# Patient Record
Sex: FEMALE | Race: WHITE | NOT HISPANIC OR LATINO | Employment: OTHER | URBAN - METROPOLITAN AREA
[De-identification: names, ages, dates, MRNs, and addresses within clinical notes are randomized per-mention and may not be internally consistent; named-entity substitution may affect disease eponyms.]

---

## 2018-05-07 ENCOUNTER — CONVERSION ENCOUNTER (OUTPATIENT)
Dept: FAMILY MEDICINE CLINIC | Facility: CLINIC | Age: 83
End: 2018-05-07

## 2018-05-07 ENCOUNTER — OFFICE VISIT CONVERTED (OUTPATIENT)
Dept: FAMILY MEDICINE CLINIC | Facility: CLINIC | Age: 83
End: 2018-05-07
Attending: NURSE PRACTITIONER

## 2018-07-30 ENCOUNTER — OFFICE VISIT CONVERTED (OUTPATIENT)
Dept: FAMILY MEDICINE CLINIC | Facility: CLINIC | Age: 83
End: 2018-07-30
Attending: NURSE PRACTITIONER

## 2018-11-05 ENCOUNTER — OFFICE VISIT CONVERTED (OUTPATIENT)
Dept: FAMILY MEDICINE CLINIC | Facility: CLINIC | Age: 83
End: 2018-11-05
Attending: NURSE PRACTITIONER

## 2019-01-29 ENCOUNTER — HOSPITAL ENCOUNTER (OUTPATIENT)
Dept: FAMILY MEDICINE CLINIC | Facility: CLINIC | Age: 84
Discharge: HOME | End: 2019-01-29
Attending: NURSE PRACTITIONER

## 2019-01-29 LAB
ALBUMIN SERPL-MCNC: 4.2 G/DL (ref 3.5–5)
ALBUMIN/GLOB SERPL: 1.4 {RATIO} (ref 1.4–2.6)
ALP SERPL-CCNC: 64 U/L (ref 43–160)
ALT SERPL-CCNC: 12 U/L (ref 10–40)
ANION GAP SERPL CALC-SCNC: 16 MMOL/L (ref 8–19)
AST SERPL-CCNC: 17 U/L (ref 15–50)
BASOPHILS # BLD AUTO: 0.07 10*3/UL (ref 0–0.2)
BASOPHILS NFR BLD AUTO: 1.21 % (ref 0–3)
BILIRUB SERPL-MCNC: 0.59 MG/DL (ref 0.2–1.3)
BUN SERPL-MCNC: 20 MG/DL (ref 5–25)
BUN/CREAT SERPL: 29 {RATIO} (ref 6–20)
CALCIUM SERPL-MCNC: 9.3 MG/DL (ref 8.7–10.4)
CHLORIDE SERPL-SCNC: 102 MMOL/L (ref 99–111)
CHOLEST SERPL-MCNC: 202 MG/DL (ref 107–200)
CHOLEST/HDLC SERPL: 4 {RATIO} (ref 3–6)
CONV CO2: 29 MMOL/L (ref 22–32)
CONV TOTAL PROTEIN: 7.3 G/DL (ref 6.3–8.2)
CREAT UR-MCNC: 0.69 MG/DL (ref 0.5–0.9)
EOSINOPHIL # BLD AUTO: 0.22 10*3/UL (ref 0–0.7)
EOSINOPHIL # BLD AUTO: 3.73 % (ref 0–7)
ERYTHROCYTE [DISTWIDTH] IN BLOOD BY AUTOMATED COUNT: 12.1 % (ref 11.5–14.5)
EST. AVERAGE GLUCOSE BLD GHB EST-MCNC: 120 MG/DL
GFR SERPLBLD BASED ON 1.73 SQ M-ARVRAT: >60 ML/MIN/{1.73_M2}
GLOBULIN UR ELPH-MCNC: 3.1 G/DL (ref 2–3.5)
GLUCOSE SERPL-MCNC: 100 MG/DL (ref 65–99)
HBA1C MFR BLD: 13.9 G/DL (ref 12–16)
HBA1C MFR BLD: 5.8 % (ref 3.5–5.7)
HCT VFR BLD AUTO: 42.6 % (ref 37–47)
HDLC SERPL-MCNC: 50 MG/DL (ref 40–60)
LDLC SERPL CALC-MCNC: 129 MG/DL (ref 70–100)
LYMPHOCYTES # BLD AUTO: 1.64 10*3/UL (ref 1–5)
MCH RBC QN AUTO: 31.9 PG (ref 27–31)
MCHC RBC AUTO-ENTMCNC: 32.6 G/DL (ref 33–37)
MCV RBC AUTO: 98 FL (ref 81–99)
MONOCYTES # BLD AUTO: 0.48 10*3/UL (ref 0.2–1.2)
MONOCYTES NFR BLD AUTO: 8.23 % (ref 3–10)
NEUTROPHILS # BLD AUTO: 3.37 10*3/UL (ref 2–8)
NEUTROPHILS NFR BLD AUTO: 58.4 % (ref 30–85)
NRBC BLD AUTO-RTO: 0 % (ref 0–0.01)
OSMOLALITY SERPL CALC.SUM OF ELEC: 297 MOSM/KG (ref 273–304)
PLATELET # BLD AUTO: 179 10*3/UL (ref 130–400)
PMV BLD AUTO: 9.1 FL (ref 7.4–10.4)
POTASSIUM SERPL-SCNC: 5.1 MMOL/L (ref 3.5–5.3)
RBC # BLD AUTO: 4.35 10*6/UL (ref 4.2–5.4)
SODIUM SERPL-SCNC: 142 MMOL/L (ref 135–147)
T4 FREE SERPL-MCNC: 1.3 NG/DL (ref 0.9–1.8)
TRIGL SERPL-MCNC: 114 MG/DL (ref 40–150)
TSH SERPL-ACNC: 2.86 M[IU]/L (ref 0.27–4.2)
VARIANT LYMPHS NFR BLD MANUAL: 28.4 % (ref 20–45)
VLDLC SERPL-MCNC: 23 MG/DL (ref 5–37)
WBC # BLD AUTO: 5.77 10*3/UL (ref 4.8–10.8)

## 2019-02-04 ENCOUNTER — OFFICE VISIT CONVERTED (OUTPATIENT)
Dept: FAMILY MEDICINE CLINIC | Facility: CLINIC | Age: 84
End: 2019-02-04
Attending: NURSE PRACTITIONER

## 2019-02-04 ENCOUNTER — CONVERSION ENCOUNTER (OUTPATIENT)
Dept: FAMILY MEDICINE CLINIC | Facility: CLINIC | Age: 84
End: 2019-02-04

## 2019-04-24 ENCOUNTER — HOSPITAL ENCOUNTER (OUTPATIENT)
Dept: FAMILY MEDICINE CLINIC | Facility: CLINIC | Age: 84
Discharge: HOME | End: 2019-04-24
Attending: NURSE PRACTITIONER

## 2019-04-24 LAB
ALBUMIN SERPL-MCNC: 4.3 G/DL (ref 3.5–5)
ALBUMIN/GLOB SERPL: 1.7 {RATIO} (ref 1.4–2.6)
ALP SERPL-CCNC: 66 U/L (ref 43–160)
ALT SERPL-CCNC: 13 U/L (ref 10–40)
ANION GAP SERPL CALC-SCNC: 17 MMOL/L (ref 8–19)
AST SERPL-CCNC: 18 U/L (ref 15–50)
BASOPHILS # BLD AUTO: 0.03 10*3/UL (ref 0–0.2)
BASOPHILS NFR BLD AUTO: 0.5 % (ref 0–3)
BILIRUB SERPL-MCNC: 0.29 MG/DL (ref 0.2–1.3)
BUN SERPL-MCNC: 21 MG/DL (ref 5–25)
BUN/CREAT SERPL: 32 {RATIO} (ref 6–20)
CALCIUM SERPL-MCNC: 9.9 MG/DL (ref 8.7–10.4)
CHLORIDE SERPL-SCNC: 103 MMOL/L (ref 99–111)
CHOLEST SERPL-MCNC: 165 MG/DL (ref 107–200)
CHOLEST/HDLC SERPL: 3.5 {RATIO} (ref 3–6)
CONV ABS IMM GRAN: 0.02 10*3/UL (ref 0–0.2)
CONV CO2: 30 MMOL/L (ref 22–32)
CONV IMMATURE GRAN: 0.3 % (ref 0–1.8)
CONV TOTAL PROTEIN: 6.9 G/DL (ref 6.3–8.2)
CREAT UR-MCNC: 0.65 MG/DL (ref 0.5–0.9)
DEPRECATED RDW RBC AUTO: 51.5 FL (ref 36.4–46.3)
EOSINOPHIL # BLD AUTO: 0.15 10*3/UL (ref 0–0.7)
EOSINOPHIL # BLD AUTO: 2.6 % (ref 0–7)
ERYTHROCYTE [DISTWIDTH] IN BLOOD BY AUTOMATED COUNT: 14.3 % (ref 11.7–14.4)
EST. AVERAGE GLUCOSE BLD GHB EST-MCNC: 103 MG/DL
GFR SERPLBLD BASED ON 1.73 SQ M-ARVRAT: >60 ML/MIN/{1.73_M2}
GLOBULIN UR ELPH-MCNC: 2.6 G/DL (ref 2–3.5)
GLUCOSE SERPL-MCNC: 87 MG/DL (ref 65–99)
HBA1C MFR BLD: 10.9 G/DL (ref 12–16)
HBA1C MFR BLD: 5.2 % (ref 3.5–5.7)
HCT VFR BLD AUTO: 37.8 % (ref 37–47)
HDLC SERPL-MCNC: 47 MG/DL (ref 40–60)
LDLC SERPL CALC-MCNC: 100 MG/DL (ref 70–100)
LYMPHOCYTES # BLD AUTO: 1.57 10*3/UL (ref 1–5)
MCH RBC QN AUTO: 28.1 PG (ref 27–31)
MCHC RBC AUTO-ENTMCNC: 28.8 G/DL (ref 33–37)
MCV RBC AUTO: 97.4 FL (ref 81–99)
MONOCYTES # BLD AUTO: 0.49 10*3/UL (ref 0.2–1.2)
MONOCYTES NFR BLD AUTO: 8.5 % (ref 3–10)
NEUTROPHILS # BLD AUTO: 3.52 10*3/UL (ref 2–8)
NEUTROPHILS NFR BLD AUTO: 60.9 % (ref 30–85)
NRBC CBCN: 0 % (ref 0–0.7)
OSMOLALITY SERPL CALC.SUM OF ELEC: 304 MOSM/KG (ref 273–304)
PLATELET # BLD AUTO: 229 10*3/UL (ref 130–400)
PMV BLD AUTO: 11.4 FL (ref 9.4–12.3)
POTASSIUM SERPL-SCNC: 3.7 MMOL/L (ref 3.5–5.3)
RBC # BLD AUTO: 3.88 10*6/UL (ref 4.2–5.4)
SODIUM SERPL-SCNC: 146 MMOL/L (ref 135–147)
T4 FREE SERPL-MCNC: 1.3 NG/DL (ref 0.9–1.8)
TRIGL SERPL-MCNC: 92 MG/DL (ref 40–150)
TSH SERPL-ACNC: 2.82 M[IU]/L (ref 0.27–4.2)
VARIANT LYMPHS NFR BLD MANUAL: 27.2 % (ref 20–45)
VLDLC SERPL-MCNC: 18 MG/DL (ref 5–37)
WBC # BLD AUTO: 5.78 10*3/UL (ref 4.8–10.8)

## 2019-05-02 ENCOUNTER — OFFICE VISIT CONVERTED (OUTPATIENT)
Dept: FAMILY MEDICINE CLINIC | Facility: CLINIC | Age: 84
End: 2019-05-02
Attending: NURSE PRACTITIONER

## 2019-05-02 ENCOUNTER — HOSPITAL ENCOUNTER (OUTPATIENT)
Dept: FAMILY MEDICINE CLINIC | Facility: CLINIC | Age: 84
Discharge: HOME | End: 2019-05-02
Attending: NURSE PRACTITIONER

## 2019-05-02 LAB
BASOPHILS # BLD AUTO: 0.03 10*3/UL (ref 0–0.2)
BASOPHILS NFR BLD AUTO: 0.5 % (ref 0–3)
BNP SERPL-MCNC: 584 PG/ML (ref 0–1800)
CONV ABS IMM GRAN: 0.01 10*3/UL (ref 0–0.2)
CONV IMMATURE GRAN: 0.2 % (ref 0–1.8)
DEPRECATED RDW RBC AUTO: 50.9 FL (ref 36.4–46.3)
EOSINOPHIL # BLD AUTO: 0.19 10*3/UL (ref 0–0.7)
EOSINOPHIL # BLD AUTO: 3.1 % (ref 0–7)
ERYTHROCYTE [DISTWIDTH] IN BLOOD BY AUTOMATED COUNT: 14.5 % (ref 11.7–14.4)
HBA1C MFR BLD: 10.9 G/DL (ref 12–16)
HCT VFR BLD AUTO: 37.5 % (ref 37–47)
IRON SATN MFR SERPL: 8 % (ref 20–55)
IRON SERPL-MCNC: 39 UG/DL (ref 60–170)
LYMPHOCYTES # BLD AUTO: 1.63 10*3/UL (ref 1–5)
MCH RBC QN AUTO: 27.8 PG (ref 27–31)
MCHC RBC AUTO-ENTMCNC: 29.1 G/DL (ref 33–37)
MCV RBC AUTO: 95.7 FL (ref 81–99)
MONOCYTES # BLD AUTO: 0.51 10*3/UL (ref 0.2–1.2)
MONOCYTES NFR BLD AUTO: 8.4 % (ref 3–10)
NEUTROPHILS # BLD AUTO: 3.73 10*3/UL (ref 2–8)
NEUTROPHILS NFR BLD AUTO: 61.1 % (ref 30–85)
NRBC CBCN: 0 % (ref 0–0.7)
PLATELET # BLD AUTO: 248 10*3/UL (ref 130–400)
PMV BLD AUTO: 11.7 FL (ref 9.4–12.3)
RBC # BLD AUTO: 3.92 10*6/UL (ref 4.2–5.4)
TIBC SERPL-MCNC: 465 UG/DL (ref 245–450)
TRANSFERRIN SERPL-MCNC: 325 MG/DL (ref 250–380)
VARIANT LYMPHS NFR BLD MANUAL: 26.7 % (ref 20–45)
WBC # BLD AUTO: 6.1 10*3/UL (ref 4.8–10.8)

## 2019-08-19 ENCOUNTER — HOSPITAL ENCOUNTER (OUTPATIENT)
Dept: FAMILY MEDICINE CLINIC | Facility: CLINIC | Age: 84
Discharge: HOME | End: 2019-08-19
Attending: NURSE PRACTITIONER

## 2019-08-19 LAB
ALBUMIN SERPL-MCNC: 4.5 G/DL (ref 3.5–5)
ALBUMIN/GLOB SERPL: 1.7 {RATIO} (ref 1.4–2.6)
ALP SERPL-CCNC: 59 U/L (ref 43–160)
ALT SERPL-CCNC: 11 U/L (ref 10–40)
ANION GAP SERPL CALC-SCNC: 20 MMOL/L (ref 8–19)
AST SERPL-CCNC: 20 U/L (ref 15–50)
BASOPHILS # BLD AUTO: 0.03 10*3/UL (ref 0–0.2)
BASOPHILS NFR BLD AUTO: 0.7 % (ref 0–3)
BILIRUB SERPL-MCNC: 0.39 MG/DL (ref 0.2–1.3)
BUN SERPL-MCNC: 14 MG/DL (ref 5–25)
BUN/CREAT SERPL: 22 {RATIO} (ref 6–20)
CALCIUM SERPL-MCNC: 9.1 MG/DL (ref 8.7–10.4)
CHLORIDE SERPL-SCNC: 104 MMOL/L (ref 99–111)
CHOLEST SERPL-MCNC: 168 MG/DL (ref 107–200)
CHOLEST/HDLC SERPL: 3.6 {RATIO} (ref 3–6)
CONV ABS IMM GRAN: 0.01 10*3/UL (ref 0–0.2)
CONV CO2: 24 MMOL/L (ref 22–32)
CONV IMMATURE GRAN: 0.2 % (ref 0–1.8)
CONV TOTAL PROTEIN: 7.1 G/DL (ref 6.3–8.2)
CREAT UR-MCNC: 0.64 MG/DL (ref 0.5–0.9)
DEPRECATED RDW RBC AUTO: 64.4 FL (ref 36.4–46.3)
EOSINOPHIL # BLD AUTO: 0.15 10*3/UL (ref 0–0.7)
EOSINOPHIL # BLD AUTO: 3.4 % (ref 0–7)
ERYTHROCYTE [DISTWIDTH] IN BLOOD BY AUTOMATED COUNT: 19.2 % (ref 11.7–14.4)
GFR SERPLBLD BASED ON 1.73 SQ M-ARVRAT: >60 ML/MIN/{1.73_M2}
GLOBULIN UR ELPH-MCNC: 2.6 G/DL (ref 2–3.5)
GLUCOSE SERPL-MCNC: 107 MG/DL (ref 65–99)
HCT VFR BLD AUTO: 38.5 % (ref 37–47)
HDLC SERPL-MCNC: 47 MG/DL (ref 40–60)
HGB BLD-MCNC: 11.3 G/DL (ref 12–16)
LDLC SERPL CALC-MCNC: 99 MG/DL (ref 70–100)
LYMPHOCYTES # BLD AUTO: 1.27 10*3/UL (ref 1–5)
LYMPHOCYTES NFR BLD AUTO: 28.5 % (ref 20–45)
MCH RBC QN AUTO: 26.7 PG (ref 27–31)
MCHC RBC AUTO-ENTMCNC: 29.4 G/DL (ref 33–37)
MCV RBC AUTO: 90.8 FL (ref 81–99)
MONOCYTES # BLD AUTO: 0.41 10*3/UL (ref 0.2–1.2)
MONOCYTES NFR BLD AUTO: 9.2 % (ref 3–10)
NEUTROPHILS # BLD AUTO: 2.58 10*3/UL (ref 2–8)
NEUTROPHILS NFR BLD AUTO: 58 % (ref 30–85)
NRBC CBCN: 0 % (ref 0–0.7)
OSMOLALITY SERPL CALC.SUM OF ELEC: 299 MOSM/KG (ref 273–304)
PLATELET # BLD AUTO: 197 10*3/UL (ref 130–400)
PMV BLD AUTO: 11.5 FL (ref 9.4–12.3)
POTASSIUM SERPL-SCNC: 4.1 MMOL/L (ref 3.5–5.3)
RBC # BLD AUTO: 4.24 10*6/UL (ref 4.2–5.4)
SODIUM SERPL-SCNC: 144 MMOL/L (ref 135–147)
T4 FREE SERPL-MCNC: 1.3 NG/DL (ref 0.9–1.8)
TRIGL SERPL-MCNC: 111 MG/DL (ref 40–150)
TSH SERPL-ACNC: 2.19 M[IU]/L (ref 0.27–4.2)
VLDLC SERPL-MCNC: 22 MG/DL (ref 5–37)
WBC # BLD AUTO: 4.45 10*3/UL (ref 4.8–10.8)

## 2019-08-21 ENCOUNTER — OFFICE VISIT CONVERTED (OUTPATIENT)
Dept: FAMILY MEDICINE CLINIC | Facility: CLINIC | Age: 84
End: 2019-08-21
Attending: NURSE PRACTITIONER

## 2019-08-21 ENCOUNTER — CONVERSION ENCOUNTER (OUTPATIENT)
Dept: FAMILY MEDICINE CLINIC | Facility: CLINIC | Age: 84
End: 2019-08-21

## 2019-11-12 ENCOUNTER — HOSPITAL ENCOUNTER (OUTPATIENT)
Dept: FAMILY MEDICINE CLINIC | Facility: CLINIC | Age: 84
Discharge: HOME | End: 2019-11-12
Attending: NURSE PRACTITIONER

## 2019-11-12 LAB
ALBUMIN SERPL-MCNC: 4.2 G/DL (ref 3.5–5)
ALBUMIN/GLOB SERPL: 1.4 {RATIO} (ref 1.4–2.6)
ALP SERPL-CCNC: 74 U/L (ref 43–160)
ALT SERPL-CCNC: 16 U/L (ref 10–40)
ANION GAP SERPL CALC-SCNC: 15 MMOL/L (ref 8–19)
AST SERPL-CCNC: 21 U/L (ref 15–50)
BASOPHILS # BLD AUTO: 0.03 10*3/UL (ref 0–0.2)
BASOPHILS NFR BLD AUTO: 0.6 % (ref 0–3)
BILIRUB SERPL-MCNC: 0.43 MG/DL (ref 0.2–1.3)
BUN SERPL-MCNC: 12 MG/DL (ref 5–25)
BUN/CREAT SERPL: 18 {RATIO} (ref 6–20)
CALCIUM SERPL-MCNC: 9.4 MG/DL (ref 8.7–10.4)
CHLORIDE SERPL-SCNC: 102 MMOL/L (ref 99–111)
CHOLEST SERPL-MCNC: 160 MG/DL (ref 107–200)
CHOLEST/HDLC SERPL: 3 {RATIO} (ref 3–6)
CONV ABS IMM GRAN: 0.02 10*3/UL (ref 0–0.2)
CONV CO2: 29 MMOL/L (ref 22–32)
CONV IMMATURE GRAN: 0.4 % (ref 0–1.8)
CONV TOTAL PROTEIN: 7.1 G/DL (ref 6.3–8.2)
CREAT UR-MCNC: 0.65 MG/DL (ref 0.5–0.9)
DEPRECATED RDW RBC AUTO: 53.4 FL (ref 36.4–46.3)
EOSINOPHIL # BLD AUTO: 0.15 10*3/UL (ref 0–0.7)
EOSINOPHIL # BLD AUTO: 2.9 % (ref 0–7)
ERYTHROCYTE [DISTWIDTH] IN BLOOD BY AUTOMATED COUNT: 14.9 % (ref 11.7–14.4)
GFR SERPLBLD BASED ON 1.73 SQ M-ARVRAT: >60 ML/MIN/{1.73_M2}
GLOBULIN UR ELPH-MCNC: 2.9 G/DL (ref 2–3.5)
GLUCOSE SERPL-MCNC: 139 MG/DL (ref 65–99)
HCT VFR BLD AUTO: 39.1 % (ref 37–47)
HDLC SERPL-MCNC: 53 MG/DL (ref 40–60)
HGB BLD-MCNC: 11.6 G/DL (ref 12–16)
LDLC SERPL CALC-MCNC: 93 MG/DL (ref 70–100)
LYMPHOCYTES # BLD AUTO: 1.39 10*3/UL (ref 1–5)
LYMPHOCYTES NFR BLD AUTO: 26.6 % (ref 20–45)
MCH RBC QN AUTO: 28.8 PG (ref 27–31)
MCHC RBC AUTO-ENTMCNC: 29.7 G/DL (ref 33–37)
MCV RBC AUTO: 97 FL (ref 81–99)
MONOCYTES # BLD AUTO: 0.38 10*3/UL (ref 0.2–1.2)
MONOCYTES NFR BLD AUTO: 7.3 % (ref 3–10)
NEUTROPHILS # BLD AUTO: 3.25 10*3/UL (ref 2–8)
NEUTROPHILS NFR BLD AUTO: 62.2 % (ref 30–85)
NRBC CBCN: 0 % (ref 0–0.7)
OSMOLALITY SERPL CALC.SUM OF ELEC: 296 MOSM/KG (ref 273–304)
PLATELET # BLD AUTO: 193 10*3/UL (ref 130–400)
PMV BLD AUTO: 11.4 FL (ref 9.4–12.3)
POTASSIUM SERPL-SCNC: 4.3 MMOL/L (ref 3.5–5.3)
RBC # BLD AUTO: 4.03 10*6/UL (ref 4.2–5.4)
SODIUM SERPL-SCNC: 142 MMOL/L (ref 135–147)
T4 FREE SERPL-MCNC: 1.2 NG/DL (ref 0.9–1.8)
TRIGL SERPL-MCNC: 72 MG/DL (ref 40–150)
TSH SERPL-ACNC: 3.02 M[IU]/L (ref 0.27–4.2)
VLDLC SERPL-MCNC: 14 MG/DL (ref 5–37)
WBC # BLD AUTO: 5.22 10*3/UL (ref 4.8–10.8)

## 2019-11-14 ENCOUNTER — OFFICE VISIT CONVERTED (OUTPATIENT)
Dept: FAMILY MEDICINE CLINIC | Facility: CLINIC | Age: 84
End: 2019-11-14
Attending: NURSE PRACTITIONER

## 2019-11-14 ENCOUNTER — CONVERSION ENCOUNTER (OUTPATIENT)
Dept: FAMILY MEDICINE CLINIC | Facility: CLINIC | Age: 84
End: 2019-11-14

## 2019-11-21 ENCOUNTER — OFFICE VISIT CONVERTED (OUTPATIENT)
Dept: FAMILY MEDICINE CLINIC | Facility: CLINIC | Age: 84
End: 2019-11-21
Attending: NURSE PRACTITIONER

## 2019-11-21 ENCOUNTER — CONVERSION ENCOUNTER (OUTPATIENT)
Dept: FAMILY MEDICINE CLINIC | Facility: CLINIC | Age: 84
End: 2019-11-21

## 2020-02-04 ENCOUNTER — HOSPITAL ENCOUNTER (OUTPATIENT)
Dept: FAMILY MEDICINE CLINIC | Facility: CLINIC | Age: 85
Discharge: HOME | End: 2020-02-04
Attending: NURSE PRACTITIONER

## 2020-02-04 LAB
ALBUMIN SERPL-MCNC: 4.3 G/DL (ref 3.5–5)
ALBUMIN/GLOB SERPL: 1.5 {RATIO} (ref 1.4–2.6)
ALP SERPL-CCNC: 69 U/L (ref 43–160)
ALT SERPL-CCNC: 14 U/L (ref 10–40)
ANION GAP SERPL CALC-SCNC: 20 MMOL/L (ref 8–19)
AST SERPL-CCNC: 20 U/L (ref 15–50)
BASOPHILS # BLD AUTO: 0.03 10*3/UL (ref 0–0.2)
BASOPHILS NFR BLD AUTO: 0.5 % (ref 0–3)
BILIRUB SERPL-MCNC: 0.4 MG/DL (ref 0.2–1.3)
BUN SERPL-MCNC: 14 MG/DL (ref 5–25)
BUN/CREAT SERPL: 19 {RATIO} (ref 6–20)
CALCIUM SERPL-MCNC: 9.9 MG/DL (ref 8.7–10.4)
CHLORIDE SERPL-SCNC: 103 MMOL/L (ref 99–111)
CHOLEST SERPL-MCNC: 174 MG/DL (ref 107–200)
CHOLEST/HDLC SERPL: 3.4 {RATIO} (ref 3–6)
CONV ABS IMM GRAN: 0.01 10*3/UL (ref 0–0.2)
CONV CO2: 28 MMOL/L (ref 22–32)
CONV IMMATURE GRAN: 0.2 % (ref 0–1.8)
CONV TOTAL PROTEIN: 7.2 G/DL (ref 6.3–8.2)
CREAT UR-MCNC: 0.72 MG/DL (ref 0.5–0.9)
DEPRECATED RDW RBC AUTO: 58.9 FL (ref 36.4–46.3)
EOSINOPHIL # BLD AUTO: 0.19 10*3/UL (ref 0–0.7)
EOSINOPHIL # BLD AUTO: 3.3 % (ref 0–7)
ERYTHROCYTE [DISTWIDTH] IN BLOOD BY AUTOMATED COUNT: 16.1 % (ref 11.7–14.4)
EST. AVERAGE GLUCOSE BLD GHB EST-MCNC: 111 MG/DL
GFR SERPLBLD BASED ON 1.73 SQ M-ARVRAT: >60 ML/MIN/{1.73_M2}
GLOBULIN UR ELPH-MCNC: 2.9 G/DL (ref 2–3.5)
GLUCOSE SERPL-MCNC: 96 MG/DL (ref 65–99)
HBA1C MFR BLD: 5.5 % (ref 3.5–5.7)
HCT VFR BLD AUTO: 42.3 % (ref 37–47)
HDLC SERPL-MCNC: 51 MG/DL (ref 40–60)
HGB BLD-MCNC: 12.7 G/DL (ref 12–16)
IRON SATN MFR SERPL: 19 % (ref 20–55)
IRON SERPL-MCNC: 76 UG/DL (ref 60–170)
LDLC SERPL CALC-MCNC: 111 MG/DL (ref 70–100)
LYMPHOCYTES # BLD AUTO: 1.62 10*3/UL (ref 1–5)
LYMPHOCYTES NFR BLD AUTO: 28.4 % (ref 20–45)
MCH RBC QN AUTO: 29.8 PG (ref 27–31)
MCHC RBC AUTO-ENTMCNC: 30 G/DL (ref 33–37)
MCV RBC AUTO: 99.3 FL (ref 81–99)
MONOCYTES # BLD AUTO: 0.52 10*3/UL (ref 0.2–1.2)
MONOCYTES NFR BLD AUTO: 9.1 % (ref 3–10)
NEUTROPHILS # BLD AUTO: 3.33 10*3/UL (ref 2–8)
NEUTROPHILS NFR BLD AUTO: 58.5 % (ref 30–85)
NRBC CBCN: 0 % (ref 0–0.7)
OSMOLALITY SERPL CALC.SUM OF ELEC: 304 MOSM/KG (ref 273–304)
PLATELET # BLD AUTO: 185 10*3/UL (ref 130–400)
PMV BLD AUTO: 11.8 FL (ref 9.4–12.3)
POTASSIUM SERPL-SCNC: 4 MMOL/L (ref 3.5–5.3)
RBC # BLD AUTO: 4.26 10*6/UL (ref 4.2–5.4)
SODIUM SERPL-SCNC: 147 MMOL/L (ref 135–147)
T4 FREE SERPL-MCNC: 1.1 NG/DL (ref 0.9–1.8)
TIBC SERPL-MCNC: 410 UG/DL (ref 245–450)
TRANSFERRIN SERPL-MCNC: 287 MG/DL (ref 250–380)
TRIGL SERPL-MCNC: 61 MG/DL (ref 40–150)
TSH SERPL-ACNC: 4.17 M[IU]/L (ref 0.27–4.2)
VLDLC SERPL-MCNC: 12 MG/DL (ref 5–37)
WBC # BLD AUTO: 5.7 10*3/UL (ref 4.8–10.8)

## 2020-02-05 ENCOUNTER — OFFICE VISIT CONVERTED (OUTPATIENT)
Dept: FAMILY MEDICINE CLINIC | Facility: CLINIC | Age: 85
End: 2020-02-05
Attending: NURSE PRACTITIONER

## 2020-04-30 ENCOUNTER — TELEPHONE CONVERTED (OUTPATIENT)
Dept: FAMILY MEDICINE CLINIC | Facility: CLINIC | Age: 85
End: 2020-04-30
Attending: NURSE PRACTITIONER

## 2020-07-27 ENCOUNTER — HOSPITAL ENCOUNTER (OUTPATIENT)
Dept: FAMILY MEDICINE CLINIC | Facility: CLINIC | Age: 85
Discharge: HOME | End: 2020-07-27
Attending: NURSE PRACTITIONER

## 2020-07-27 LAB
ALBUMIN SERPL-MCNC: 4 G/DL (ref 3.5–5)
ALBUMIN/GLOB SERPL: 1.3 {RATIO} (ref 1.4–2.6)
ALP SERPL-CCNC: 70 U/L (ref 38–185)
ALT SERPL-CCNC: 19 U/L (ref 10–40)
ANION GAP SERPL CALC-SCNC: 21 MMOL/L (ref 8–19)
AST SERPL-CCNC: 24 U/L (ref 15–50)
BASOPHILS # BLD AUTO: 0.02 10*3/UL (ref 0–0.2)
BASOPHILS NFR BLD AUTO: 0.4 % (ref 0–3)
BILIRUB SERPL-MCNC: 0.25 MG/DL (ref 0.2–1.3)
BUN SERPL-MCNC: 16 MG/DL (ref 5–25)
BUN/CREAT SERPL: 21 {RATIO} (ref 6–20)
CALCIUM SERPL-MCNC: 10 MG/DL (ref 8.7–10.4)
CHLORIDE SERPL-SCNC: 108 MMOL/L (ref 99–111)
CHOLEST SERPL-MCNC: 155 MG/DL (ref 107–200)
CHOLEST/HDLC SERPL: 4 {RATIO} (ref 3–6)
CONV ABS IMM GRAN: 0.01 10*3/UL (ref 0–0.2)
CONV CO2: 23 MMOL/L (ref 22–32)
CONV IMMATURE GRAN: 0.2 % (ref 0–1.8)
CONV TOTAL PROTEIN: 7.1 G/DL (ref 6.3–8.2)
CREAT UR-MCNC: 0.76 MG/DL (ref 0.5–0.9)
DEPRECATED RDW RBC AUTO: 50.4 FL (ref 36.4–46.3)
EOSINOPHIL # BLD AUTO: 0.21 10*3/UL (ref 0–0.7)
EOSINOPHIL # BLD AUTO: 4.6 % (ref 0–7)
ERYTHROCYTE [DISTWIDTH] IN BLOOD BY AUTOMATED COUNT: 13.2 % (ref 11.7–14.4)
GFR SERPLBLD BASED ON 1.73 SQ M-ARVRAT: >60 ML/MIN/{1.73_M2}
GLOBULIN UR ELPH-MCNC: 3.1 G/DL (ref 2–3.5)
GLUCOSE SERPL-MCNC: 104 MG/DL (ref 65–99)
HCT VFR BLD AUTO: 40.1 % (ref 37–47)
HDLC SERPL-MCNC: 39 MG/DL (ref 40–60)
HGB BLD-MCNC: 12.2 G/DL (ref 12–16)
IRON SATN MFR SERPL: 19 % (ref 20–55)
IRON SERPL-MCNC: 63 UG/DL (ref 60–170)
LDLC SERPL CALC-MCNC: 95 MG/DL (ref 70–100)
LYMPHOCYTES # BLD AUTO: 1.67 10*3/UL (ref 1–5)
LYMPHOCYTES NFR BLD AUTO: 36.5 % (ref 20–45)
MCH RBC QN AUTO: 31 PG (ref 27–31)
MCHC RBC AUTO-ENTMCNC: 30.4 G/DL (ref 33–37)
MCV RBC AUTO: 102 FL (ref 81–99)
MONOCYTES # BLD AUTO: 0.51 10*3/UL (ref 0.2–1.2)
MONOCYTES NFR BLD AUTO: 11.2 % (ref 3–10)
NEUTROPHILS # BLD AUTO: 2.15 10*3/UL (ref 2–8)
NEUTROPHILS NFR BLD AUTO: 47.1 % (ref 30–85)
NRBC CBCN: 0 % (ref 0–0.7)
OSMOLALITY SERPL CALC.SUM OF ELEC: 305 MOSM/KG (ref 273–304)
PLATELET # BLD AUTO: 180 10*3/UL (ref 130–400)
PMV BLD AUTO: 11.4 FL (ref 9.4–12.3)
POTASSIUM SERPL-SCNC: 4.8 MMOL/L (ref 3.5–5.3)
RBC # BLD AUTO: 3.93 10*6/UL (ref 4.2–5.4)
SODIUM SERPL-SCNC: 147 MMOL/L (ref 135–147)
TIBC SERPL-MCNC: 327 UG/DL (ref 245–450)
TRANSFERRIN SERPL-MCNC: 229 MG/DL (ref 250–380)
TRIGL SERPL-MCNC: 105 MG/DL (ref 40–150)
TSH SERPL-ACNC: 0.02 M[IU]/L (ref 0.27–4.2)
VLDLC SERPL-MCNC: 21 MG/DL (ref 5–37)
WBC # BLD AUTO: 4.57 10*3/UL (ref 4.8–10.8)

## 2020-07-30 ENCOUNTER — OFFICE VISIT CONVERTED (OUTPATIENT)
Dept: FAMILY MEDICINE CLINIC | Facility: CLINIC | Age: 85
End: 2020-07-30
Attending: NURSE PRACTITIONER

## 2020-10-14 ENCOUNTER — HOSPITAL ENCOUNTER (OUTPATIENT)
Dept: LAB | Facility: HOSPITAL | Age: 85
Discharge: HOME | End: 2020-10-14
Attending: NURSE PRACTITIONER

## 2020-10-14 LAB
ALBUMIN SERPL-MCNC: 4.4 G/DL (ref 3.5–5)
ALBUMIN/GLOB SERPL: 1.4 {RATIO} (ref 1.4–2.6)
ALP SERPL-CCNC: 75 U/L (ref 38–185)
ALT SERPL-CCNC: 11 U/L (ref 10–40)
ANION GAP SERPL CALC-SCNC: 17 MMOL/L (ref 8–19)
AST SERPL-CCNC: 19 U/L (ref 15–50)
BASOPHILS # BLD AUTO: 0.02 10*3/UL (ref 0–0.2)
BASOPHILS NFR BLD AUTO: 0.3 % (ref 0–3)
BILIRUB SERPL-MCNC: 0.48 MG/DL (ref 0.2–1.3)
BUN SERPL-MCNC: 24 MG/DL (ref 5–25)
BUN/CREAT SERPL: 22 {RATIO} (ref 6–20)
CALCIUM SERPL-MCNC: 10.3 MG/DL (ref 8.7–10.4)
CHLORIDE SERPL-SCNC: 101 MMOL/L (ref 99–111)
CHOLEST SERPL-MCNC: 182 MG/DL (ref 107–200)
CHOLEST/HDLC SERPL: 4.3 {RATIO} (ref 3–6)
CONV ABS IMM GRAN: 0.02 10*3/UL (ref 0–0.2)
CONV CO2: 27 MMOL/L (ref 22–32)
CONV IMMATURE GRAN: 0.3 % (ref 0–1.8)
CONV TOTAL PROTEIN: 7.6 G/DL (ref 6.3–8.2)
CREAT UR-MCNC: 1.07 MG/DL (ref 0.5–0.9)
DEPRECATED RDW RBC AUTO: 49.5 FL (ref 36.4–46.3)
EOSINOPHIL # BLD AUTO: 0.16 10*3/UL (ref 0–0.7)
EOSINOPHIL # BLD AUTO: 2.6 % (ref 0–7)
ERYTHROCYTE [DISTWIDTH] IN BLOOD BY AUTOMATED COUNT: 13.7 % (ref 11.7–14.4)
FERRITIN SERPL-MCNC: 45 NG/ML (ref 10–200)
FOLATE SERPL-MCNC: 17.8 NG/ML (ref 4.8–20)
GFR SERPLBLD BASED ON 1.73 SQ M-ARVRAT: 45 ML/MIN/{1.73_M2}
GLOBULIN UR ELPH-MCNC: 3.2 G/DL (ref 2–3.5)
GLUCOSE SERPL-MCNC: 95 MG/DL (ref 65–99)
HCT VFR BLD AUTO: 40 % (ref 37–47)
HDLC SERPL-MCNC: 42 MG/DL (ref 40–60)
HGB BLD-MCNC: 12.5 G/DL (ref 12–16)
IRON SATN MFR SERPL: 24 % (ref 20–55)
IRON SERPL-MCNC: 94 UG/DL (ref 60–170)
LDLC SERPL CALC-MCNC: 113 MG/DL (ref 70–100)
LYMPHOCYTES # BLD AUTO: 2.05 10*3/UL (ref 1–5)
LYMPHOCYTES NFR BLD AUTO: 33 % (ref 20–45)
MCH RBC QN AUTO: 30.6 PG (ref 27–31)
MCHC RBC AUTO-ENTMCNC: 31.3 G/DL (ref 33–37)
MCV RBC AUTO: 97.8 FL (ref 81–99)
MONOCYTES # BLD AUTO: 0.5 10*3/UL (ref 0.2–1.2)
MONOCYTES NFR BLD AUTO: 8 % (ref 3–10)
NEUTROPHILS # BLD AUTO: 3.47 10*3/UL (ref 2–8)
NEUTROPHILS NFR BLD AUTO: 55.8 % (ref 30–85)
NRBC CBCN: 0 % (ref 0–0.7)
OSMOLALITY SERPL CALC.SUM OF ELEC: 296 MOSM/KG (ref 273–304)
PLATELET # BLD AUTO: 195 10*3/UL (ref 130–400)
PMV BLD AUTO: 11.1 FL (ref 9.4–12.3)
POTASSIUM SERPL-SCNC: 3.7 MMOL/L (ref 3.5–5.3)
RBC # BLD AUTO: 4.09 10*6/UL (ref 4.2–5.4)
SODIUM SERPL-SCNC: 141 MMOL/L (ref 135–147)
TIBC SERPL-MCNC: 393 UG/DL (ref 245–450)
TRANSFERRIN SERPL-MCNC: 275 MG/DL (ref 250–380)
TRIGL SERPL-MCNC: 134 MG/DL (ref 40–150)
TSH SERPL-ACNC: 0.06 M[IU]/L (ref 0.27–4.2)
VIT B12 SERPL-MCNC: 265 PG/ML (ref 211–911)
VLDLC SERPL-MCNC: 27 MG/DL (ref 5–37)
WBC # BLD AUTO: 6.22 10*3/UL (ref 4.8–10.8)

## 2020-10-26 ENCOUNTER — OFFICE VISIT CONVERTED (OUTPATIENT)
Dept: FAMILY MEDICINE CLINIC | Facility: CLINIC | Age: 85
End: 2020-10-26
Attending: NURSE PRACTITIONER

## 2020-10-26 ENCOUNTER — CONVERSION ENCOUNTER (OUTPATIENT)
Dept: FAMILY MEDICINE CLINIC | Facility: CLINIC | Age: 85
End: 2020-10-26

## 2020-11-11 ENCOUNTER — OFFICE VISIT CONVERTED (OUTPATIENT)
Dept: FAMILY MEDICINE CLINIC | Facility: CLINIC | Age: 85
End: 2020-11-11
Attending: NURSE PRACTITIONER

## 2020-12-10 ENCOUNTER — HOSPITAL ENCOUNTER (OUTPATIENT)
Dept: LAB | Facility: HOSPITAL | Age: 85
Discharge: HOME | End: 2020-12-10
Attending: NURSE PRACTITIONER

## 2020-12-10 LAB
T4 FREE SERPL-MCNC: 1 NG/DL (ref 0.9–1.8)
TSH SERPL-ACNC: 2.59 M[IU]/L (ref 0.27–4.2)

## 2021-01-19 ENCOUNTER — HOSPITAL ENCOUNTER (OUTPATIENT)
Dept: FAMILY MEDICINE CLINIC | Facility: CLINIC | Age: 86
Discharge: HOME | End: 2021-01-19
Attending: NURSE PRACTITIONER

## 2021-01-19 ENCOUNTER — OFFICE VISIT CONVERTED (OUTPATIENT)
Dept: FAMILY MEDICINE CLINIC | Facility: CLINIC | Age: 86
End: 2021-01-19
Attending: NURSE PRACTITIONER

## 2021-01-19 LAB
BASOPHILS # BLD AUTO: 0.05 10*3/UL (ref 0–0.2)
BASOPHILS NFR BLD AUTO: 0.6 % (ref 0–3)
CONV ABS IMM GRAN: 0.01 10*3/UL (ref 0–0.2)
CONV IMMATURE GRAN: 0.1 % (ref 0–1.8)
DEPRECATED RDW RBC AUTO: 50.2 FL (ref 36.4–46.3)
EOSINOPHIL # BLD AUTO: 0.24 10*3/UL (ref 0–0.7)
EOSINOPHIL # BLD AUTO: 3 % (ref 0–7)
ERYTHROCYTE [DISTWIDTH] IN BLOOD BY AUTOMATED COUNT: 13.7 % (ref 11.7–14.4)
HCT VFR BLD AUTO: 42.4 % (ref 37–47)
HGB BLD-MCNC: 13.3 G/DL (ref 12–16)
LYMPHOCYTES # BLD AUTO: 2.8 10*3/UL (ref 1–5)
LYMPHOCYTES NFR BLD AUTO: 35.6 % (ref 20–45)
MCH RBC QN AUTO: 31.5 PG (ref 27–31)
MCHC RBC AUTO-ENTMCNC: 31.4 G/DL (ref 33–37)
MCV RBC AUTO: 100.5 FL (ref 81–99)
MONOCYTES # BLD AUTO: 0.61 10*3/UL (ref 0.2–1.2)
MONOCYTES NFR BLD AUTO: 7.8 % (ref 3–10)
NEUTROPHILS # BLD AUTO: 4.16 10*3/UL (ref 2–8)
NEUTROPHILS NFR BLD AUTO: 52.9 % (ref 30–85)
NRBC CBCN: 0 % (ref 0–0.7)
PLATELET # BLD AUTO: 223 10*3/UL (ref 130–400)
PMV BLD AUTO: 11.3 FL (ref 9.4–12.3)
RBC # BLD AUTO: 4.22 10*6/UL (ref 4.2–5.4)
WBC # BLD AUTO: 7.87 10*3/UL (ref 4.8–10.8)

## 2021-01-20 LAB
ALBUMIN SERPL-MCNC: 4.4 G/DL (ref 3.5–5)
ALBUMIN/GLOB SERPL: 1.4 {RATIO} (ref 1.4–2.6)
ALP SERPL-CCNC: 70 U/L (ref 38–185)
ALT SERPL-CCNC: 20 U/L (ref 10–40)
ANION GAP SERPL CALC-SCNC: 17 MMOL/L (ref 8–19)
AST SERPL-CCNC: 25 U/L (ref 15–50)
BILIRUB SERPL-MCNC: 0.36 MG/DL (ref 0.2–1.3)
BUN SERPL-MCNC: 20 MG/DL (ref 5–25)
BUN/CREAT SERPL: 20 {RATIO} (ref 6–20)
CALCIUM SERPL-MCNC: 10.2 MG/DL (ref 8.7–10.4)
CHLORIDE SERPL-SCNC: 100 MMOL/L (ref 99–111)
CONV CO2: 29 MMOL/L (ref 22–32)
CONV TOTAL PROTEIN: 7.6 G/DL (ref 6.3–8.2)
CREAT UR-MCNC: 1.01 MG/DL (ref 0.5–0.9)
GFR SERPLBLD BASED ON 1.73 SQ M-ARVRAT: 48 ML/MIN/{1.73_M2}
GLOBULIN UR ELPH-MCNC: 3.2 G/DL (ref 2–3.5)
GLUCOSE SERPL-MCNC: 93 MG/DL (ref 65–99)
OSMOLALITY SERPL CALC.SUM OF ELEC: 294 MOSM/KG (ref 273–304)
POTASSIUM SERPL-SCNC: 4.6 MMOL/L (ref 3.5–5.3)
SODIUM SERPL-SCNC: 141 MMOL/L (ref 135–147)
T4 FREE SERPL-MCNC: 1 NG/DL (ref 0.9–1.8)
TSH SERPL-ACNC: 4 M[IU]/L (ref 0.27–4.2)

## 2021-04-13 ENCOUNTER — OFFICE VISIT CONVERTED (OUTPATIENT)
Dept: FAMILY MEDICINE CLINIC | Facility: CLINIC | Age: 86
End: 2021-04-13
Attending: NURSE PRACTITIONER

## 2021-04-13 ENCOUNTER — HOSPITAL ENCOUNTER (OUTPATIENT)
Dept: FAMILY MEDICINE CLINIC | Facility: CLINIC | Age: 86
Discharge: HOME | End: 2021-04-13
Attending: NURSE PRACTITIONER

## 2021-05-12 NOTE — PROGRESS NOTES
"   Quick Note      Patient Name: Loly Terrazas   Patient ID: [id number]   Sex: Female   Birthdate: Luciana 10, 1929    Primary Care Provider: Keisha OG    Visit Date: April 30, 2020    Provider: EARLE Lo   Location: [facility]   Location Address: [address] [place] [address] [place] Bynum  [id number]   Location Phone: [phone]          History Of Present Illness  TELEHEALTH TELEPHONE VISIT  Chief Complaint: 3 month follow up   Loly Terrazas is a 89 year old /White female who is presenting for evaluation via telehealth telephone visit. Verbal consent obtained before beginning visit. Daughter Linnea   Provider spent 9:30-9:47 minutes with the patient during telehealth visit.   The following staff were present during this visit: Ling,BROOKS, pt, KCW(self), daughter in the background   Past Medical History/Overview of Patient Symptoms  Loly Terrazas is a 89 year old /White female who presents for evaluation and treatment of:      Allergies:  She is having runny nose but overall doing ok.  She feels that she has a sinus infection.  She has cats and is allergic to cats and \"it has been over a week\"  No fever but blowing clear drainage.  \"I take my allergy med but it really doesn't help...if I go outside I am better\"  HTN:  She is not checking but \"we have everything like that\".    THYROID:  She is doing good overall.  She is losing her hair.  INSOMNIA:  She is sleeping good per pt.  \"Like a log\"  Memory Changes:  She told me that \"I already had my birthday on [birth year]\".  Gait disturbance...\"I have to hold onto things really good so I don't fall\"  She has not had much dizziness and she takes the med and it helps her \"sideways feeling\"    I reviewed labs from Feb.           Assessment  · Allergic rhinitis due to allergen     477.9/J30.9  · COPD (chronic obstructive pulmonary disease)     496/J44.9  · Essential " hypertension     401.9/I10  · Hypothyroidism     244.9/E03.9  · Insomnia, unspecified     780.52/G47.00  · Memory change     780.93/R41.3  · Gait disturbance     781.2/R26.9  · Dizziness     780.4/R42    Problems Reconciled  Plan  · Orders  o Physician Telephone Evaluation, 11-20 minutes (25998) - - 2020  o ACO-39: Current medications updated and reviewed () - - 2020  o ACO-15: Pneumococcal Vaccine Administered or Previously Received (4040F) - - 2020  o ACO-14: Influenza immunization administered or previously received () - - 2020  · Medications  o prednisone 20 mg oral tablet   SI tab po TID for 3 days1 tab po BID for 3 days1 tab po Qday for 3 days   DISP: (18) tablets with 0 refills  Prescribed on 2020     o atenolol 50 mg oral tablet   SIG: take 1 tablet (50 mg) by oral route once daily for 90 days   DISP: (90) tablet with 1 refills  Refilled on 2020     o cetirizine 10 mg oral tablet   SIG: take 1 tablet (10 mg) by oral route once daily for 90 days   DISP: (90) tablets with 1 refills  Refilled on 2020     o levothyroxine 50 mcg oral tablet   SIG: take 1 tablet (50 mcg) by oral route once daily for 90 days   DISP: (90) tablet with 1 refills  Refilled on 2020     o meclizine 12.5 mg oral tablet   SIG: take 1 tablet by oral route 2 times a day as needed   DISP: (180) tablets with 1 refills  Refilled on 2020     o Medications have been Reconciled  o Transition of Care or Provider Policy  · Instructions  o Plan Of Care:   o Take all medications as prescribed/directed.  o Rest. Increase Fluids.  o Call the office with any concerns or questions.  o We will do the steroids for the allergies. Discussed she doesn't need abx right now but if not better after she does the steroids let me know and we will send abx in. F.U in3 months for labs and appt.   o Electronically Identified Patient Education Materials Provided Electronically  · Disposition  o Call or  Return if symptoms worsen or persist.  o Return Visit Request in/on 3 months +/- 2 days (26229).            Electronically Signed by: EARLE Lo -Author on April 30, 2020 09:48:36 AM

## 2021-05-13 NOTE — PROGRESS NOTES
"   Progress Note      Patient Name: Loly Terrazas   Patient ID: [id number]   Sex: Female   Birthdate: Luciana 10, 1929    Primary Care Provider: Keisha OG    Visit Date: July 30, 2020    Provider: EARLE Khan   Location: [facility]   Location Address: [address]  [place]  [id number]   Location Phone: [phone]          Chief Complaint     3 month follow up       History Of Present Illness  Loly Terrazas is a 89 year old /White female who presents for evaluation and treatment of:      She is here with her daughter who is in the room.  I reviewed her labs.      She says she has a sinus infection, her nose is dripping, she has a headache and pressure.     LIPID:  She does not take chol med and will not take med.      Thyroid:  thyroid is overtreated    HTN:  Uncontrolled and did take her meds.  \"I'm just sitting around waiting to die.\" SHe is agitated.  \"You and me we are not equals.\" \"We are just living in [employer]'s world.\" (which she says is [employer]'s world and no one understands anything.\" Her daughter says this is her baseline.  She denies anxiety/depression.    She has had 3 falls recently. She declines on a consult with [facility]. \"I'm tired of dealing with all of you.\"       Past Medical History  Disease Name Date Onset Notes   Allergic rhinitis due to allergen 11/05/2018 --    Anemia 11/14/2019 --    Arthritis --  --    COPD (chronic obstructive pulmonary disease) 11/05/2018 --    DDD (degenerative disc disease), lumbar 11/14/2019 --    Deafness --  --    Dyspnea 11/14/2019 --    Essential hypertension 11/05/2018 --    Fall 11/14/2019 --    Gait disturbance 11/05/2018 --    Herniated Disc L5-S1 --  --    History of stroke 11/05/2018 --    Hypothyroidism 11/05/2018 --    Insomnia, unspecified 05/02/2019 --    Low hemoglobin 11/14/2019 --    Lumbago 08/21/2019 --    Memory change 11/05/2018 --    Scoliosis --  --    Sinus node dysfunction --  --    Statin " intolerance 2019 --    Tremors --  --    Vein disorder 2018 --          Past Surgical History  Procedure Name Date Notes   Cholecystecomy --  --    Leg Surgery --  right leg fracture with pins   Thyroidectomy --  --          Medication List  Name Date Started Instructions   Adults Multivitamin 18 mg iron-400 mcg-25 mcg oral tablet  take 1 tablet by oral route daily   Aleve 220 mg oral capsule  take 1-2 capsules by oral route daily   atenolol 50 mg oral tablet 2020 take 1 tablet (50 mg) by oral route once daily for 90 days   cetirizine 10 mg oral tablet 2020 take 1 tablet (10 mg) by oral route once daily for 90 days   levothyroxine 50 mcg oral tablet 2020 take 1 tablet (50 mcg) by oral route once daily for 90 days   meclizine 12.5 mg oral tablet 2020 take 1 tablet by oral route 2 times a day as needed   Vitamin C 500 mg oral tablet  take 1 tablet by oral route daily   Vitamin D3 400 unit oral tablet  take 1 tablet by oral route daily         Allergy List  Allergen Name Date Reaction Notes   Pneumovax 2019 --  --        Allergies Reconciled  Reproductive History  Menstrual   Pregnancy Summary   Total Pregnancies: 5 Full Term: 4 Premature: 1   Ab Induced: 0 Ab Spontaneous: 0 Ectopics: 0   Multiples: 0 Livin         Social History  Finding Status Start/Stop Quantity Notes   Alcohol Current some day --/-- --  1-2 beers week   Tobacco Former --/-- --  SMOKED A FEW YEARS         Immunizations  NameDate Admin Mfg Trade Name Lot Number Route Inj VIS Given VIS Publication   Ejtvpikdu08/14/2019 SKB Fluarix, quadrivalent, preservative free [id number]  RD 2019    Comments:    Jttezlxsv01/04/2019 SKB Fluarix, quadrivalent, preservative free [id number]  LD 2019   Comments:    Zspywkqxt3237/14/2019 NE Not Entered [id number]  LD 2019    Comments:    Prevnar  UNK Unknown TradeName [id number]  RD 2019   Comments:          Review of  Systems  · Constitutional  o Admits  o : fatigue  o Denies  o : fever, weight loss  · HENT  o Admits  o : headaches, sinus pain, nasal congestion, nasal discharge, postnasal drip  · Cardiovascular  o Denies  o : chest pain, irregular heart beats  · Respiratory  o Admits  o : cough  o Denies  o : shortness of breath, wheezing  · Gastrointestinal  o Denies  o : nausea, vomiting, diarrhea, constipation  · Psychiatric  o Denies  o : anxiety, depression      Vitals  Date Time BP Position Site L\R Cuff Size HR RR TEMP (F) WT  HT  BMI kg/m2 BSA m2 O2 Sat HC       02/05/2020 10:34 /80 Sitting               07/30/2020 09:46 /63 Sitting    75 - R 16 98.4 150lbs 6oz 5'   29.37 1.7 96 %          Physical Examination  · Constitutional  o Appearance  o : well-nourished, well developed, alert, in no acute distress  · Neck  o Inspection/Palpation  o : normal appearance, no masses or tenderness, trachea midline  · Respiratory  o Respiratory Effort  o : breathing unlabored  o Auscultation of Lungs  o : clear bilaterally  · Cardiovascular  o Heart  o :   § Auscultation of Heart  § : regular rate and rhythm, no murmurs, gallops or rubs  § Palpation of Heart  § : normal apical impulse, no cardiac thrill present  o Peripheral Vascular System  o :   § Carotid Arteries  § : normal pulses bilaterally, no bruits present  § Pedal Pulses  § : pulses 2 bilaterally  § Extremities  § : trace mike lower edema; less than 2 second refill noted  · Neurologic  o Mental Status Examination  o :   § Orientation  § : she is unable to tell me date, time  o Cranial Nerves  o : cranial nerves intact bilaterally, no nystagmus present  · Psychiatric  o Mood and Affect  o : agitated              Assessment  · Allergic rhinitis due to allergen     477.9/J30.9  · Anemia     285.9/D64.9  · COPD (chronic obstructive pulmonary disease)     496/J44.9  · Essential hypertension     401.9/I10  · Hypothyroidism     244.9/E03.9  · Insomnia,  unspecified     780.52/G47.00  · Lumbago     724.2/M54.5  · Screening for depression     V79.0/Z13.89  · Statin intolerance     995.27/Z78.9  · History of stroke     V12.54/Z86.73  · Gait disturbance     781.2/R26.9  · Memory change     780.93/R41.3  · Fall     E888.9/W19.XXXA  · Dyspnea     786.09/R06.00  · DDD (degenerative disc disease), lumbar     722.52/M51.36  · URI (upper respiratory infection)     465.9/J06.9      Plan  · Orders  o Annual depression screening, 15 minutes (03120, ) - V79.0/Z13.89 - 07/30/2020  o ACO-18: Negative screen for clinical depression using a standardized tool () - V79.0/Z13.89 - 07/30/2020  o ACO-42: Not currently on a statin or hasn't received an order for a statin due to documented medical reason () - 995.27/Z78.9 - 07/30/2020  o ACO-39: Current medications updated and reviewed () - - 07/30/2020  o ACO-15: Pneumococcal Vaccine Administered or Previously Received (4040F) - - 07/30/2020  o ACO-14: Influenza immunization administered or previously received () - - 07/30/2020  o ACO-13: Fall Risk Screening with 2 or more falls in past year or any fall with injury in the past year (1100F) - - 07/30/2020   3 falls recently  · Medications  o Keflex 500 mg oral capsule   SIG: take 1 capsule by oral route 3 times a day for 10 days   DISP: (30) capsules with 0 refills  Prescribed on 07/30/2020     o levothyroxine 25 mcg oral tablet   SIG: take 1 tablet (25 mcg) by oral route once daily for 90 days   DISP: (90) tablets with 1 refills  Adjusted on 07/30/2020     · Instructions  o Patient advised to monitor blood pressure (B/P) at home and journal readings. Patient informed that a B/P reading at home of more than 135/85 is considered hypertension. For readings greater tmgx463/90 or higher patient is advised to follow up in the office with readings for management. Patient advised to limit sodium intake.  o Depression Screen completed and scanned into the EMR under the  designated folder within the patient's documents.  o PHQ-9 result is 0 indicating no current risk for Depression  o The provider screening met the required time of 15 minutes.  o Patient has reported an intolerance to HmgCoA Inhibitors (statins).  o Take all medications as prescribed/directed.  o Patient was educated/instructed on their diagnosis, treatment and medications prior to discharge from the clinic today.  o Patient instructed to seek medical attention urgently for new or worsening symptoms.  o Call the office with any concerns or questions.  o Risks, benefits, and alternatives were discussed with the patient. The patient is aware of risks associated with: elevated BP and stroke and high chol  o Call with any concerns or questions. We will f.u in 3 months. Discussed the importance of taking her meds   o I do believe she is depressed, and a falls risk, she refused to do any therapy or f/u.   · Disposition  o Call or Return if symptoms worsen or persist.  o F/u appt in 3 months            Electronically Signed by: EARLE Khan -Author on July 30, 2020 10:35:11 AM

## 2021-05-13 NOTE — PROGRESS NOTES
"   Progress Note      Patient Name: Loly Terrazas   Patient ID: [id number]   Sex: Female   Birthdate: Luciana 10, 1929    Primary Care Provider: Keisha OG    Visit Date: November 11, 2020    Provider: EARLE Lo   Location: [facility]   Location Address: [address]  [place]  [id number]   Location Phone: [phone]          Chief Complaint     confusion  Patient states she took meds for 3 days and about went crazy       History Of Present Illness  Loly Terrazas is a 89 year old /White female who presents for evaluation and treatment of:      She tried for 3 days and the ringing started.  She couldn't sleep.  She said \"I was a wreck\".  She stopped her vitamins too and \"I am only taking the Synthroid and atenolol\".  She is doing better.  She was some confused.  She doesn't want to try anything for the anxiety.  She is worried about the side effects.    She said that \"I had a ear infection and now my dr told me to I can't remember anything due to that\"  She was  about 11-12 yr old when that happen.  She can't remember her phone #.     \"I couldn't take the half thyroid...I felt better with 1 tablet\".       Past Medical History  Disease Name Date Onset Notes   Allergic rhinitis due to allergen 11/05/2018 --    Anemia 11/14/2019 --    Anxiety disorder 10/26/2020 --    Arthritis --  --    COPD (chronic obstructive pulmonary disease) 11/05/2018 --    DDD (degenerative disc disease), lumbar 11/14/2019 --    Deafness --  --    Dyspnea 11/14/2019 --    Essential hypertension 11/05/2018 --    Fall 11/14/2019 --    Gait disturbance 11/05/2018 --    Herniated Disc L5-S1 --  --    History of stroke 11/05/2018 --    Hypothyroidism 11/05/2018 --    Insomnia, unspecified 05/02/2019 --    Joint pain 10/26/2020 --    Low hemoglobin 11/14/2019 --    Lumbago 08/21/2019 --    Memory change 11/05/2018 --    Renal insufficiency 10/26/2020 --    Scoliosis --  --  "   Sinus node dysfunction --  --    Statin intolerance 2019 --    Tremors --  --    Vein disorder 2018 --          Past Surgical History  Procedure Name Date Notes   Cholecystecomy --  --    Leg Surgery --  right leg fracture with pins   Thyroidectomy --  --          Medication List  Name Date Started Instructions   Adults Multivitamin 18 mg iron-400 mcg-25 mcg oral tablet  take 1 tablet by oral route daily   Aleve 220 mg oral capsule  take 1-2 capsules by oral route daily   atenolol 50 mg oral tablet 10/26/2020 take 1 tablet (50 mg) by oral route once daily for 90 days   Celebrex 200 mg oral capsule 10/26/2020 take 1 capsule (200 mg) by oral route once eveing   cetirizine 10 mg oral tablet 2020 take 1 tablet (10 mg) by oral route once daily for 90 days   levothyroxine 25 mcg oral tablet 10/26/2020 take 0.5 tablet by oral route daily for 90 days   meclizine 12.5 mg oral tablet 2020 take 1 tablet by oral route 2 times a day as needed   Vitamin C 500 mg oral tablet  take 1 tablet by oral route daily   Vitamin D3 400 unit oral tablet  take 1 tablet by oral route daily         Allergy List  Allergen Name Date Reaction Notes   Pneumovax 2019 --  --          Reproductive History  Menstrual   Pregnancy Summary   Total Pregnancies: 5 Full Term: 4 Premature: 1   Ab Induced: 0 Ab Spontaneous: 0 Ectopics: 0   Multiples: 0 Livin         Social History  Finding Status Start/Stop Quantity Notes   Alcohol Current some day --/-- --  1-2 beers week   Tobacco Former --/-- --  SMOKED A FEW YEARS         Immunizations  NameDate Admin Mfg Trade Name Lot Number Route Inj VIS Given VIS Publication   Aezgxdrfq85/14/2019 SKB Fluarix, quadrivalent, preservative free [id number]  RD 2019    Comments:    [id number]/14/2019 NE Not Entered [id number]  LD 2019    Comments:    Prevnar  UNK Unknown TradeName [id number]  RD 2019   Comments:          Review of  Systems  · Constitutional  o Denies  o : fever, fatigue, weight loss, weight gain  · Cardiovascular  o Denies  o : lower extremity edema, claudication, chest pressure, palpitations  · Respiratory  o Denies  o : shortness of breath, wheezing, cough, hemoptysis, dyspnea on exertion  · Gastrointestinal  o Denies  o : nausea, vomiting, diarrhea, constipation, abdominal pain  · Psychiatric  o Admits  o : anxiety  o Denies  o : depression, suicidal ideation, homicidal ideation      Physical Examination  · Constitutional  o Appearance  o : well-nourished, well developed, alert, in no acute distress  · Ears, Nose, Mouth and Throat  o Ears  o :   § External Ears  § : appearance within normal limits, no lesions present  § Otoscopic Examination  § : tympanic membrane appearance within normal limits bilaterally without perforations, mobility normal  · Neurologic  o Mental Status Examination  o :   § Orientation  § : grossly oriented to person, place and time--slt confused on conversation  o Cranial Nerves  o : cranial nerves intact and symmetric throughout  · Psychiatric  o Mood and Affect  o : mood normal, affect appropriate, denies any SI/HI          Assessment  · Generalized anxiety disorder     300.02/F41.1  · Hypothyroidism     244.9/E03.9      Plan  · Orders  o ACO-39: Current medications updated and reviewed (1159F, ) - - 11/11/2020  o ACO-15: Pneumococcal Vaccine Administered or Previously Received [facility] (4040F) - - 11/11/2020  o ACO-14: Influenza immunization administered or previously received [facility] () - - 11/11/2020  · Medications  o levothyroxine 25 mcg oral tablet   SIG: take 1 tablet by oral route daily for 90 days   DISP: (90) Tablet with 1 refills  Adjusted on 11/11/2020     o Medications have been Reconciled  o Transition of Care or Provider Policy  · Instructions  o Patient was educated/instructed on their diagnosis, treatment and medications prior to discharge from the clinic today.  o Patient  instructed to seek medical attention urgently for new or worsening symptoms.  o Call the office with any concerns or questions.  o We will not start any meds for anxiety at this time. Call with any concerns or questions.            Electronically Signed by: EARLE Lo -Author on November 11, 2020 04:33:11 PM

## 2021-05-13 NOTE — PROGRESS NOTES
"   Progress Note      Patient Name: Loly Terrazas   Patient ID: [id number]   Sex: Female   Birthdate: Luciana 10, 1929    Primary Care Provider: Keisha OG    Visit Date: October 26, 2020    Provider: EARLE Lo   Location: [facility]   Location Address: [address]  [place], [place]  [id number]   Location Phone: [phone]          Chief Complaint     Follow up and med refills  Refuses flu shot today       History Of Present Illness  Loly Terrazas is a 89 year old /White female who presents for evaluation and treatment of:      She is here with her daughter who is in the room.  I reviewed her labs.    She is having some dizziness and she was having \"funny fast beating of the heart\".  Her pain is there and pain is walking.  She is trying to be independent and she is having pain in hte leg.  She is having legs pain at times.  She is also having some nerve issues.  She is having \"anxiety and the not knowing\".  She has tried patches for the legs.  She is using a walker and small wheelchair when needed.  She will take the ibu and Tylenol--it does help but just a little bit.    She said she would take something for her nerves.  She is having struggle and worried about the \"what ifs\"  LIPID:  She does not take chol med and will not take med.    Thyroid:  thyroid is overtreated and we will cut back.  HTN:  Uncontrolled and did take her meds.  \"I'm just sitting around waiting to die.\" She is agitated.         Past Medical History  Disease Name Date Onset Notes   Allergic rhinitis due to allergen 11/05/2018 --    Anemia 11/14/2019 --    Arthritis --  --    COPD (chronic obstructive pulmonary disease) 11/05/2018 --    DDD (degenerative disc disease), lumbar 11/14/2019 --    Deafness --  --    Dyspnea 11/14/2019 --    Essential hypertension 11/05/2018 --    Fall 11/14/2019 --    Gait disturbance 11/05/2018 --    Herniated Disc L5-S1 --  --    History of stroke " 2018 --    Hypothyroidism 2018 --    Insomnia, unspecified 2019 --    Low hemoglobin 2019 --    Lumbago 2019 --    Memory change 2018 --    Scoliosis --  --    Sinus node dysfunction --  --    Statin intolerance 2019 --    Tremors --  --    Vein disorder 2018 --          Past Surgical History  Procedure Name Date Notes   Cholecystecomy --  --    Leg Surgery --  right leg fracture with pins   Thyroidectomy --  --          Medication List  Name Date Started Instructions   Adults Multivitamin 18 mg iron-400 mcg-25 mcg oral tablet  take 1 tablet by oral route daily   Aleve 220 mg oral capsule  take 1-2 capsules by oral route daily   atenolol 50 mg oral tablet 10/26/2020 take 1 tablet (50 mg) by oral route once daily for 90 days   cetirizine 10 mg oral tablet 2020 take 1 tablet (10 mg) by oral route once daily for 90 days   levothyroxine 25 mcg oral tablet 2020 take 1 tablet (25 mcg) by oral route once daily for 90 days   meclizine 12.5 mg oral tablet 2020 take 1 tablet by oral route 2 times a day as needed   Vitamin C 500 mg oral tablet  take 1 tablet by oral route daily   Vitamin D3 400 unit oral tablet  take 1 tablet by oral route daily         Allergy List  Allergen Name Date Reaction Notes   Pneumovax 2019 --  --        Allergies Reconciled  Reproductive History  Menstrual   Pregnancy Summary   Total Pregnancies: 5 Full Term: 4 Premature: 1   Ab Induced: 0 Ab Spontaneous: 0 Ectopics: 0   Multiples: 0 Livin         Social History  Finding Status Start/Stop Quantity Notes   Alcohol Current some day --/-- --  1-2 beers week   Tobacco Former --/-- --  SMOKED A FEW YEARS         Immunizations  NameDate Admin Mfg Trade Name Lot Number Route Inj VIS Given VIS Publication   Uvokdqdfk04/14/2019 SKB Fluarix, quadrivalent, preservative free [id number] IM RD 2019    Comments:    Jviazcuwi4550/14/2019 NE Not Entered [id number] IM LD 2019     Comments:    Prevnar 1302/04/2019 UNK Unknown TradeName [id number] IM RD 02/04/2019 11/05/2015   Comments:          Review of Systems  · Constitutional  o Admits  o : fatigue  o Denies  o : fever, weight loss  · Cardiovascular  o Denies  o : chest pain, irregular heart beats  · Respiratory  o Admits  o : cough  o Denies  o : shortness of breath, wheezing  · Gastrointestinal  o Denies  o : nausea, vomiting, diarrhea, constipation  · Neurologic  o Admits  o : muscular weakness  o Denies  o : altered mental status  · Musculoskeletal  o Admits  o : joint pain, muscle pain  · Psychiatric  o Denies  o : anxiety, depression      Vitals  Date Time BP Position Site L\R Cuff Size HR RR TEMP (F) WT  HT  BMI kg/m2 BSA m2 O2 Sat FR L/min FiO2 HC       10/26/2020 11:29 /85 Sitting    83 - R 24 97.3 145lbs 8oz 5'   28.42 1.67 97 %      10/26/2020 11:32 /70 Sitting                       Physical Examination  · Constitutional  o Appearance  o : well-nourished, well developed, alert, in no acute distress  · Neck  o Inspection/Palpation  o : normal appearance, no masses or tenderness, trachea midline  · Respiratory  o Respiratory Effort  o : breathing unlabored  o Auscultation of Lungs  o : clear bilaterally  · Cardiovascular  o Heart  o :   § Auscultation of Heart  § : regular rate and rhythm, no murmurs, gallops or rubs  § Palpation of Heart  § : normal apical impulse, no cardiac thrill present  o Peripheral Vascular System  o :   § Carotid Arteries  § : normal pulses bilaterally, no bruits present  § Pedal Pulses  § : pulses 2 bilaterally  § Extremities  § : trace mike lower edema; less than 2 second refill noted  · Neurologic  o Mental Status Examination  o :   § Orientation  § : she is unable to tell me date, time  o Cranial Nerves  o : cranial nerves intact bilaterally, no nystagmus present  · Psychiatric  o Mood and Affect  o : agitated          Assessment  · Allergic rhinitis due to  allergen     477.9/J30.9  · Anemia     285.9/D64.9  · Anxiety disorder     300.00/F41.9  · COPD (chronic obstructive pulmonary disease)     496/J44.9  · Essential hypertension     401.9/I10  · Hypothyroidism     244.9/E03.9  · Insomnia, unspecified     780.52/G47.00  · Lumbago     724.2/M54.5  · Statin intolerance     995.27/Z78.9  · History of stroke     V12.54/Z86.73  · Gait disturbance     781.2/R26.9  · Memory change     780.93/R41.3  · Fall     E888.9/W19.XXXA  · Dyspnea     786.09/R06.00  · DDD (degenerative disc disease), lumbar     722.52/M51.36  · Renal insufficiency     593.9/N28.9  · Joint pain     719.40/M25.50      Plan  · Orders  o ACO-42: Not currently on a statin or hasn't received an order for a statin due to documented medical reason () - 995.27/Z78.9 - 10/26/2020  o Thyroid Profile (THYII) - - 12/07/2020  o ACO-39: Current medications updated and reviewed () - - 10/26/2020  o ACO-15: Pneumococcal Vaccine Administered or Previously Received (4040F) - - 10/26/2020  o ACO-14: Influenza immunization was not administered for reasons documented () - - 10/26/2020   refused  · Medications  o Celebrex 200 mg oral capsule   SIG: take 1 capsule (200 mg) by oral route once eveing   DISP: (30) Capsule with 2 refills  Prescribed on 10/26/2020     o Cymbalta 20 mg oral capsule,delayed release(/EC)   SIG: take 1 capsule by oral route daily for 30 days   DISP: (30) Capsule with 2 refills  Prescribed on 10/26/2020     o levothyroxine 25 mcg oral tablet   SIG: take 0.5 tablet by oral route daily for 90 days   DISP: (45) Tablet with 1 refills  Adjusted on 10/26/2020     o atenolol 50 mg oral tablet   SIG: take 1 tablet (50 mg) by oral route once daily for 90 days   DISP: (90) Tablet with 1 refills  Refilled on 10/26/2020     · Instructions  o Patient advised to monitor blood pressure (B/P) at home and journal readings. Patient informed that a B/P reading at home of more than 135/85 is considered  hypertension. For readings greater tkot566/90 or higher patient is advised to follow up in the office with readings for management. Patient advised to limit sodium intake.  o Patient has reported an intolerance to HmgCoA Inhibitors (statins).  o Take all medications as prescribed/directed.  o Patient was educated/instructed on their diagnosis, treatment and medications prior to discharge from the clinic today.  o Patient instructed to seek medical attention urgently for new or worsening symptoms.  o Call the office with any concerns or questions.  o Risks, benefits, and alternatives were discussed with the patient. The patient is aware of risks associated with: elevated BP and stroke and high chol  o Call with any concerns or questions. We will f.u in 3 months. Discussed the importance of taking her meds. We will trail the Cymbalta and the Celebrex for the pain/nerves. F.U in 3 months for labs and appt. We will do a decreased of the thyroid lab.  o Electronically Identified Patient Education Materials Provided Electronically            Electronically Signed by: EARLE Lo -Author on October 26, 2020 12:02:36 PM

## 2021-05-14 VITALS
BODY MASS INDEX: 27.99 KG/M2 | TEMPERATURE: 97.5 F | OXYGEN SATURATION: 95 % | HEIGHT: 60 IN | SYSTOLIC BLOOD PRESSURE: 174 MMHG | HEART RATE: 72 BPM | WEIGHT: 142.56 LBS | RESPIRATION RATE: 12 BRPM | DIASTOLIC BLOOD PRESSURE: 63 MMHG

## 2021-05-14 VITALS
WEIGHT: 143.12 LBS | DIASTOLIC BLOOD PRESSURE: 79 MMHG | OXYGEN SATURATION: 95 % | HEIGHT: 60 IN | BODY MASS INDEX: 28.1 KG/M2 | HEART RATE: 101 BPM | RESPIRATION RATE: 14 BRPM | TEMPERATURE: 98.1 F | SYSTOLIC BLOOD PRESSURE: 153 MMHG

## 2021-05-14 VITALS
RESPIRATION RATE: 24 BRPM | TEMPERATURE: 97.3 F | HEART RATE: 83 BPM | WEIGHT: 145.5 LBS | OXYGEN SATURATION: 97 % | BODY MASS INDEX: 28.57 KG/M2 | SYSTOLIC BLOOD PRESSURE: 184 MMHG | DIASTOLIC BLOOD PRESSURE: 85 MMHG | HEIGHT: 60 IN

## 2021-05-14 VITALS
DIASTOLIC BLOOD PRESSURE: 90 MMHG | HEIGHT: 60 IN | HEART RATE: 88 BPM | BODY MASS INDEX: 27.95 KG/M2 | WEIGHT: 142.37 LBS | TEMPERATURE: 97.3 F | SYSTOLIC BLOOD PRESSURE: 172 MMHG | OXYGEN SATURATION: 93 % | RESPIRATION RATE: 12 BRPM

## 2021-05-14 NOTE — PROGRESS NOTES
"   Progress Note      Patient Name: Loly Terrazas   Patient ID: [id number]   Sex: Female   Birthdate: Luciana 10, 1929    Primary Care Provider: Keisha OG    Visit Date: January 19, 2021    Provider: EARLE Lo   Location: [facility]   Location Address: [address]  [place], [place]  [id number]   Location Phone: [phone]          Chief Complaint     3 month follow up       History Of Present Illness  Loly Terrazas is a 89 year old /White female who presents for evaluation and treatment of:      She is here with her daughter who is in the room.    Her daughter wants her ablility to move due her pain.  She is not able to walk very well.  She is taking the Celebrex and OTC med and she is stubbling in the pain.  Her legs are swelling at time--lumpy leg and unexplained bruising.  She doesn't want to be testing.  \"I do not want any testing and I don't want to do anything\".  She will take naps her daughter.  She is getting more confused--\"I think I am not confused but everyone is confused\"    LIPID:  She does not take chol med and will not take med.    Thyroid:  She wants back on the Synthroid 50 and she wants to increase \"I feel better on the 50\"  HTN:  She is taking her meds  She is feeling good.       Past Medical History  Disease Name Date Onset Notes   Allergic rhinitis due to allergen 11/05/2018 --    Anemia 11/14/2019 --    Anxiety disorder 10/26/2020 --    Arthritis --  --    COPD (chronic obstructive pulmonary disease) 11/05/2018 --    DDD (degenerative disc disease), lumbar 11/14/2019 --    Deafness --  --    Dyspnea 11/14/2019 --    Essential hypertension 11/05/2018 --    Fall 11/14/2019 --    Gait disturbance 11/05/2018 --    Generalized anxiety disorder 11/11/2020 --    Herniated Disc L5-S1 --  --    History of stroke 11/05/2018 --    Hypothyroidism 11/05/2018 --    Insomnia, unspecified 05/02/2019 --    Joint pain 10/26/2020 --    Low " hemoglobin 2019 --    Lumbago 2019 --    Memory change 2018 --    Renal insufficiency 10/26/2020 --    Scoliosis --  --    Sinus node dysfunction --  --    Statin intolerance 2019 --    Tremors --  --    Vein disorder 2018 --          Past Surgical History  Procedure Name Date Notes   Cholecystecomy --  --    Leg Surgery --  right leg fracture with pins   Thyroidectomy --  --          Medication List  Name Date Started Instructions   Adults Multivitamin 18 mg iron-400 mcg-25 mcg oral tablet  take 1 tablet by oral route daily   Aleve 220 mg oral capsule  take 1-2 capsules by oral route daily   atenolol 50 mg oral tablet 10/26/2020 take 1 tablet (50 mg) by oral route once daily for 90 days   Celebrex 200 mg oral capsule 10/26/2020 take 1 capsule (200 mg) by oral route once eveing   levothyroxine 25 mcg oral tablet 2020 take 1 tablet by oral route daily for 90 days   meclizine 12.5 mg oral tablet 2020 take 1 tablet by oral route 2 times a day as needed   Vitamin C 500 mg oral tablet  take 1 tablet by oral route daily   Vitamin D3 400 unit oral tablet  take 1 tablet by oral route daily         Allergy List  Allergen Name Date Reaction Notes   Pneumovax 2019 --  --        Allergies Reconciled  Reproductive History  Menstrual   Pregnancy Summary   Total Pregnancies: 5 Full Term: 4 Premature: 1   Ab Induced: 0 Ab Spontaneous: 0 Ectopics: 0   Multiples: 0 Livin         Social History  Finding Status Start/Stop Quantity Notes   Alcohol Current some day --/-- --  1-2 beers week   Tobacco Former --/-- --  SMOKED A FEW YEARS         Immunizations  NameDate Admin Mfg Trade Name Lot Number Route Inj VIS Given VIS Publication   Zmlhvqjju50/14/2019 SKB Fluarix, quadrivalent, preservative free [id number] IM RD 2019    Comments:    Egftxotxy4200/14/2019 NE Not Entered [id number] IM LD 2019    Comments:    Prevnar  UNK Unknown TradeName [id number] IM RD 2019  11/05/2015   Comments:          Review of Systems  · Constitutional  o Admits  o : fatigue  o Denies  o : fever, weight loss  · Cardiovascular  o Denies  o : chest pain, irregular heart beats  · Respiratory  o Denies  o : shortness of breath, wheezing  · Gastrointestinal  o Denies  o : nausea, vomiting, diarrhea, constipation  · Neurologic  o Admits  o : muscular weakness  o Denies  o : altered mental status  · Musculoskeletal  o Admits  o : joint pain, muscle pain  · Psychiatric  o Denies  o : anxiety, depression      Vitals  Date Time BP Position Site L\R Cuff Size HR RR TEMP (F) WT  HT  BMI kg/m2 BSA m2 O2 Sat FR L/min FiO2 HC       01/19/2021 10:40 /79 Sitting    101 - R 14 98.1 143lbs 2oz 5'   27.95 1.66 95 %      01/19/2021 10:50 /80 Sitting                       Physical Examination  · Constitutional  o Appearance  o : well-nourished, well developed, alert, in no acute distress  · Neck  o Inspection/Palpation  o : normal appearance, no masses or tenderness, trachea midline  · Respiratory  o Respiratory Effort  o : breathing unlabored  o Auscultation of Lungs  o : clear bilaterally  · Cardiovascular  o Heart  o :   § Auscultation of Heart  § : regular rate and rhythm, no murmurs, gallops or rubs  § Palpation of Heart  § : normal apical impulse, no cardiac thrill present  o Peripheral Vascular System  o :   § Carotid Arteries  § : normal pulses bilaterally, no bruits present  § Pedal Pulses  § : pulses 2 bilaterally  § Extremities  § : trace mike lower edema; less than 2 second refill noted  · Neurologic  o Mental Status Examination  o :   § Orientation  § : she is unable to tell me date, time  o Cranial Nerves  o : cranial nerves intact bilaterally, no nystagmus present  · Psychiatric  o Mood and Affect  o : agitated          Assessment  · Allergic rhinitis due to allergen     477.9/J30.9  · Anemia     285.9/D64.9  · Anxiety disorder     300.00/F41.9  · COPD (chronic obstructive pulmonary  disease)     496/J44.9  · Essential hypertension     401.9/I10  · Hypothyroidism     244.9/E03.9  · Insomnia, unspecified     780.52/G47.00  · Lumbago     724.2/M54.5  · Statin intolerance     995.27/Z78.9  · History of stroke     V12.54/Z86.73  · Gait disturbance     781.2/R26.9  · Memory change     780.93/R41.3  · Fall     E888.9/W19.XXXA  · Dyspnea     786.09/R06.00  · DDD (degenerative disc disease), lumbar     722.52/M51.36  · Renal insufficiency     593.9/N28.9  · Joint pain     719.40/M25.50      Plan  · Orders  o ACO-42: Not currently on a statin or hasn't received an order for a statin due to documented medical reason () - 995.27/Z78.9 - 01/19/2021  o CBC with Auto Diff [facility] ([id number]) - - 01/19/2021  o CMP [facility] ([id number]) - - 01/19/2021  o Thyroid Profile (THYII) - - 01/19/2021  o ACO-39: Current medications updated and reviewed () - - 01/19/2021  o ACO-15: Pneumococcal Vaccine Administered or Previously Received (4040F) - - 01/19/2021  o ACO-14: Influenza immunization was not administered for reasons documented () - - 01/19/2021  · Medications  o levothyroxine 50 mcg oral tablet   SIG: take 1 tablet (50 mcg) by oral route once daily for 90 days   DISP: (90) Tablet with 0 refills  Adjusted on 01/19/2021     o Medications have been Reconciled  o Transition of Care or Provider Policy  · Instructions  o Patient advised to monitor blood pressure (B/P) at home and journal readings. Patient informed that a B/P reading at home of more than 135/85 is considered hypertension. For readings greater nbde463/90 or higher patient is advised to follow up in the office with readings for management. Patient advised to limit sodium intake.  o Patient has reported an intolerance to HmgCoA Inhibitors (statins).  o Take all medications as prescribed/directed.  o Patient was educated/instructed on their diagnosis, treatment and medications prior to discharge from the clinic today.  o Patient instructed to seek medical  attention urgently for new or worsening symptoms.  o Call the office with any concerns or questions.  o Risks, benefits, and alternatives were discussed with the patient. The patient is aware of risks associated with: elevated BP and stroke and high chol  o Call with any concerns or questions. We will f.u in 3 months. Discussed the importance of taking her meds. F.U in 3 months for labs and appt. She wants to go back to 50 mcg. Let me know if she wants pain mgt. She doesn't want to do anything. Spoke with her daughter and we justine discuss if she passes at home what to do in that situation.            Electronically Signed by: EARLE Lo -Author on January 19, 2021 11:22:43 AM

## 2021-05-14 NOTE — PROGRESS NOTES
Progress Note      Patient Name: Loly Terrazas   Patient ID: [id number]   Sex: Female   Birthdate: Luciana 10, 1929    Primary Care Provider: Keisha OG    Visit Date: April 13, 2021    Provider: EARLE Lo   Location: [facility]   Location Address: [address]  [place]  [id number]   Location Phone: [phone]          Chief Complaint  · Annual Wellness Exam      History Of Present Illness  The patient is a 89 year old /White female who has come to this office for her Annual Wellness Visit.   Her Primary Care Provider is Keisha OG. Her comprehensive Care Team list, including suppliers, has been updated on the Facesheet. Her medical/family history, height, weight, BMI, and blood pressure have been reviewed and are in the chart. The Health Risk Assessment has been completed and scanned in the chart.   Medications are listed in the medication list.   The active problem list includes: Allergic rhinitis due to allergen, Anemia, COPD (chronic obstructive pulmonary disease), DDD (degenerative disc disease), lumbar, Dyspnea, Essential hypertension, Fall, Gait disturbance, Generalized anxiety disorder, History of stroke, Hypothyroidism, Insomnia, unspecified, Joint pain, Lumbago, Memory change, Renal insufficiency, Statin intolerance, and Vein disorder   The patient does not have a history of substance use.   Patient reports her diet is adequate.   The Mini-Cog has been administered and is scanned in chart. The results are negative. Her cognitive function is without limitation.   A hearing loss screen was completed today and the result is negative.   Patient does not have any risk factors for depression. Patient completed the PHQ-9 today and it has been scanned in the chart. The total score is 0.   The Timed Up and Go screen was administered today and the result is negative. She is using the walker.   The [place] Index of [place] in  ADLs indicated full function (score of 6).   A Falls Risk Assessment has been completed, including a review of home fall hazards and medication review.   Overall, the patient's functional ability is noted by this provider to be within normal limits. Her level of safety is noted to be within normal limits. Her balance/gait is within normal limits. There have been two or more falls in the past year. Patient-specific home safety recommendations have been reviewed and a copy has been given to patient.   She admits issues with leaking urine. This happens infrequently and she would not like to discuss this further today.   There are no additional risk factors identified.   Living Will/Advanced Directive has not previously been completed.   Personalized health advice was given to the patient and a written health screening schedule was established; see Plan for details.       Past Medical History  Disease Name Date Onset Notes   Allergic rhinitis due to allergen 11/05/2018 --    Anemia 11/14/2019 --    Anxiety disorder 10/26/2020 --    Arthritis --  --    COPD (chronic obstructive pulmonary disease) 11/05/2018 --    DDD (degenerative disc disease), lumbar 11/14/2019 --    Deafness --  --    Dyspnea 11/14/2019 --    Essential hypertension 11/05/2018 --    Fall 11/14/2019 --    Gait disturbance 11/05/2018 --    Generalized anxiety disorder 11/11/2020 --    Herniated Disc L5-S1 --  --    History of stroke 11/05/2018 --    Hypothyroidism 11/05/2018 --    Insomnia, unspecified 05/02/2019 --    Joint pain 10/26/2020 --    Low hemoglobin 11/14/2019 --    Lumbago 08/21/2019 --    Memory change 11/05/2018 --    Renal insufficiency 10/26/2020 --    Scoliosis --  --    Sinus node dysfunction --  --    Statin intolerance 11/14/2019 --    Tremors --  --    Vein disorder 11/05/2018 --          Past Surgical History  Procedure Name Date Notes   Cholecystecomy --  --    Leg Surgery --  right leg fracture with pins   Thyroidectomy --  --           Medication List  Name Date Started Instructions   Adults Multivitamin 18 mg iron-400 mcg-25 mcg oral tablet  take 1 tablet by oral route daily   Aleve 220 mg oral capsule  take 1-2 capsules by oral route daily   atenolol 50 mg oral tablet 10/26/2020 take 1 tablet (50 mg) by oral route once daily for 90 days   Celebrex 200 mg oral capsule 10/26/2020 take 1 capsule (200 mg) by oral route once eveing   levothyroxine 50 mcg oral tablet 2021 take 1 tablet (50 mcg) by oral route once daily for 90 days   meclizine 12.5 mg oral tablet 2020 take 1 tablet by oral route 2 times a day as needed   Vitamin C 500 mg oral tablet  take 1 tablet by oral route daily   Vitamin D3 400 unit oral tablet  take 1 tablet by oral route daily         Allergy List  Allergen Name Date Reaction Notes   Pneumovax 2019 --  --        Allergies Reconciled  Reproductive History  Menstrual   Pregnancy Summary   Total Pregnancies: 5 Full Term: 4 Premature: 1   Ab Induced: 0 Ab Spontaneous: 0 Ectopics: 0   Multiples: 0 Livin         Social History  Finding Status Start/Stop Quantity Notes   Alcohol Current some day --/-- --  1-2 beers week   Tobacco Former --/-- --  SMOKED A FEW YEARS         Immunizations  NameDate Admin Mfg Trade Name Lot Number Route Inj VIS Given VIS Publication   Oncyhexet19/14/2019 SKB Fluarix, quadrivalent, preservative free [id number] [facility] 2019    Comments:    Qiqipxvmw6873/14/2019 NE Not Entered [id number] [facility] 2019    Comments:    Prevnar 1302019 UNK Unknown TradeName [id number]  RD 2019   Comments:          Review of Systems  · Constitutional  o Denies  o : fever      Vitals  Date Time BP Position Site L\R Cuff Size HR RR TEMP (F) WT  HT  BMI kg/m2 BSA m2 O2 Sat FR L/min FiO2 HC       2021 10:43 /90 Sitting    88 - R 12 97.3 142lbs 6oz 5'   27.81 1.65 93 %            Physical Examination  · Constitutional  o Appearance  o : well-nourished, well developed,  alert, in no acute distress          Assessment  · Encounter for Medicare annual wellness exam     V70.0/Z00.00  · Screening for depression     V79.0/Z13.89  · Screening for alcoholism     V79.1/Z13.39  · Advanced care planning/counseling discussion     V65.49/Z71.89  · Allergic rhinitis due to allergen     477.9/J30.9  · Anemia     285.9/D64.9  · COPD (chronic obstructive pulmonary disease)     496/J44.9  · DDD (degenerative disc disease), lumbar     722.52/M51.36  · Dyspnea     786.09/R06.00  · Essential hypertension     401.9/I10  · Fall     E888.9/W19.XXXA  · Gait disturbance     781.2/R26.9  · Generalized anxiety disorder     300.02/F41.1  · History of stroke     V12.54/Z86.73  · Hypothyroidism     244.9/E03.9  · Insomnia, unspecified     780.52/G47.00  · Joint pain     719.40/M25.50  · Lumbago     724.2/M54.5  · Memory change     780.93/R41.3  · Renal insufficiency     593.9/N28.9  · Vein disorder     459.9/I87.9      Plan  · Orders  o Falls Risk Assessment Completed (3288F) - V70.0/Z00.00 - 04/13/2021  o Brief hearing screening (written) [facility] () - V70.0/Z00.00 - 04/13/2021  o Annual Wellness Visit-includes a Personalized Prevention Plan of Service (PPS), SUBSEQUENT VISIT (Medicare) () - V70.0/Z00.00 - 04/13/2021  o ACO-13: Fall Risk Screening with 2 or more falls in past year or any fall with injury in the past year (1100F) - V70.0/Z00.00 - 04/13/2021  o Presence or absence of urinary incontinence assessed (JOSIAH) (1090F) - V70.0/Z00.00 - 04/13/2021  o ACO-18: Negative screen for clinical depression using a standardized tool () - V79.0/Z13.89 - 04/13/2021  o Negative alcohol screening () - V79.1/Z13.39 - 04/13/2021  o Influenza immunization was not ordered or administered for reasons documented by clinician () - - 04/13/2021  o ACO-15: Pneumococcal Vaccine Administered or Previously Received (4040F) - - 04/13/2021  o ACO-39: Current medications updated and reviewed (, 2439F) - -  04/13/2021  o ACO - Pt declines to or was not able to provide an Advance Care Plan or name a Surrogate Decision Maker (1124F) - - 04/13/2021  · Medications  o Medications have been Reconciled  o Transition of Care or Provider Policy  · Instructions  o Health Risk Assessment has been reviewed with the patient.  o Written health screening schedule for next 5-10 years was established with patient; information scanned in chart and given/mailed to patient.  o Fall prevention methods discussed and a copy of recommendations given/mailed to patient.  o Depression Screen completed and scanned into the EMR under the designated folder within the patient's documents.  o Today's PHQ-9 result is 0  o Audit-C Questionnaire completed and scanned into the EMR under the designated folder within the patient's documents.  o Audit-C score of 0-4 - Negative Screen - Brief Discussion  · Disposition  o Call or Return if symptoms worsen or persist.  o Return Visit Request in/on 1 year +/- 2 days (43152).            Electronically Signed by: EARLE Lo -Author on April 13, 2021 11:19:40 AM

## 2021-05-14 NOTE — PROGRESS NOTES
Progress Note      Patient Name: Loly Terrazas   Patient ID: [id number]   Sex: Female   Birthdate: Luciana 10, 1929    Primary Care Provider: Keisha OG    Visit Date: April 13, 2021    Provider: EARLE Lo   Location: [facility]   Location Address: [address]  [place], [place]  [id number]   Location Phone: [phone]          Chief Complaint     3 month follow up COPD, Hypothyroidism       History Of Present Illness  Loly Terrazas is a 89 year old /White female who presents for evaluation and treatment of:      She is here with her daughter who is in the room.      LIPID:  She does not take chol med and will not take med.    Thyroid:  She likes 50mcg daily of synthroid  HTN:  She is taking her atenolol only  She is feeling good.       Past Medical History  Disease Name Date Onset Notes   Allergic rhinitis due to allergen 11/05/2018 --    Anemia 11/14/2019 --    Anxiety disorder 10/26/2020 --    Arthritis --  --    COPD (chronic obstructive pulmonary disease) 11/05/2018 --    DDD (degenerative disc disease), lumbar 11/14/2019 --    Deafness --  --    Dyspnea 11/14/2019 --    Essential hypertension 11/05/2018 --    Fall 11/14/2019 --    Gait disturbance 11/05/2018 --    Generalized anxiety disorder 11/11/2020 --    Herniated Disc L5-S1 --  --    History of stroke 11/05/2018 --    Hypothyroidism 11/05/2018 --    Insomnia, unspecified 05/02/2019 --    Joint pain 10/26/2020 --    Low hemoglobin 11/14/2019 --    Lumbago 08/21/2019 --    Memory change 11/05/2018 --    Renal insufficiency 10/26/2020 --    Scoliosis --  --    Sinus node dysfunction --  --    Statin intolerance 11/14/2019 --    Tremors --  --    Vein disorder 11/05/2018 --          Past Surgical History  Procedure Name Date Notes   Cholecystecomy --  --    Leg Surgery --  right leg fracture with pins   Thyroidectomy --  --          Medication List  Name Date Started Instructions   Adults  Multivitamin 18 mg iron-400 mcg-25 mcg oral tablet  take 1 tablet by oral route daily   Aleve 220 mg oral capsule  take 1-2 capsules by oral route daily   atenolol 50 mg oral tablet 2021 take 1 tablet (50 mg) by oral route once daily for 90 days   levothyroxine 50 mcg oral tablet 2021 take 1 tablet (50 mcg) by oral route once daily for 90 days   meclizine 12.5 mg oral tablet 2021 take 1 tablet by oral route 2 times a day as needed   Vitamin C 500 mg oral tablet  take 1 tablet by oral route daily   Vitamin D3 400 unit oral tablet  take 1 tablet by oral route daily         Allergy List  Allergen Name Date Reaction Notes   Pneumovax 2019 --  --        Allergies Reconciled  Reproductive History  Menstrual   Pregnancy Summary   Total Pregnancies: 5 Full Term: 4 Premature: 1   Ab Induced: 0 Ab Spontaneous: 0 Ectopics: 0   Multiples: 0 Livin         Social History  Finding Status Start/Stop Quantity Notes   Alcohol Current some day --/-- --  1-2 beers week   Tobacco Former --/-- --  SMOKED A FEW YEARS         Immunizations  NameDate Admin Mfg Trade Name Lot Number Route Inj VIS Given VIS Publication   Evovinrut64/14/2019 SKB Fluarix, quadrivalent, preservative free [id number] [facility] 2019    Comments:    Zakgzhczx8660/14/2019 NE Not Entered [id number] [facility] 2019    Comments:    Prevnar  UNK Unknown TradeName [id number] [facility] 2019   Comments:          Review of Systems  · Constitutional  o Admits  o : fatigue  o Denies  o : fever, weight gain  · Cardiovascular  o Denies  o : lower extremity edema, claudication, chest pressure, palpitations  · Respiratory  o Admits  o : shortness of breath  o Denies  o : wheezing, cough, hemoptysis, dyspnea on exertion  · Gastrointestinal  o Denies  o : nausea, vomiting, diarrhea, constipation, abdominal pain  · Integument  o Denies  o : rash  · Psychiatric  o Denies  o : anxiety, depression, suicidal ideation, homicidal  ideation      Vitals  Date Time BP Position Site L\R Cuff Size HR RR TEMP (F) WT  HT  BMI kg/m2 BSA m2 O2 Sat FR L/min FiO2 HC       04/13/2021 10:43 /90 Sitting    88 - R 12 97.3 142lbs 6oz 5'   27.81 1.65 93 %            Physical Examination  · Constitutional  o Appearance  o : well-nourished, well developed, alert, in no acute distress  · Neck  o Inspection/Palpation  o : normal appearance, no masses or tenderness, trachea midline  · Respiratory  o Respiratory Effort  o : breathing unlabored  o Auscultation of Lungs  o : clear bilaterally  · Cardiovascular  o Heart  o :   § Auscultation of Heart  § : regular rate and rhythm, no murmurs, gallops or rubs  § Palpation of Heart  § : normal apical impulse, no cardiac thrill present  o Peripheral Vascular System  o :   § Carotid Arteries  § : normal pulses bilaterally, no bruits present  § Pedal Pulses  § : pulses 2 bilaterally  § Extremities  § : trace mike lower edema; less than 2 second refill noted  · Neurologic  o Mental Status Examination  o :   § Orientation  § : she is unable to tell me date, time  o Cranial Nerves  o : cranial nerves intact bilaterally, no nystagmus present  · Psychiatric  o Mood and Affect  o : agitated          Assessment  · Allergic rhinitis due to allergen     477.9/J30.9  · Anemia     285.9/D64.9  · Anxiety disorder     300.00/F41.9  · COPD (chronic obstructive pulmonary disease)     496/J44.9  · Essential hypertension     401.9/I10  · Hypothyroidism     244.9/E03.9  · Insomnia, unspecified     780.52/G47.00  · Lumbago     724.2/M54.5  · Statin intolerance     995.27/Z78.9  · History of stroke     V12.54/Z86.73  · Gait disturbance     781.2/R26.9  · Memory change     780.93/R41.3  · DDD (degenerative disc disease), lumbar     722.52/M51.36  · Renal insufficiency     593.9/N28.9  · Joint pain     719.40/M25.50      Plan  · Orders  o ACO-42: Not currently on a statin or hasn't received an order for a statin due to documented medical  reason () - 995.27/Z78.9 - 04/13/2021  o ACO-39: Current medications updated and reviewed () - - 04/13/2021  o ACO-15: Pneumococcal Vaccine Administered or Previously Received (4040F) - - 04/13/2021  o ACO-14: Influenza immunization was not administered for reasons documented () - - 04/13/2021  o Physical, Primary Care Panel (CBC, CMP, Lipid, TSH) [facility] (50702, 34594, 46009, 42225) - - 04/13/2021  · Medications  o atenolol 50 mg oral tablet   SIG: take 1 tablet (50 mg) by oral route once daily for 90 days   DISP: (90) Tablet with 1 refills  Refilled on 04/13/2021     o levothyroxine 50 mcg oral tablet   SIG: take 1 tablet (50 mcg) by oral route once daily for 90 days   DISP: (90) Tablet with 1 refills  Refilled on 04/13/2021     o meclizine 12.5 mg oral tablet   SIG: take 1 tablet by oral route 2 times a day as needed   DISP: (180) Tablet with 1 refills  Refilled on 04/13/2021     · Instructions  o Patient advised to monitor blood pressure (B/P) at home and journal readings. Patient informed that a B/P reading at home of more than 135/85 is considered hypertension. For readings greater wxnh883/90 or higher patient is advised to follow up in the office with readings for management. Patient advised to limit sodium intake.  o Patient has reported an intolerance to HmgCoA Inhibitors (statins).  o Take all medications as prescribed/directed.  o Patient was educated/instructed on their diagnosis, treatment and medications prior to discharge from the clinic today.  o Patient instructed to seek medical attention urgently for new or worsening symptoms.  o Call the office with any concerns or questions.  o Risks, benefits, and alternatives were discussed with the patient. The patient is aware of risks associated with: elevated BP and stroke and high chol  o Call with any concerns or questions. We will f.u in 3 months. Discussed the importance of taking her meds. I have reviewed all medications and at this time no  medications changes need to be adjusted for all chronic conditions.  · Disposition  o Call or Return if symptoms worsen or persist.  o Return Visit Request in/on 3 months +/- 2 days ([id number]).            Electronically Signed by: EARLE Lo -Author on April 13, 2021 11:18:09 AM

## 2021-05-15 VITALS
TEMPERATURE: 98.8 F | HEIGHT: 60 IN | HEART RATE: 66 BPM | RESPIRATION RATE: 12 BRPM | SYSTOLIC BLOOD PRESSURE: 190 MMHG | OXYGEN SATURATION: 94 % | WEIGHT: 153 LBS | BODY MASS INDEX: 30.04 KG/M2 | DIASTOLIC BLOOD PRESSURE: 80 MMHG

## 2021-05-15 VITALS
BODY MASS INDEX: 29.52 KG/M2 | RESPIRATION RATE: 16 BRPM | OXYGEN SATURATION: 96 % | DIASTOLIC BLOOD PRESSURE: 63 MMHG | HEIGHT: 60 IN | WEIGHT: 150.37 LBS | TEMPERATURE: 98.4 F | SYSTOLIC BLOOD PRESSURE: 181 MMHG | HEART RATE: 75 BPM

## 2021-05-15 VITALS
DIASTOLIC BLOOD PRESSURE: 60 MMHG | SYSTOLIC BLOOD PRESSURE: 180 MMHG | BODY MASS INDEX: 30.83 KG/M2 | SYSTOLIC BLOOD PRESSURE: 170 MMHG | DIASTOLIC BLOOD PRESSURE: 62 MMHG | WEIGHT: 157.06 LBS | OXYGEN SATURATION: 95 % | TEMPERATURE: 98.6 F | HEIGHT: 60 IN | RESPIRATION RATE: 16 BRPM | HEART RATE: 65 BPM

## 2021-05-15 VITALS
WEIGHT: 148.19 LBS | DIASTOLIC BLOOD PRESSURE: 78 MMHG | RESPIRATION RATE: 12 BRPM | BODY MASS INDEX: 29.09 KG/M2 | HEIGHT: 60 IN | HEART RATE: 84 BPM | SYSTOLIC BLOOD PRESSURE: 138 MMHG | TEMPERATURE: 98.6 F | OXYGEN SATURATION: 93 %

## 2021-05-15 VITALS
TEMPERATURE: 98.4 F | DIASTOLIC BLOOD PRESSURE: 84 MMHG | OXYGEN SATURATION: 96 % | SYSTOLIC BLOOD PRESSURE: 142 MMHG | HEART RATE: 59 BPM | WEIGHT: 153.44 LBS | RESPIRATION RATE: 12 BRPM

## 2021-05-15 VITALS
HEART RATE: 63 BPM | SYSTOLIC BLOOD PRESSURE: 179 MMHG | RESPIRATION RATE: 24 BRPM | DIASTOLIC BLOOD PRESSURE: 65 MMHG | OXYGEN SATURATION: 92 % | HEIGHT: 60 IN | TEMPERATURE: 98 F | BODY MASS INDEX: 29.7 KG/M2 | WEIGHT: 151.25 LBS

## 2021-05-15 VITALS
SYSTOLIC BLOOD PRESSURE: 166 MMHG | HEART RATE: 68 BPM | DIASTOLIC BLOOD PRESSURE: 69 MMHG | HEIGHT: 60 IN | OXYGEN SATURATION: 96 % | BODY MASS INDEX: 30.11 KG/M2 | WEIGHT: 153.37 LBS | TEMPERATURE: 99.3 F | RESPIRATION RATE: 12 BRPM

## 2021-05-16 VITALS
SYSTOLIC BLOOD PRESSURE: 204 MMHG | DIASTOLIC BLOOD PRESSURE: 59 MMHG | RESPIRATION RATE: 12 BRPM | TEMPERATURE: 97.6 F | OXYGEN SATURATION: 97 % | HEART RATE: 63 BPM | WEIGHT: 146.37 LBS

## 2021-05-16 VITALS
DIASTOLIC BLOOD PRESSURE: 80 MMHG | TEMPERATURE: 98.2 F | DIASTOLIC BLOOD PRESSURE: 53 MMHG | SYSTOLIC BLOOD PRESSURE: 181 MMHG | HEART RATE: 65 BPM | WEIGHT: 149.31 LBS | OXYGEN SATURATION: 96 % | RESPIRATION RATE: 12 BRPM | SYSTOLIC BLOOD PRESSURE: 150 MMHG

## 2021-05-16 VITALS
WEIGHT: 149 LBS | DIASTOLIC BLOOD PRESSURE: 64 MMHG | RESPIRATION RATE: 12 BRPM | OXYGEN SATURATION: 94 % | TEMPERATURE: 98.9 F | SYSTOLIC BLOOD PRESSURE: 169 MMHG | HEART RATE: 66 BPM

## 2021-07-06 ENCOUNTER — OFFICE VISIT (OUTPATIENT)
Dept: FAMILY MEDICINE CLINIC | Facility: CLINIC | Age: 86
End: 2021-07-06

## 2021-07-06 VITALS
RESPIRATION RATE: 24 BRPM | DIASTOLIC BLOOD PRESSURE: 98 MMHG | SYSTOLIC BLOOD PRESSURE: 190 MMHG | WEIGHT: 143.5 LBS | BODY MASS INDEX: 28.03 KG/M2 | OXYGEN SATURATION: 100 % | TEMPERATURE: 98 F | HEART RATE: 81 BPM

## 2021-07-06 DIAGNOSIS — E03.9 ACQUIRED HYPOTHYROIDISM: ICD-10-CM

## 2021-07-06 DIAGNOSIS — M19.90 ARTHRITIS: ICD-10-CM

## 2021-07-06 DIAGNOSIS — D50.9 IRON DEFICIENCY ANEMIA, UNSPECIFIED IRON DEFICIENCY ANEMIA TYPE: ICD-10-CM

## 2021-07-06 DIAGNOSIS — I10 ESSENTIAL HYPERTENSION: ICD-10-CM

## 2021-07-06 DIAGNOSIS — F41.1 GENERALIZED ANXIETY DISORDER: ICD-10-CM

## 2021-07-06 DIAGNOSIS — J30.81 ALLERGIC RHINITIS DUE TO ANIMAL HAIR AND DANDER: Primary | ICD-10-CM

## 2021-07-06 DIAGNOSIS — N28.9 RENAL INSUFFICIENCY: ICD-10-CM

## 2021-07-06 PROBLEM — D64.9 ANEMIA: Status: ACTIVE | Noted: 2019-11-14

## 2021-07-06 PROBLEM — M41.9 SCOLIOSIS: Status: ACTIVE | Noted: 2021-07-06

## 2021-07-06 PROBLEM — J30.9 ALLERGIC RHINITIS DUE TO ALLERGEN: Status: ACTIVE | Noted: 2018-11-05

## 2021-07-06 PROBLEM — M51.26 HERNIATION OF LUMBAR INTERVERTEBRAL DISC WITHOUT MYELOPATHY: Status: ACTIVE | Noted: 2021-07-06

## 2021-07-06 PROBLEM — G47.00 INSOMNIA, UNSPECIFIED: Status: ACTIVE | Noted: 2019-05-02

## 2021-07-06 PROBLEM — H91.90 DEAFNESS: Status: ACTIVE | Noted: 2021-07-06

## 2021-07-06 PROBLEM — Z78.9 STATIN INTOLERANCE: Status: ACTIVE | Noted: 2019-11-14

## 2021-07-06 PROBLEM — M51.369 DDD (DEGENERATIVE DISC DISEASE), LUMBAR: Status: ACTIVE | Noted: 2019-11-14

## 2021-07-06 PROBLEM — Z86.73 HISTORY OF STROKE: Status: ACTIVE | Noted: 2018-11-05

## 2021-07-06 PROBLEM — R26.9 GAIT DISTURBANCE: Status: ACTIVE | Noted: 2018-11-05

## 2021-07-06 PROBLEM — J44.9 COPD (CHRONIC OBSTRUCTIVE PULMONARY DISEASE) (HCC): Status: ACTIVE | Noted: 2018-11-05

## 2021-07-06 PROBLEM — R41.3 MEMORY CHANGE: Status: ACTIVE | Noted: 2018-11-05

## 2021-07-06 PROBLEM — M51.36 DDD (DEGENERATIVE DISC DISEASE), LUMBAR: Status: ACTIVE | Noted: 2019-11-14

## 2021-07-06 LAB
ALBUMIN SERPL-MCNC: 4.4 G/DL (ref 3.5–5.2)
ALBUMIN/GLOB SERPL: 1.7 G/DL
ALP SERPL-CCNC: 62 U/L (ref 39–117)
ALT SERPL W P-5'-P-CCNC: 18 U/L (ref 1–33)
ANION GAP SERPL CALCULATED.3IONS-SCNC: 9.3 MMOL/L (ref 5–15)
AST SERPL-CCNC: 21 U/L (ref 1–32)
BASOPHILS # BLD AUTO: 0.04 10*3/MM3 (ref 0–0.2)
BASOPHILS NFR BLD AUTO: 0.7 % (ref 0–1.5)
BILIRUB SERPL-MCNC: 0.3 MG/DL (ref 0–1.2)
BUN SERPL-MCNC: 23 MG/DL (ref 8–23)
BUN/CREAT SERPL: 25.6 (ref 7–25)
CALCIUM SPEC-SCNC: 10 MG/DL (ref 8.2–9.6)
CHLORIDE SERPL-SCNC: 104 MMOL/L (ref 98–107)
CHOLEST SERPL-MCNC: 194 MG/DL (ref 0–200)
CO2 SERPL-SCNC: 29.7 MMOL/L (ref 22–29)
CREAT SERPL-MCNC: 0.9 MG/DL (ref 0.57–1)
DEPRECATED RDW RBC AUTO: 44.5 FL (ref 37–54)
EOSINOPHIL # BLD AUTO: 0.26 10*3/MM3 (ref 0–0.4)
EOSINOPHIL NFR BLD AUTO: 4.3 % (ref 0.3–6.2)
ERYTHROCYTE [DISTWIDTH] IN BLOOD BY AUTOMATED COUNT: 12.5 % (ref 12.3–15.4)
FERRITIN SERPL-MCNC: 54.6 NG/ML (ref 13–150)
FOLATE SERPL-MCNC: >20 NG/ML (ref 4.78–24.2)
GFR SERPL CREATININE-BSD FRML MDRD: 59 ML/MIN/1.73
GFR SERPL CREATININE-BSD FRML MDRD: 71 ML/MIN/1.73
GLOBULIN UR ELPH-MCNC: 2.6 GM/DL
GLUCOSE SERPL-MCNC: 92 MG/DL (ref 65–99)
HCT VFR BLD AUTO: 39.9 % (ref 34–46.6)
HDLC SERPL-MCNC: 50 MG/DL (ref 40–60)
HGB BLD-MCNC: 13 G/DL (ref 12–15.9)
IMM GRANULOCYTES # BLD AUTO: 0.01 10*3/MM3 (ref 0–0.05)
IMM GRANULOCYTES NFR BLD AUTO: 0.2 % (ref 0–0.5)
IRON 24H UR-MRATE: 76 MCG/DL (ref 37–145)
IRON SATN MFR SERPL: 19 % (ref 20–50)
LDLC SERPL CALC-MCNC: 127 MG/DL (ref 0–100)
LDLC/HDLC SERPL: 2.51 {RATIO}
LYMPHOCYTES # BLD AUTO: 2.17 10*3/MM3 (ref 0.7–3.1)
LYMPHOCYTES NFR BLD AUTO: 36.3 % (ref 19.6–45.3)
MCH RBC QN AUTO: 31.3 PG (ref 26.6–33)
MCHC RBC AUTO-ENTMCNC: 32.6 G/DL (ref 31.5–35.7)
MCV RBC AUTO: 96.1 FL (ref 79–97)
MONOCYTES # BLD AUTO: 0.45 10*3/MM3 (ref 0.1–0.9)
MONOCYTES NFR BLD AUTO: 7.5 % (ref 5–12)
NEUTROPHILS NFR BLD AUTO: 3.05 10*3/MM3 (ref 1.7–7)
NEUTROPHILS NFR BLD AUTO: 51 % (ref 42.7–76)
NRBC BLD AUTO-RTO: 0 /100 WBC (ref 0–0.2)
PLATELET # BLD AUTO: 160 10*3/MM3 (ref 140–450)
PMV BLD AUTO: 12.3 FL (ref 6–12)
POTASSIUM SERPL-SCNC: 4.2 MMOL/L (ref 3.5–5.2)
PROT SERPL-MCNC: 7 G/DL (ref 6–8.5)
RBC # BLD AUTO: 4.15 10*6/MM3 (ref 3.77–5.28)
SODIUM SERPL-SCNC: 143 MMOL/L (ref 136–145)
T4 FREE SERPL-MCNC: 1.01 NG/DL (ref 0.93–1.7)
TIBC SERPL-MCNC: 399 MCG/DL (ref 298–536)
TRANSFERRIN SERPL-MCNC: 268 MG/DL (ref 200–360)
TRIGL SERPL-MCNC: 93 MG/DL (ref 0–150)
TSH SERPL DL<=0.05 MIU/L-ACNC: 3.74 UIU/ML (ref 0.27–4.2)
VIT B12 BLD-MCNC: 301 PG/ML (ref 211–946)
VLDLC SERPL-MCNC: 17 MG/DL (ref 5–40)
WBC # BLD AUTO: 5.98 10*3/MM3 (ref 3.4–10.8)

## 2021-07-06 PROCEDURE — 84439 ASSAY OF FREE THYROXINE: CPT | Performed by: NURSE PRACTITIONER

## 2021-07-06 PROCEDURE — 82728 ASSAY OF FERRITIN: CPT | Performed by: NURSE PRACTITIONER

## 2021-07-06 PROCEDURE — 82088 ASSAY OF ALDOSTERONE: CPT | Performed by: NURSE PRACTITIONER

## 2021-07-06 PROCEDURE — 99214 OFFICE O/P EST MOD 30 MIN: CPT | Performed by: NURSE PRACTITIONER

## 2021-07-06 PROCEDURE — 83540 ASSAY OF IRON: CPT | Performed by: NURSE PRACTITIONER

## 2021-07-06 PROCEDURE — 80061 LIPID PANEL: CPT | Performed by: NURSE PRACTITIONER

## 2021-07-06 PROCEDURE — 84466 ASSAY OF TRANSFERRIN: CPT | Performed by: NURSE PRACTITIONER

## 2021-07-06 PROCEDURE — 80053 COMPREHEN METABOLIC PANEL: CPT | Performed by: NURSE PRACTITIONER

## 2021-07-06 PROCEDURE — 82746 ASSAY OF FOLIC ACID SERUM: CPT | Performed by: NURSE PRACTITIONER

## 2021-07-06 PROCEDURE — 82607 VITAMIN B-12: CPT | Performed by: NURSE PRACTITIONER

## 2021-07-06 PROCEDURE — 84443 ASSAY THYROID STIM HORMONE: CPT | Performed by: NURSE PRACTITIONER

## 2021-07-06 PROCEDURE — 85025 COMPLETE CBC W/AUTO DIFF WBC: CPT | Performed by: NURSE PRACTITIONER

## 2021-07-06 RX ORDER — MECLIZINE HCL 12.5 MG/1
12.5 TABLET ORAL 2 TIMES DAILY PRN
COMMUNITY
Start: 2021-05-07 | End: 2021-12-23

## 2021-07-06 RX ORDER — LEVOTHYROXINE SODIUM 0.05 MG/1
50 TABLET ORAL DAILY
COMMUNITY
Start: 2021-04-13 | End: 2021-07-06 | Stop reason: SDUPTHER

## 2021-07-06 RX ORDER — COVID-19 ANTIGEN TEST
KIT MISCELLANEOUS
COMMUNITY
End: 2021-12-23

## 2021-07-06 RX ORDER — ATENOLOL 50 MG/1
50 TABLET ORAL DAILY
Qty: 90 TABLET | Refills: 1 | Status: SHIPPED | OUTPATIENT
Start: 2021-07-06 | End: 2021-12-23 | Stop reason: SDUPTHER

## 2021-07-06 RX ORDER — ATENOLOL 50 MG/1
50 TABLET ORAL DAILY
COMMUNITY
Start: 2021-04-13 | End: 2021-07-06 | Stop reason: SDUPTHER

## 2021-07-06 RX ORDER — LEVOTHYROXINE SODIUM 0.05 MG/1
50 TABLET ORAL DAILY
Qty: 90 TABLET | Refills: 1 | Status: SHIPPED | OUTPATIENT
Start: 2021-07-06 | End: 2021-12-23 | Stop reason: SDUPTHER

## 2021-07-06 NOTE — ASSESSMENT & PLAN NOTE
She is tired and request blood work today to check her iron level.  She does have a history of iron deficiency in the past.  Pending labs

## 2021-07-06 NOTE — ASSESSMENT & PLAN NOTE
Patient is only taking her atenolol once a day.  She refuses to take any further medication.  We will do blood work today.  We will adjust med if patient will let us once lab work comes back.  No chest pain or shortness of breath noted.  Follow-up in 6 months.

## 2021-07-06 NOTE — ASSESSMENT & PLAN NOTE
She is currently not taking anything for her allergies.  She does have a history with cats that bother her.  Her daughter has cats above.  She does live in the basement of her daughter's house.  We will follow up in 6 months.

## 2021-07-06 NOTE — PROGRESS NOTES
Chief Complaint  Hypertension, Hyperlipidemia, and Follow-up    Subjective          Loly Terrazas presents to [facility]  See ROS below. Pertinent medical hx includes htn, THYROIDISM        Past Medical History:   • Allergic rhinitis due to allergen   • Anemia   • Anxiety disorder   • Arthritis   • COPD (chronic obstructive pulmonary disease) (CMS/HCC)   • DDD (degenerative disc disease), lumbar   • Deafness   • Dyspnea   • Essential hypertension   • Fall   • SEAN (generalized anxiety disorder)   • Gait disturbance   • History of stroke   • Hypothyroidism   • Insomnia   • Joint pain   • Low hemoglobin   • Lumbago   • Lumbar herniated disc    L5-S1   • Memory change   • Renal insufficiency   • Scoliosis   • Sinus node dysfunction (CMS/HCC)   • Statin intolerance   • Tremor   • Vein disorder       Allergies  Pneumococcal vac polyvalent    Past Surgical History:   • CHOLECYSTECTOMY   • LEG SURGERY    Fracture with pins   • THYROIDECTOMY       Social History     Tobacco Use   • Smoking status: Former Smoker     Packs/day: 0.25     Years: 15.00     Pack years: 3.75     Types: Cigarettes     Start date:      Quit date:      Years since quittin.5   • Smokeless tobacco: Never Used   Substance Use Topics   • Alcohol use: Yes     Comment: 1 drink per day    • Drug use: Not on file       No family history on file.     Health Maintenance Due   Topic Date Due   • DXA SCAN  Never done   • COVID-19 Vaccine (1) Never done   • TDAP/TD VACCINES (1 - Tdap) Never done   • ZOSTER VACCINE (1 of 2) Never done   • ANNUAL WELLNESS VISIT  Never done          Current Outpatient Medications:   •  atenolol (TENORMIN) 50 MG tablet, Take 1 tablet by mouth Daily., Disp: 90 tablet, Rfl: 1  •  levothyroxine (SYNTHROID, LEVOTHROID) 50 MCG tablet, Take 1 tablet by mouth Daily., Disp: 90 tablet, Rfl: 1  •  meclizine (ANTIVERT) 12.5 MG tablet, Take 12.5 mg by mouth 2 (Two) Times a Day As Needed., Disp: ,  Rfl:   •  Naproxen Sodium (Aleve) 220 MG capsule, Aleve 220 mg oral capsule take 1-2 capsules by oral route daily   Active, Disp: , Rfl:     Medications Discontinued During This Encounter   Medication Reason   • atenolol (TENORMIN) 50 MG tablet Reorder   • levothyroxine (SYNTHROID, LEVOTHROID) 50 MCG tablet Reorder       Immunization History   Administered Date(s) Administered   • Influenza, Unspecified 11/14/2019   • Pneumococcal Conjugate 13-Valent (PCV13) 02/04/2019   • Pneumococcal Polysaccharide (PPSV23) 11/14/2019       Review of Systems   Constitutional: Positive for fatigue.   Respiratory: Negative for chest tightness and shortness of breath.    Cardiovascular: Negative for leg swelling.        Objective       Vitals:    07/06/21 1036   BP: (!) 190/98   Pulse:    Resp:    Temp:    SpO2:      Body mass index is 28.03 kg/m².     Vitals:    07/06/21 1029 07/06/21 1036   BP: (!) 191/76 (!) 190/98   Pulse: 81    Resp: 24    Temp: 98 °F (36.7 °C)    SpO2: 100%    Weight: 65.1 kg (143 lb 8 oz)          Physical Exam  Vitals reviewed.   Constitutional:       Appearance: Normal appearance. She is well-developed.   HENT:      Mouth/Throat:      Pharynx: No oropharyngeal exudate.   Cardiovascular:      Rate and Rhythm: Normal rate and regular rhythm.      Heart sounds: Normal heart sounds. No murmur heard.     Pulmonary:      Effort: Pulmonary effort is normal.      Breath sounds: Normal breath sounds.   Neurological:      Mental Status: She is alert and oriented to person, place, and time.      Cranial Nerves: No cranial nerve deficit.      Motor: No weakness.   Psychiatric:         Mood and Affect: Mood and affect normal.             Result Review :     The following data was reviewed by: EARLE Lo on 07/06/2021:    Common labs    Common Labsle 7/27/20 10/14/20 1/19/21 1/19/21      1123 1123   Glucose 104 (A) 95  93   BUN 16 24  20   Creatinine 0.76 1.07 (A)  1.01 (A)   Sodium 147 141  141    Potassium 4.8 3.7  4.6   Chloride 108 101  100   Calcium 10.0 10.3  10.2   Albumin 4.0 4.4  4.4   Total Bilirubin 0.25 0.48  0.36   Alkaline Phosphatase 70 75  70   AST (SGOT) 24 19  25   ALT (SGPT) 19 11  20   WBC 4.57 (A) 6.22 7.87    Hemoglobin 12.2 12.5 13.3    Hematocrit 40.1 40.0 42.4    Platelets 180 195 223    Total Cholesterol 155 182     Triglycerides 105 134     HDL Cholesterol 39 (A) 42     LDL Cholesterol  95 113 (A)     (A) Abnormal value       Comments are available for some flowsheets but are not being displayed.                          Assessment and Plan      Diagnoses and all orders for this visit:    1. Allergic rhinitis due to animal hair and dander (Primary)  Assessment & Plan:  She is currently not taking anything for her allergies.  She does have a history with cats that bother her.  Her daughter has cats above.  She does live in the basement of her daughter's house.  We will follow up in 6 months.      2. Essential hypertension  Assessment & Plan:  Patient is only taking her atenolol once a day.  She refuses to take any further medication.  We will do blood work today.  We will adjust med if patient will let us once lab work comes back.  No chest pain or shortness of breath noted.  Follow-up in 6 months.    Orders:  -     atenolol (TENORMIN) 50 MG tablet; Take 1 tablet by mouth Daily.  Dispense: 90 tablet; Refill: 1  -     Comprehensive Metabolic Panel  -     CBC & Differential  -     Lipid Panel  -     Aldosterone    3. Acquired hypothyroidism  Assessment & Plan:  She is currently on Synthroid 50 she does not want to change dose.  She will do blood work today.  Follow-up in 6 months.    Orders:  -     levothyroxine (SYNTHROID, LEVOTHROID) 50 MCG tablet; Take 1 tablet by mouth Daily.  Dispense: 90 tablet; Refill: 1  -     TSH  -     T4, Free    4. Renal insufficiency    5. Generalized anxiety disorder  Assessment & Plan:  She is doing better with her anxiety.  She is currently on no  medication.  No suicidal thoughts or hallucinations.  Follow-up in 6 months    Orders:  -     Aldosterone    6. Arthritis  Assessment & Plan:  She will take her occasional naproxen when needed for pain.  No new symptoms.  Follow-up in 6 months      7. Iron deficiency anemia, unspecified iron deficiency anemia type  Assessment & Plan:  She is tired and request blood work today to check her iron level.  She does have a history of iron deficiency in the past.  Pending labs    Orders:  -     Vitamin B12 & Folate  -     Iron Profile  -     Ferritin          Follow Up     Return in about 6 months (around 1/6/2022).    Patient was given instructions and counseling regarding her condition or for health maintenance advice. Please see specific information pulled into the AVS if appropriate.   Follow-up in 6 months for labs and appt. Call with any concerns or questions that you may have regarding your medications or history.    I have reviewed all medications and at this time no medications changes need to be adjusted for all chronic conditions.    EARLE Lo

## 2021-07-06 NOTE — ASSESSMENT & PLAN NOTE
She is doing better with her anxiety.  She is currently on no medication.  No suicidal thoughts or hallucinations.  Follow-up in 6 months

## 2021-07-06 NOTE — ASSESSMENT & PLAN NOTE
She will take her occasional naproxen when needed for pain.  No new symptoms.  Follow-up in 6 months

## 2021-07-06 NOTE — ASSESSMENT & PLAN NOTE
She is currently on Synthroid 50 she does not want to change dose.  She will do blood work today.  Follow-up in 6 months.

## 2021-07-11 LAB — ALDOST SERPL-MCNC: <1 NG/DL (ref 0–30)

## 2021-07-22 ENCOUNTER — TELEPHONE (OUTPATIENT)
Dept: FAMILY MEDICINE CLINIC | Facility: CLINIC | Age: 86
End: 2021-07-22

## 2021-07-22 NOTE — TELEPHONE ENCOUNTER
Caller: LINDY LOPEZ    Relationship: Emergency Contact    Best call back number: [phone]     What was the call regarding: DAUGHTER STATES SOMEONE FROM THE OFFICE CALLED HAS NO NAME TO CALL BACK. UNABLE TO WARM TRANSFER    Do you require a callback: YES, PLEASE CALL AND ADVISE

## 2021-12-23 ENCOUNTER — OFFICE VISIT (OUTPATIENT)
Dept: FAMILY MEDICINE CLINIC | Facility: CLINIC | Age: 86
End: 2021-12-23

## 2021-12-23 VITALS
SYSTOLIC BLOOD PRESSURE: 158 MMHG | RESPIRATION RATE: 20 BRPM | WEIGHT: 150.2 LBS | HEIGHT: 62 IN | HEART RATE: 92 BPM | TEMPERATURE: 97.6 F | BODY MASS INDEX: 27.64 KG/M2 | DIASTOLIC BLOOD PRESSURE: 82 MMHG

## 2021-12-23 DIAGNOSIS — I10 ESSENTIAL HYPERTENSION: Primary | ICD-10-CM

## 2021-12-23 DIAGNOSIS — G89.29 CHRONIC BILATERAL LOW BACK PAIN WITHOUT SCIATICA: ICD-10-CM

## 2021-12-23 DIAGNOSIS — E03.9 ACQUIRED HYPOTHYROIDISM: ICD-10-CM

## 2021-12-23 DIAGNOSIS — J06.9 UPPER RESPIRATORY TRACT INFECTION, UNSPECIFIED TYPE: ICD-10-CM

## 2021-12-23 DIAGNOSIS — M54.50 CHRONIC BILATERAL LOW BACK PAIN WITHOUT SCIATICA: ICD-10-CM

## 2021-12-23 LAB
ALBUMIN SERPL-MCNC: 4.5 G/DL (ref 3.5–5.2)
ALBUMIN/GLOB SERPL: 1.6 G/DL
ALP SERPL-CCNC: 64 U/L (ref 39–117)
ALT SERPL W P-5'-P-CCNC: 13 U/L (ref 1–33)
ANION GAP SERPL CALCULATED.3IONS-SCNC: 10.4 MMOL/L (ref 5–15)
AST SERPL-CCNC: 20 U/L (ref 1–32)
BASOPHILS # BLD AUTO: 0.02 10*3/MM3 (ref 0–0.2)
BASOPHILS NFR BLD AUTO: 0.4 % (ref 0–1.5)
BILIRUB SERPL-MCNC: 0.4 MG/DL (ref 0–1.2)
BUN SERPL-MCNC: 16 MG/DL (ref 8–23)
BUN/CREAT SERPL: 19.3 (ref 7–25)
CALCIUM SPEC-SCNC: 10.3 MG/DL (ref 8.2–9.6)
CHLORIDE SERPL-SCNC: 101 MMOL/L (ref 98–107)
CHOLEST SERPL-MCNC: 217 MG/DL (ref 0–200)
CO2 SERPL-SCNC: 29.6 MMOL/L (ref 22–29)
CREAT SERPL-MCNC: 0.83 MG/DL (ref 0.57–1)
DEPRECATED RDW RBC AUTO: 42.1 FL (ref 37–54)
EOSINOPHIL # BLD AUTO: 0.11 10*3/MM3 (ref 0–0.4)
EOSINOPHIL NFR BLD AUTO: 1.9 % (ref 0.3–6.2)
ERYTHROCYTE [DISTWIDTH] IN BLOOD BY AUTOMATED COUNT: 12 % (ref 12.3–15.4)
GFR SERPL CREATININE-BSD FRML MDRD: 64 ML/MIN/1.73
GFR SERPL CREATININE-BSD FRML MDRD: 78 ML/MIN/1.73
GLOBULIN UR ELPH-MCNC: 2.9 GM/DL
GLUCOSE SERPL-MCNC: 105 MG/DL (ref 65–99)
HCT VFR BLD AUTO: 42.3 % (ref 34–46.6)
HDLC SERPL-MCNC: 59 MG/DL (ref 40–60)
HGB BLD-MCNC: 13.9 G/DL (ref 12–15.9)
IMM GRANULOCYTES # BLD AUTO: 0.01 10*3/MM3 (ref 0–0.05)
IMM GRANULOCYTES NFR BLD AUTO: 0.2 % (ref 0–0.5)
LDLC SERPL CALC-MCNC: 138 MG/DL (ref 0–100)
LDLC/HDLC SERPL: 2.29 {RATIO}
LYMPHOCYTES # BLD AUTO: 1.91 10*3/MM3 (ref 0.7–3.1)
LYMPHOCYTES NFR BLD AUTO: 33.7 % (ref 19.6–45.3)
MCH RBC QN AUTO: 31.3 PG (ref 26.6–33)
MCHC RBC AUTO-ENTMCNC: 32.9 G/DL (ref 31.5–35.7)
MCV RBC AUTO: 95.3 FL (ref 79–97)
MONOCYTES # BLD AUTO: 0.5 10*3/MM3 (ref 0.1–0.9)
MONOCYTES NFR BLD AUTO: 8.8 % (ref 5–12)
NEUTROPHILS NFR BLD AUTO: 3.12 10*3/MM3 (ref 1.7–7)
NEUTROPHILS NFR BLD AUTO: 55 % (ref 42.7–76)
NRBC BLD AUTO-RTO: 0 /100 WBC (ref 0–0.2)
PLATELET # BLD AUTO: 194 10*3/MM3 (ref 140–450)
PMV BLD AUTO: 11.5 FL (ref 6–12)
POTASSIUM SERPL-SCNC: 4.1 MMOL/L (ref 3.5–5.2)
PROT SERPL-MCNC: 7.4 G/DL (ref 6–8.5)
RBC # BLD AUTO: 4.44 10*6/MM3 (ref 3.77–5.28)
SODIUM SERPL-SCNC: 141 MMOL/L (ref 136–145)
T4 FREE SERPL-MCNC: 1.22 NG/DL (ref 0.93–1.7)
TRIGL SERPL-MCNC: 114 MG/DL (ref 0–150)
TSH SERPL DL<=0.05 MIU/L-ACNC: 2.79 UIU/ML (ref 0.27–4.2)
VLDLC SERPL-MCNC: 20 MG/DL (ref 5–40)
WBC NRBC COR # BLD: 5.67 10*3/MM3 (ref 3.4–10.8)

## 2021-12-23 PROCEDURE — 85025 COMPLETE CBC W/AUTO DIFF WBC: CPT | Performed by: NURSE PRACTITIONER

## 2021-12-23 PROCEDURE — 84439 ASSAY OF FREE THYROXINE: CPT | Performed by: NURSE PRACTITIONER

## 2021-12-23 PROCEDURE — 80061 LIPID PANEL: CPT | Performed by: NURSE PRACTITIONER

## 2021-12-23 PROCEDURE — 84443 ASSAY THYROID STIM HORMONE: CPT | Performed by: NURSE PRACTITIONER

## 2021-12-23 PROCEDURE — 99214 OFFICE O/P EST MOD 30 MIN: CPT | Performed by: NURSE PRACTITIONER

## 2021-12-23 PROCEDURE — 36415 COLL VENOUS BLD VENIPUNCTURE: CPT | Performed by: NURSE PRACTITIONER

## 2021-12-23 PROCEDURE — 80053 COMPREHEN METABOLIC PANEL: CPT | Performed by: NURSE PRACTITIONER

## 2021-12-23 RX ORDER — ATENOLOL 50 MG/1
50 TABLET ORAL DAILY
Qty: 90 TABLET | Refills: 1 | Status: SHIPPED | OUTPATIENT
Start: 2021-12-23 | End: 2022-06-16 | Stop reason: SDUPTHER

## 2021-12-23 RX ORDER — LEVOTHYROXINE SODIUM 0.05 MG/1
50 TABLET ORAL DAILY
Qty: 90 TABLET | Refills: 1 | Status: SHIPPED | OUTPATIENT
Start: 2021-12-23 | End: 2022-06-16 | Stop reason: SDUPTHER

## 2021-12-23 RX ORDER — AZITHROMYCIN 250 MG/1
TABLET, FILM COATED ORAL
Qty: 6 TABLET | Refills: 0 | Status: SHIPPED | OUTPATIENT
Start: 2021-12-23 | End: 2022-06-16

## 2021-12-23 NOTE — PROGRESS NOTES
"Chief Complaint  Follow-up (Anemia, anxiety, hypothyroidism)    Subjective          Loly Terrazas presents to [facility]  History of Present Illness  HTN:  She is taking only the atenolol and will not take anything else.  THYROID:  She is taking her synthroid daily.  She is having sinus issues that started about 2 weeks ago.  She is having a lot of nasal drainage.  No coughing.  No fevers noted.  She just knows it is infection.  She has not been sick in the house.   She is requesting pain med for \"the 2 slipped disk\".  She did try cymbalta and celebrex and that didn't help.        Past Medical History:   • Allergic rhinitis due to allergen   • Anemia   • Anxiety disorder   • Arthritis   • COPD (chronic obstructive pulmonary disease) ([facility])   • DDD (degenerative disc disease), lumbar   • Deafness   • Dyspnea   • Essential hypertension   • Fall   • SEAN (generalized anxiety disorder)   • Gait disturbance   • History of stroke   • Hypothyroidism   • Insomnia   • Joint pain   • Low hemoglobin   • Lumbago   • Lumbar herniated disc    L5-S1   • Memory change   • Renal insufficiency   • Scoliosis   • Sinus node dysfunction ([facility])   • Statin intolerance   • Tremor   • Vein disorder       Allergies  Pneumococcal vac polyvalent    Past Surgical History:   • CHOLECYSTECTOMY   • LEG SURGERY    Fracture with pins   • THYROIDECTOMY       Social History     Tobacco Use   • Smoking status: Former Smoker     Packs/day: 0.25     Years: 15.00     Pack years: 3.75     Types: Cigarettes     Start date:      Quit date:      Years since quittin.0   • Smokeless tobacco: Never Used   Substance Use Topics   • Alcohol use: Yes     Comment: 1 drink per day    • Drug use: Not on file       No family history on file.     Health Maintenance Due   Topic Date Due   • DXA SCAN  Never done   • COVID-19 Vaccine (1) Never done   • TDAP/TD VACCINES (1 - Tdap) Never done   • ZOSTER VACCINE (1 of 2) Never done "   • ANNUAL WELLNESS VISIT  Never done   • INFLUENZA VACCINE  08/01/2021          Current Outpatient Medications:   •  atenolol (TENORMIN) 50 MG tablet, Take 1 tablet by mouth Daily., Disp: 90 tablet, Rfl: 1  •  levothyroxine (SYNTHROID, LEVOTHROID) 50 MCG tablet, Take 1 tablet by mouth Daily., Disp: 90 tablet, Rfl: 1  •  azithromycin (Zithromax Z-Kamran) 250 MG tablet, Take 2 tablets by mouth on day 1, then 1 tablet daily on days 2-5, Disp: 6 tablet, Rfl: 0  •  diclofenac (VOLTAREN) 50 MG EC tablet, Take 1 tablet by mouth 2 (Two) Times a Day As Needed (pain)., Disp: 60 tablet, Rfl: 5    Medications Discontinued During This Encounter   Medication Reason   • meclizine (ANTIVERT) 12.5 MG tablet *Therapy completed   • Naproxen Sodium (Aleve) 220 MG capsule *Therapy completed   • atenolol (TENORMIN) 50 MG tablet Reorder   • levothyroxine (SYNTHROID, LEVOTHROID) 50 MCG tablet Reorder       Immunization History   Administered Date(s) Administered   • Influenza, Unspecified 11/14/2019   • Pneumococcal Conjugate 13-Valent (PCV13) 02/04/2019   • Pneumococcal Polysaccharide (PPSV23) 11/14/2019       Review of Systems   Constitutional: Negative for fatigue.   Respiratory: Negative for cough and shortness of breath.    Cardiovascular: Negative for chest pain.   Gastrointestinal: Negative for diarrhea, nausea and vomiting.        Objective       Vitals:    12/23/21 1022   BP: 158/82   Pulse:    Resp:    Temp:      Body mass index is 27.47 kg/m².         Physical Exam  Vitals reviewed.   Constitutional:       Appearance: Normal appearance. She is well-developed.   HENT:      Right Ear: Tympanic membrane and ear canal normal.      Left Ear: Tympanic membrane and ear canal normal.      Nose: Congestion and rhinorrhea present.      Mouth/Throat:      Pharynx: Posterior oropharyngeal erythema present.   Cardiovascular:      Rate and Rhythm: Normal rate and regular rhythm.      Heart sounds: Normal heart sounds. No murmur  heard.      Pulmonary:      Effort: Pulmonary effort is normal.      Breath sounds: Normal breath sounds.   Neurological:      Mental Status: She is alert and oriented to person, place, and time.      Cranial Nerves: No cranial nerve deficit.      Motor: No weakness.   Psychiatric:         Mood and Affect: Mood and affect normal.             Result Review :     The following data was reviewed by: EARLE Lo on 12/23/2021:    Common labs    Common Labsle 1/19/21 1/19/21 7/6/21 7/6/21 7/6/21    1123 1123 1103 1103 1103   Glucose  93  92    BUN  20  23    Creatinine  1.01 (A)  0.90    eGFR Non  Am    59 (A)    eGFR African Am    71    Sodium  141  143    Potassium  4.6  4.2    Chloride  100  104    Calcium  10.2  10.0 (A)    Albumin  4.4  4.40    Total Bilirubin  0.36  0.3    Alkaline Phosphatase  70  62    AST (SGOT)  25  21    ALT (SGPT)  20  18    WBC 7.87  5.98     Hemoglobin 13.3  13.0     Hematocrit 42.4  39.9     Platelets 223  160     Total Cholesterol     194   Triglycerides     93   HDL Cholesterol     50   LDL Cholesterol      127 (A)   (A) Abnormal value                           Assessment and Plan      Diagnoses and all orders for this visit:    1. Essential hypertension (Primary)  -     Comprehensive Metabolic Panel  -     CBC & Differential  -     TSH  -     Lipid Panel  -     T4, Free  -     atenolol (TENORMIN) 50 MG tablet; Take 1 tablet by mouth Daily.  Dispense: 90 tablet; Refill: 1    2. Acquired hypothyroidism  -     Comprehensive Metabolic Panel  -     CBC & Differential  -     TSH  -     Lipid Panel  -     T4, Free  -     levothyroxine (SYNTHROID, LEVOTHROID) 50 MCG tablet; Take 1 tablet by mouth Daily.  Dispense: 90 tablet; Refill: 1    3. Chronic bilateral low back pain without sciatica  -     diclofenac (VOLTAREN) 50 MG EC tablet; Take 1 tablet by mouth 2 (Two) Times a Day As Needed (pain).  Dispense: 60 tablet; Refill: 5    4. Upper respiratory tract infection,  unspecified type  -     azithromycin (Zithromax Z-Kamran) 250 MG tablet; Take 2 tablets by mouth on day 1, then 1 tablet daily on days 2-5  Dispense: 6 tablet; Refill: 0            Follow Up     Return in about 6 months (around 6/23/2022) for Medicare Wellness4/13/21.  Follow-up in 6 months for labs and appt. Call with any concerns or questions that you may have regarding your medications or history.    I have reviewed all medications and at this time no medications changes need to be adjusted for all chronic conditions.  We will trial the diclofenac for the pain.  She declines pain mgt.    Patient was given instructions and counseling regarding her condition or for health maintenance advice. Please see specific information pulled into the AVS if appropriate.

## 2022-06-16 ENCOUNTER — OFFICE VISIT (OUTPATIENT)
Dept: FAMILY MEDICINE CLINIC | Facility: CLINIC | Age: 87
End: 2022-06-16

## 2022-06-16 VITALS
BODY MASS INDEX: 27.02 KG/M2 | HEART RATE: 90 BPM | HEIGHT: 62 IN | SYSTOLIC BLOOD PRESSURE: 140 MMHG | RESPIRATION RATE: 18 BRPM | WEIGHT: 146.8 LBS | TEMPERATURE: 98 F | DIASTOLIC BLOOD PRESSURE: 80 MMHG | OXYGEN SATURATION: 92 %

## 2022-06-16 DIAGNOSIS — Z00.00 MEDICARE ANNUAL WELLNESS VISIT, SUBSEQUENT: Primary | ICD-10-CM

## 2022-06-16 DIAGNOSIS — I10 ESSENTIAL HYPERTENSION: ICD-10-CM

## 2022-06-16 DIAGNOSIS — R06.02 SHORTNESS OF BREATH: ICD-10-CM

## 2022-06-16 DIAGNOSIS — E03.9 ACQUIRED HYPOTHYROIDISM: ICD-10-CM

## 2022-06-16 LAB
ALBUMIN SERPL-MCNC: 4.9 G/DL (ref 3.5–5.2)
ALBUMIN/GLOB SERPL: 2 G/DL
ALP SERPL-CCNC: 71 U/L (ref 39–117)
ALT SERPL W P-5'-P-CCNC: 22 U/L (ref 1–33)
ANION GAP SERPL CALCULATED.3IONS-SCNC: 16 MMOL/L (ref 5–15)
AST SERPL-CCNC: 23 U/L (ref 1–32)
BASOPHILS # BLD AUTO: 0.05 10*3/MM3 (ref 0–0.2)
BASOPHILS NFR BLD AUTO: 0.8 % (ref 0–1.5)
BILIRUB SERPL-MCNC: 0.5 MG/DL (ref 0–1.2)
BUN SERPL-MCNC: 18 MG/DL (ref 8–23)
BUN/CREAT SERPL: 18.9 (ref 7–25)
CALCIUM SPEC-SCNC: 10.3 MG/DL (ref 8.2–9.6)
CHLORIDE SERPL-SCNC: 98 MMOL/L (ref 98–107)
CHOLEST SERPL-MCNC: 198 MG/DL (ref 0–200)
CO2 SERPL-SCNC: 29 MMOL/L (ref 22–29)
CREAT SERPL-MCNC: 0.95 MG/DL (ref 0.57–1)
DEPRECATED RDW RBC AUTO: 42.5 FL (ref 37–54)
EGFRCR SERPLBLD CKD-EPI 2021: 56 ML/MIN/1.73
EOSINOPHIL # BLD AUTO: 0.21 10*3/MM3 (ref 0–0.4)
EOSINOPHIL NFR BLD AUTO: 3.3 % (ref 0.3–6.2)
ERYTHROCYTE [DISTWIDTH] IN BLOOD BY AUTOMATED COUNT: 12.1 % (ref 12.3–15.4)
GLOBULIN UR ELPH-MCNC: 2.5 GM/DL
GLUCOSE SERPL-MCNC: 100 MG/DL (ref 65–99)
HCT VFR BLD AUTO: 43.9 % (ref 34–46.6)
HDLC SERPL-MCNC: 45 MG/DL (ref 40–60)
HGB BLD-MCNC: 14.2 G/DL (ref 12–15.9)
IMM GRANULOCYTES # BLD AUTO: 0.01 10*3/MM3 (ref 0–0.05)
IMM GRANULOCYTES NFR BLD AUTO: 0.2 % (ref 0–0.5)
LDLC SERPL CALC-MCNC: 122 MG/DL (ref 0–100)
LDLC/HDLC SERPL: 2.62 {RATIO}
LYMPHOCYTES # BLD AUTO: 2.25 10*3/MM3 (ref 0.7–3.1)
LYMPHOCYTES NFR BLD AUTO: 35.3 % (ref 19.6–45.3)
MCH RBC QN AUTO: 30.9 PG (ref 26.6–33)
MCHC RBC AUTO-ENTMCNC: 32.3 G/DL (ref 31.5–35.7)
MCV RBC AUTO: 95.6 FL (ref 79–97)
MONOCYTES # BLD AUTO: 0.58 10*3/MM3 (ref 0.1–0.9)
MONOCYTES NFR BLD AUTO: 9.1 % (ref 5–12)
NEUTROPHILS NFR BLD AUTO: 3.28 10*3/MM3 (ref 1.7–7)
NEUTROPHILS NFR BLD AUTO: 51.3 % (ref 42.7–76)
NRBC BLD AUTO-RTO: 0 /100 WBC (ref 0–0.2)
NT-PROBNP SERPL-MCNC: 419 PG/ML (ref 0–1800)
PLATELET # BLD AUTO: 197 10*3/MM3 (ref 140–450)
PMV BLD AUTO: 12.1 FL (ref 6–12)
POTASSIUM SERPL-SCNC: 4.6 MMOL/L (ref 3.5–5.2)
PROT SERPL-MCNC: 7.4 G/DL (ref 6–8.5)
RBC # BLD AUTO: 4.59 10*6/MM3 (ref 3.77–5.28)
SODIUM SERPL-SCNC: 143 MMOL/L (ref 136–145)
TRIGL SERPL-MCNC: 175 MG/DL (ref 0–150)
TSH SERPL DL<=0.05 MIU/L-ACNC: 3.51 UIU/ML (ref 0.27–4.2)
VLDLC SERPL-MCNC: 31 MG/DL (ref 5–40)
WBC NRBC COR # BLD: 6.38 10*3/MM3 (ref 3.4–10.8)

## 2022-06-16 PROCEDURE — 83880 ASSAY OF NATRIURETIC PEPTIDE: CPT | Performed by: NURSE PRACTITIONER

## 2022-06-16 PROCEDURE — G0439 PPPS, SUBSEQ VISIT: HCPCS | Performed by: NURSE PRACTITIONER

## 2022-06-16 PROCEDURE — 99213 OFFICE O/P EST LOW 20 MIN: CPT | Performed by: NURSE PRACTITIONER

## 2022-06-16 PROCEDURE — 36415 COLL VENOUS BLD VENIPUNCTURE: CPT | Performed by: NURSE PRACTITIONER

## 2022-06-16 PROCEDURE — 80061 LIPID PANEL: CPT | Performed by: NURSE PRACTITIONER

## 2022-06-16 PROCEDURE — 85025 COMPLETE CBC W/AUTO DIFF WBC: CPT | Performed by: NURSE PRACTITIONER

## 2022-06-16 PROCEDURE — 1159F MED LIST DOCD IN RCRD: CPT | Performed by: NURSE PRACTITIONER

## 2022-06-16 PROCEDURE — 96160 PT-FOCUSED HLTH RISK ASSMT: CPT | Performed by: NURSE PRACTITIONER

## 2022-06-16 PROCEDURE — 80053 COMPREHEN METABOLIC PANEL: CPT | Performed by: NURSE PRACTITIONER

## 2022-06-16 PROCEDURE — 84443 ASSAY THYROID STIM HORMONE: CPT | Performed by: NURSE PRACTITIONER

## 2022-06-16 PROCEDURE — 1170F FXNL STATUS ASSESSED: CPT | Performed by: NURSE PRACTITIONER

## 2022-06-16 RX ORDER — LEVOTHYROXINE SODIUM 0.05 MG/1
50 TABLET ORAL DAILY
Qty: 90 TABLET | Refills: 1 | Status: SHIPPED | OUTPATIENT
Start: 2022-06-16 | End: 2022-12-08 | Stop reason: SDUPTHER

## 2022-06-16 RX ORDER — LEVOTHYROXINE SODIUM 0.05 MG/1
TABLET ORAL
Qty: 90 TABLET | Refills: 1 | OUTPATIENT
Start: 2022-06-16

## 2022-06-16 RX ORDER — ATENOLOL 50 MG/1
TABLET ORAL
Qty: 90 TABLET | Refills: 1 | OUTPATIENT
Start: 2022-06-16

## 2022-06-16 RX ORDER — ATENOLOL 50 MG/1
50 TABLET ORAL DAILY
Qty: 90 TABLET | Refills: 1 | Status: SHIPPED | OUTPATIENT
Start: 2022-06-16 | End: 2022-12-08 | Stop reason: SDUPTHER

## 2022-06-16 NOTE — PROGRESS NOTES
The ABCs of the Annual Wellness Visit  Subsequent Medicare Wellness Visit    Chief Complaint   Patient presents with   • Medicare Wellness-subsequent   • Hypertension   • Hypothyroidism   • Shortness of Breath     Requesting an order form home 02      Subjective    History of Present Illness:  Loly Terrazas is a 89 y.o. female who presents for a Subsequent Medicare Wellness Visit.    The following portions of the patient's history were reviewed and   updated as appropriate: allergies, current medications, past family history, past medical history, past social history, past surgical history and problem list.    Compared to one year ago, the patient feels her physical   health is the same.    Compared to one year ago, the patient feels her mental   health is the same.    Recent Hospitalizations:  She was not admitted to the hospital during the last year.       Current Medical Providers:  Patient Care Team:  Keisha Sullivan APRN as PCP - General (Nurse Practitioner)    Outpatient Medications Prior to Visit   Medication Sig Dispense Refill   • atenolol (TENORMIN) 50 MG tablet Take 1 tablet by mouth Daily. 90 tablet 1   • azithromycin (Zithromax Z-Kamran) 250 MG tablet Take 2 tablets by mouth on day 1, then 1 tablet daily on days 2-5 6 tablet 0   • diclofenac (VOLTAREN) 50 MG EC tablet Take 1 tablet by mouth 2 (Two) Times a Day As Needed (pain). 60 tablet 5   • levothyroxine (SYNTHROID, LEVOTHROID) 50 MCG tablet Take 1 tablet by mouth Daily. 90 tablet 1     No facility-administered medications prior to visit.       No opioid medication identified on active medication list. I have reviewed chart for other potential  high risk medication/s and harmful drug interactions in the elderly.          Aspirin is not on active medication list.  Aspirin use is not indicated based on review of current medical condition/s. Risk of harm outweighs potential benefits.  .    Patient Active Problem List   Diagnosis   • Allergic  "rhinitis due to allergen   • Anemia   • Arthritis   • COPD (chronic obstructive pulmonary disease) ([facility])   • DDD (degenerative disc disease), lumbar   • Deafness   • Essential hypertension   • Gait disturbance   • Generalized anxiety disorder   • Herniation of lumbar intervertebral disc without myelopathy   • History of stroke   • Hypothyroidism   • Insomnia, unspecified   • Memory change   • Renal insufficiency   • Scoliosis   • Statin intolerance     Advance Care Planning  Advance Directive is on file.  ACP discussion was declined by the patient. Patient has an advance directive in EMR which is still valid.           Objective    Vitals:    06/16/22 1040   BP: 140/80   Pulse: 90   Resp: 18   Temp: 98 °F (36.7 °C)   SpO2: 92%   Weight: 66.6 kg (146 lb 12.8 oz)   Height: 157.5 cm (62\")     Estimated body mass index is 26.85 kg/m² as calculated from the following:    Height as of this encounter: 157.5 cm (62\").    Weight as of this encounter: 66.6 kg (146 lb 12.8 oz).    BMI is >= 25 and <30. (Overweight) The following options were offered after discussion;: nutrition counseling/recommendations      Does the patient have evidence of cognitive impairment? No  She does have mild memory issues at times per pt    Physical Exam  Vitals reviewed.   Constitutional:       Appearance: Normal appearance. She is well-developed.   Cardiovascular:      Rate and Rhythm: Normal rate and regular rhythm.      Heart sounds: Normal heart sounds. No murmur heard.  Pulmonary:      Effort: Pulmonary effort is normal.      Breath sounds: Normal breath sounds.   Neurological:      Mental Status: She is alert and oriented to person, place, and time.      Cranial Nerves: No cranial nerve deficit.      Motor: No weakness.   Psychiatric:         Mood and Affect: Mood and affect normal.     I did review all of the [facility]              HEALTH RISK ASSESSMENT    Smoking Status:  Social History     Tobacco Use   Smoking Status Former Smoker   • " Packs/day: 0.25   • Years: 15.00   • Pack years: 3.75   • Types: Cigarettes   • Start date:    • Quit date:    • Years since quittin.4   Smokeless Tobacco Never Used     Alcohol Consumption:  Social History     Substance and Sexual Activity   Alcohol Use Yes    Comment: 1 drink per day      Fall Risk Screen:    GUILLERMO Fall Risk Assessment was completed, and patient is at HIGH risk for falls. Assessment completed on:2022    Depression Screening:  PHQ-2/PHQ-9 Depression Screening 2022   Retired PHQ-9 Total Score -   Retired Total Score -   Little Interest or Pleasure in Doing Things 0-->not at all   Feeling Down, Depressed or Hopeless 0-->not at all   PHQ-9: Brief Depression Severity Measure Score 0       Health Habits and Functional and Cognitive Screening:  Functional & Cognitive Status 2022   Do you have difficulty preparing food and eating? Yes   Do you have difficulty bathing yourself, getting dressed or grooming yourself? No   Do you have difficulty using the toilet? No   Do you have difficulty moving around from place to place? Yes   Do you have trouble with steps or getting out of a bed or a chair? No   Current Diet Well Balanced Diet   Dental Exam Not up to date   Eye Exam Not up to date   Exercise (times per week) 0 times per week   Current Exercises Include No Regular Exercise   Do you need help using the phone?  No   Are you deaf or do you have serious difficulty hearing?  Yes   Do you need help with transportation? Yes   Do you need help shopping? Yes   Do you need help preparing meals?  Yes   Do you need help with housework?  Yes   Do you need help with laundry? Yes   Do you need help taking your medications? Yes   Do you need help managing money? Yes   Do you ever drive or ride in a car without wearing a seat belt? No   Have you felt unusual stress, anger or loneliness in the last month? Yes   Who do you live with? Child   If you need help, do you have trouble finding someone  available to you? No   Have you been bothered in the last four weeks by sexual problems? No   Do you have difficulty concentrating, remembering or making decisions? No       Age-appropriate Screening Schedule:  Refer to the list below for future screening recommendations based on patient's age, sex and/or medical conditions. Orders for these recommended tests are listed in the plan section. The patient has been provided with a written plan.    Health Maintenance   Topic Date Due   • DXA SCAN  06/16/2022 (Originally 6/10/1929)   • TDAP/TD VACCINES (1 - Tdap) 06/13/2023 (Originally 6/10/1948)   • ZOSTER VACCINE (1 of 2) 06/16/2023 (Originally 6/10/1979)   • INFLUENZA VACCINE  10/01/2022              Assessment & Plan   CMS Preventative Services Quick Reference  Risk Factors Identified During Encounter  Cardiovascular Disease  Dementia/Memory   Depression/Dysphoria  Fall Risk-High or Moderate  Inactivity/Sedentary  The above risks/problems have been discussed with the patient.  Follow up actions/plans if indicated are seen below in the Assessment/Plan Section.  Pertinent information has been shared with the patient in the After Visit Summary.    Diagnoses and all orders for this visit:    1. Medicare annual wellness visit, subsequent (Primary)    2. Acquired hypothyroidism  -     levothyroxine (SYNTHROID, LEVOTHROID) 50 MCG tablet; Take 1 tablet by mouth Daily.  Dispense: 90 tablet; Refill: 1  -     TSH    3. Essential hypertension  -     atenolol (TENORMIN) 50 MG tablet; Take 1 tablet by mouth Daily.  Dispense: 90 tablet; Refill: 1  -     Comprehensive Metabolic Panel  -     CBC & Differential  -     TSH  -     Lipid Panel    4. Shortness of breath  -     BNP ([facility] Only)        Follow Up:   Return in about 6 months (around 12/16/2022).     An After Visit Summary and PPPS were made available to the patient.                   Chief Complaint  Medicare Wellness-subsequent, Hypertension, Hypothyroidism, and Shortness  of Breath (Requesting an order form home 02)    Subjective    History of Present Illness    Loly Terrazas presents for Annual Wellness Visit as well as for follow-up on chronic medical problems including hypertension.   She is having increased shortness of breath.  She is requesting oxygen though her oxygen levels are normal.  Result Review :     Common labs    Common Labsle 7/6/21 7/6/21 7/6/21 12/23/21 12/23/21 12/23/21    1103 1103 1103 1051 1051 1051   Glucose  92    105 (A)   BUN  23    16   Creatinine  0.90    0.83   eGFR Non  Am  59 (A)    64   eGFR  Am  71    78   Sodium  143    141   Potassium  4.2    4.1   Chloride  104    101   Calcium  10.0 (A)    10.3 (A)   Albumin  4.40    4.50   Total Bilirubin  0.3    0.4   Alkaline Phosphatase  62    64   AST (SGOT)  21    20   ALT (SGPT)  18    13   WBC 5.98   5.67     Hemoglobin 13.0   13.9     Hematocrit 39.9   42.3     Platelets 160   194     Total Cholesterol   194  217 (A)    Triglycerides   93  114    HDL Cholesterol   50  59    LDL Cholesterol    127 (A)  138 (A)    (A) Abnormal value                   We discussed about O2 and her increased SOA.  She declines to do further testing on the heart like an echo or cxr.     Keisha Sullivan, APRN  06/16/2022     Glabellar Complex Units: 20

## 2022-12-08 ENCOUNTER — OFFICE VISIT (OUTPATIENT)
Dept: FAMILY MEDICINE CLINIC | Facility: CLINIC | Age: 87
End: 2022-12-08

## 2022-12-08 VITALS
BODY MASS INDEX: 27.27 KG/M2 | SYSTOLIC BLOOD PRESSURE: 160 MMHG | DIASTOLIC BLOOD PRESSURE: 76 MMHG | TEMPERATURE: 96.8 F | HEART RATE: 58 BPM | OXYGEN SATURATION: 98 % | HEIGHT: 62 IN | RESPIRATION RATE: 12 BRPM | WEIGHT: 148.2 LBS

## 2022-12-08 DIAGNOSIS — M79.89 LEG SWELLING: ICD-10-CM

## 2022-12-08 DIAGNOSIS — R42 DIZZINESS: Primary | ICD-10-CM

## 2022-12-08 DIAGNOSIS — R79.9 ABNORMAL FINDING OF BLOOD CHEMISTRY, UNSPECIFIED: ICD-10-CM

## 2022-12-08 DIAGNOSIS — E03.9 ACQUIRED HYPOTHYROIDISM: ICD-10-CM

## 2022-12-08 DIAGNOSIS — R68.89 COLD SENSITIVITY: ICD-10-CM

## 2022-12-08 DIAGNOSIS — I10 ESSENTIAL HYPERTENSION: ICD-10-CM

## 2022-12-08 PROCEDURE — 80053 COMPREHEN METABOLIC PANEL: CPT | Performed by: NURSE PRACTITIONER

## 2022-12-08 PROCEDURE — 36415 COLL VENOUS BLD VENIPUNCTURE: CPT | Performed by: NURSE PRACTITIONER

## 2022-12-08 PROCEDURE — 85025 COMPLETE CBC W/AUTO DIFF WBC: CPT | Performed by: NURSE PRACTITIONER

## 2022-12-08 PROCEDURE — 83540 ASSAY OF IRON: CPT | Performed by: NURSE PRACTITIONER

## 2022-12-08 PROCEDURE — 84439 ASSAY OF FREE THYROXINE: CPT | Performed by: NURSE PRACTITIONER

## 2022-12-08 PROCEDURE — 84466 ASSAY OF TRANSFERRIN: CPT | Performed by: NURSE PRACTITIONER

## 2022-12-08 PROCEDURE — 84443 ASSAY THYROID STIM HORMONE: CPT | Performed by: NURSE PRACTITIONER

## 2022-12-08 PROCEDURE — 80061 LIPID PANEL: CPT | Performed by: NURSE PRACTITIONER

## 2022-12-08 PROCEDURE — 99214 OFFICE O/P EST MOD 30 MIN: CPT | Performed by: NURSE PRACTITIONER

## 2022-12-08 PROCEDURE — 82728 ASSAY OF FERRITIN: CPT | Performed by: NURSE PRACTITIONER

## 2022-12-08 RX ORDER — HYDROCHLOROTHIAZIDE 25 MG/1
25 TABLET ORAL DAILY
Qty: 90 TABLET | Refills: 1 | Status: SHIPPED | OUTPATIENT
Start: 2022-12-08

## 2022-12-08 RX ORDER — LEVOTHYROXINE SODIUM 0.05 MG/1
50 TABLET ORAL DAILY
Qty: 90 TABLET | Refills: 1 | Status: SHIPPED | OUTPATIENT
Start: 2022-12-08

## 2022-12-08 RX ORDER — ATENOLOL 50 MG/1
50 TABLET ORAL DAILY
Qty: 90 TABLET | Refills: 1 | Status: SHIPPED | OUTPATIENT
Start: 2022-12-08

## 2022-12-08 RX ORDER — MECLIZINE HYDROCHLORIDE 25 MG/1
25 TABLET ORAL 3 TIMES DAILY PRN
Qty: 45 TABLET | Refills: 1 | Status: SHIPPED | OUTPATIENT
Start: 2022-12-08

## 2022-12-08 NOTE — PROGRESS NOTES
"Chief Complaint  Hypothyroidism and Hypertension (6 month follow up)    Subjective          Loly Terrazas presents to [facility]  History of Present Illness  HTN: She is taking her atenolol but she will not take any further meds.  She has not had her meds.  She is having some dizziness at times \"not very often\".  \"I still am not walking good\".  She has fallen off the landing about 3 weeks ago--it is down 1 step on the floor.  Her son in law saw it happening but couldn't get there in time.  She does \"almost/stubbled per her daughter\".    THYROID:  She is taking the synthroid.    Her feet and legs are swelling to where her bruising on her feet.  She will get strange bruising.  She has not been taking the HCTZ.      Wheezing when the cat is near.  Ok if not around cat per pt.      Allergies  Pneumococcal vac polyvalent    Social History     Tobacco Use   • Smoking status: Former     Packs/day: 0.25     Years: 15.00     Pack years: 3.75     Types: Cigarettes     Start date:      Quit date:      Years since quittin.9   • Smokeless tobacco: Never   Substance Use Topics   • Alcohol use: Yes     Comment: 1 drink per day        No family history on file.     Health Maintenance Due   Topic Date Due   • DXA SCAN  Never done   • COVID-19 Vaccine (1) Never done   • INFLUENZA VACCINE  2022        Immunization History   Administered Date(s) Administered   • Influenza, Unspecified 2019   • Pneumococcal Conjugate 13-Valent (PCV13) 2019   • Pneumococcal Polysaccharide (PPSV23) 2019       Review of Systems   Constitutional: Positive for fatigue.   Respiratory: Positive for cough. Negative for shortness of breath.    Cardiovascular: Positive for leg swelling. Negative for chest pain.   Gastrointestinal: Negative for diarrhea, nausea and vomiting.   Skin: Positive for bruise.        Objective       Vitals:    22 1022   BP: 160/76   BP Location: Right arm " "  Patient Position: Sitting   Cuff Size: Adult   Pulse: 58   Resp: 12   Temp: 96.8 °F (36 °C)   SpO2: 98%   Weight: 67.2 kg (148 lb 3.2 oz)   Height: 157.5 cm (62\")       Body mass index is 27.11 kg/m².         Physical Exam  Vitals reviewed.   Constitutional:       Appearance: Normal appearance. She is well-developed.   Cardiovascular:      Rate and Rhythm: Normal rate and regular rhythm.   Pulmonary:      Effort: Pulmonary effort is normal.      Breath sounds: No wheezing or rhonchi.   Musculoskeletal:      Right lower leg: Edema present.      Left lower leg: Edema present.   Neurological:      Mental Status: She is alert and oriented to person, place, and time.      Cranial Nerves: No cranial nerve deficit.      Motor: No weakness.   Psychiatric:         Mood and Affect: Mood and affect normal.             Result Review :     The following data was reviewed by: EARLE Lo on 12/08/2022:    Common labs    Common Labs 12/23/21 12/23/21 12/23/21 6/16/22 6/16/22 6/16/22    1051 1051 1051 1107 1107 1107   Glucose   105 (A)  100 (A)    BUN   16  18    Creatinine   0.83  0.95    eGFR Non  Am   64      eGFR African Am   78      Sodium   141  143    Potassium   4.1  4.6    Chloride   101  98    Calcium   10.3 (A)  10.3 (A)    Albumin   4.50  4.90    Total Bilirubin   0.4  0.5    Alkaline Phosphatase   64  71    AST (SGOT)   20  23    ALT (SGPT)   13  22    WBC 5.67   6.38     Hemoglobin 13.9   14.2     Hematocrit 42.3   43.9     Platelets 194   197     Total Cholesterol  217 (A)    198   Triglycerides  114    175 (A)   HDL Cholesterol  59    45   LDL Cholesterol   138 (A)    122 (A)   (A) Abnormal value                               Assessment and Plan      Diagnoses and all orders for this visit:    1. Dizziness (Primary)  -     meclizine (ANTIVERT) 25 MG tablet; Take 1 tablet by mouth 3 (Three) Times a Day As Needed for Dizziness.  Dispense: 45 tablet; Refill: 1  -     Iron Profile  -     " Ferritin    2. Leg swelling  -     hydroCHLOROthiazide (HYDRODIURIL) 25 MG tablet; Take 1 tablet by mouth Daily.  Dispense: 90 tablet; Refill: 1    3. Essential hypertension  -     atenolol (TENORMIN) 50 MG tablet; Take 1 tablet by mouth Daily.  Dispense: 90 tablet; Refill: 1  -     Comprehensive Metabolic Panel  -     CBC & Differential  -     Lipid Panel    4. Acquired hypothyroidism  -     levothyroxine (SYNTHROID, LEVOTHROID) 50 MCG tablet; Take 1 tablet by mouth Daily.  Dispense: 90 tablet; Refill: 1  -     TSH  -     T4, Free    5. Cold sensitivity  -     Iron Profile  -     Ferritin    6. Abnormal finding of blood chemistry, unspecified  -     Iron Profile  -     Ferritin            Follow Up     Return in about 6 months (around 6/8/2023) for Medicare Wellness6/16/22.  Follow-up in 6 months for labs and appt. Call with any concerns or questions that you may have regarding your medications or history.    We will check labs for iron.  She needs to take the water pill daily for the swelling.  Patient was given instructions and counseling regarding her condition or for health maintenance advice. Please see specific information pulled into the AVS if appropriate.         Keisha Sullivan, APRN  12/08/2022

## 2022-12-09 LAB
ALBUMIN SERPL-MCNC: 4 G/DL (ref 3.5–5.2)
ALBUMIN/GLOB SERPL: 1.3 G/DL
ALP SERPL-CCNC: 69 U/L (ref 39–117)
ALT SERPL W P-5'-P-CCNC: 12 U/L (ref 1–33)
ANION GAP SERPL CALCULATED.3IONS-SCNC: 9.7 MMOL/L (ref 5–15)
AST SERPL-CCNC: 20 U/L (ref 1–32)
BASOPHILS # BLD AUTO: 0.02 10*3/MM3 (ref 0–0.2)
BASOPHILS NFR BLD AUTO: 0.3 % (ref 0–1.5)
BILIRUB SERPL-MCNC: 0.2 MG/DL (ref 0–1.2)
BUN SERPL-MCNC: 18 MG/DL (ref 8–23)
BUN/CREAT SERPL: 24.3 (ref 7–25)
CALCIUM SPEC-SCNC: 9.5 MG/DL (ref 8.2–9.6)
CHLORIDE SERPL-SCNC: 105 MMOL/L (ref 98–107)
CHOLEST SERPL-MCNC: 167 MG/DL (ref 0–200)
CO2 SERPL-SCNC: 29.3 MMOL/L (ref 22–29)
CREAT SERPL-MCNC: 0.74 MG/DL (ref 0.57–1)
DEPRECATED RDW RBC AUTO: 41.6 FL (ref 37–54)
EGFRCR SERPLBLD CKD-EPI 2021: 75.5 ML/MIN/1.73
EOSINOPHIL # BLD AUTO: 0.15 10*3/MM3 (ref 0–0.4)
EOSINOPHIL NFR BLD AUTO: 2.5 % (ref 0.3–6.2)
ERYTHROCYTE [DISTWIDTH] IN BLOOD BY AUTOMATED COUNT: 12 % (ref 12.3–15.4)
FERRITIN SERPL-MCNC: 27.7 NG/ML (ref 13–150)
GLOBULIN UR ELPH-MCNC: 3 GM/DL
GLUCOSE SERPL-MCNC: 85 MG/DL (ref 65–99)
HCT VFR BLD AUTO: 34.1 % (ref 34–46.6)
HDLC SERPL-MCNC: 44 MG/DL (ref 40–60)
HGB BLD-MCNC: 11.1 G/DL (ref 12–15.9)
IMM GRANULOCYTES # BLD AUTO: 0.01 10*3/MM3 (ref 0–0.05)
IMM GRANULOCYTES NFR BLD AUTO: 0.2 % (ref 0–0.5)
IRON 24H UR-MRATE: 46 MCG/DL (ref 37–145)
IRON SATN MFR SERPL: 11 % (ref 20–50)
LDLC SERPL CALC-MCNC: 103 MG/DL (ref 0–100)
LDLC/HDLC SERPL: 2.29 {RATIO}
LYMPHOCYTES # BLD AUTO: 1.86 10*3/MM3 (ref 0.7–3.1)
LYMPHOCYTES NFR BLD AUTO: 31 % (ref 19.6–45.3)
MCH RBC QN AUTO: 31.3 PG (ref 26.6–33)
MCHC RBC AUTO-ENTMCNC: 32.6 G/DL (ref 31.5–35.7)
MCV RBC AUTO: 96.1 FL (ref 79–97)
MONOCYTES # BLD AUTO: 0.6 10*3/MM3 (ref 0.1–0.9)
MONOCYTES NFR BLD AUTO: 10 % (ref 5–12)
NEUTROPHILS NFR BLD AUTO: 3.36 10*3/MM3 (ref 1.7–7)
NEUTROPHILS NFR BLD AUTO: 56 % (ref 42.7–76)
NRBC BLD AUTO-RTO: 0 /100 WBC (ref 0–0.2)
PLATELET # BLD AUTO: 187 10*3/MM3 (ref 140–450)
PMV BLD AUTO: 11.7 FL (ref 6–12)
POTASSIUM SERPL-SCNC: 5 MMOL/L (ref 3.5–5.2)
PROT SERPL-MCNC: 7 G/DL (ref 6–8.5)
RBC # BLD AUTO: 3.55 10*6/MM3 (ref 3.77–5.28)
SODIUM SERPL-SCNC: 144 MMOL/L (ref 136–145)
T4 FREE SERPL-MCNC: 0.92 NG/DL (ref 0.93–1.7)
TIBC SERPL-MCNC: 437 MCG/DL (ref 298–536)
TRANSFERRIN SERPL-MCNC: 293 MG/DL (ref 200–360)
TRIGL SERPL-MCNC: 111 MG/DL (ref 0–150)
TSH SERPL DL<=0.05 MIU/L-ACNC: 5.16 UIU/ML (ref 0.27–4.2)
VLDLC SERPL-MCNC: 20 MG/DL (ref 5–40)
WBC NRBC COR # BLD: 6 10*3/MM3 (ref 3.4–10.8)

## 2023-05-31 NOTE — PROGRESS NOTES
The ABCs of the Annual Wellness Visit  Subsequent Medicare Wellness Visit    Subjective    Loly Terrazas is a 89 y.o. female who presents for a Subsequent Medicare Wellness Visit.    The following portions of the patient's history were reviewed and   updated as appropriate: allergies, current medications, past family history, past medical history, past social history, past surgical history and problem list.    Compared to one year ago, the patient feels her physical   health is the same.    Compared to one year ago, the patient feels her mental   health is the same.    Recent Hospitalizations:  She was not admitted to the hospital during the last year.       Current Medical Providers:  Patient Care Team:  Keisha Sullivan APRN as PCP - General (Nurse Practitioner)    Outpatient Medications Prior to Visit   Medication Sig Dispense Refill   • ibuprofen (ADVIL,MOTRIN) 200 MG tablet Take 1 tablet by mouth Every 6 (Six) Hours As Needed for Mild Pain.     • multivitamin with minerals (MULTIVITAMIN ADULT PO) Take 1 tablet by mouth Daily.     • atenolol (TENORMIN) 50 MG tablet Take 1 tablet by mouth Daily. 90 tablet 1   • hydroCHLOROthiazide (HYDRODIURIL) 25 MG tablet Take 1 tablet by mouth Daily. 90 tablet 1   • levothyroxine (SYNTHROID, LEVOTHROID) 50 MCG tablet Take 1 tablet by mouth Daily. 90 tablet 1   • meclizine (ANTIVERT) 25 MG tablet Take 1 tablet by mouth 3 (Three) Times a Day As Needed for Dizziness. 45 tablet 1     No facility-administered medications prior to visit.       No opioid medication identified on active medication list. I have reviewed chart for other potential  high risk medication/s and harmful drug interactions in the elderly.          Aspirin is not on active medication list.  Aspirin use is not indicated based on review of current medical condition/s. Risk of harm outweighs potential benefits.  .    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  ACP discussion was declined by the patient. Patient has an advance directive in EMR which is still valid.      Objective    Vitals:    23 1026   BP: 120/80   Pulse: 66   Resp: 18   Temp: 98.1 °F (36.7 °C)   SpO2: 94%   Weight: 69.3 kg (152 lb 11.2 oz)   Height: 157.5 cm (62\")     Estimated body mass index is 27.93 kg/m² as calculated from the following:    Height as of this encounter: 157.5 cm (62\").    Weight as of this encounter: 69.3 kg (152 lb 11.2 oz).    BMI is >= 25 and <30. (Overweight) The following options were offered after discussion;: exercise counseling/recommendations and nutrition counseling/recommendations      Does the patient have evidence of cognitive impairment? Yes but pt lives with her daughter          HEALTH RISK ASSESSMENT    Smoking Status:  Social History     Tobacco Use   Smoking Status Former   • Packs/day: 0.25   • Years: 15.00   • Pack years: 3.75   • Types: Cigarettes   • Start date:    • Quit date:    • Years since quittin.4   Smokeless Tobacco Never     Alcohol Consumption:  Social History     Substance and Sexual Activity   Alcohol Use Yes    Comment: 1 drink per day      Fall Risk Screen:    GUILLERMO Fall Risk Assessment was completed, and patient is at HIGH risk for falls. Assessment completed on:2023    Depression Screenin/1/2023    10:35 AM   PHQ-2/PHQ-9 Depression Screening   Little Interest or Pleasure in Doing Things 0-->not at all   Feeling Down, Depressed or " Hopeless 0-->not at all   PHQ-9: Brief Depression Severity Measure Score 0       Health Habits and Functional and Cognitive Screenin/1/2023    10:30 AM   Functional & Cognitive Status   Do you have difficulty preparing food and eating? No   Do you have difficulty bathing yourself, getting dressed or grooming yourself? No   Do you have difficulty using the toilet? No   Do you have difficulty moving around from place to place? No   Do you have trouble with steps or getting out of a bed or a chair? Yes   Current Diet Well Balanced Diet   Dental Exam Not up to date        Dental Exam Comment dentures   Eye Exam Not up to date   Exercise (times per week) 2 times per week   Current Exercises Include Gardening   Do you need help using the phone?  No   Are you deaf or do you have serious difficulty hearing?  Yes   Do you need help with transportation? Yes   Do you need help shopping? Yes   Do you need help preparing meals?  No   Do you need help with housework?  No   Do you need help with laundry? Yes   Do you need help taking your medications? No   Do you need help managing money? No   Do you ever drive or ride in a car without wearing a seat belt? No   Have you felt unusual stress, anger or loneliness in the last month? No   Who do you live with? Child   If you need help, do you have trouble finding someone available to you? No   Have you been bothered in the last four weeks by sexual problems? No   Do you have difficulty concentrating, remembering or making decisions? Yes       Age-appropriate Screening Schedule:  Refer to the list below for future screening recommendations based on patient's age, sex and/or medical conditions. Orders for these recommended tests are listed in the plan section. The patient has been provided with a written plan.    Health Maintenance   Topic Date Due   • COVID-19 Vaccine (1) Never done   • DXA SCAN  2023 (Originally 6/10/1929)   • TDAP/TD VACCINES (1 - Tdap) 2023  "(Originally 6/10/1948)   • ZOSTER VACCINE (1 of 2) 06/16/2023 (Originally 6/10/1979)   • INFLUENZA VACCINE  08/01/2023   • ANNUAL WELLNESS VISIT  06/01/2024   • Pneumococcal Vaccine 65+  Completed                  CMS Preventative Services Quick Reference  Risk Factors Identified During Encounter  Fall Risk-High or Moderate: Discussed Fall Prevention in the home  Hearing Problem: declines  Immunizations Discussed/Encouraged: Influenza, Prevnar 20 (Pneumococcal 20-valent conjugate), Shingrix and COVID19  Dental Screening Recommended  Vision Screening Recommended  The above risks/problems have been discussed with the patient.  Pertinent information has been shared with the patient in the After Visit Summary.  An After Visit Summary and PPPS were made available to the patient.    Follow Up:   Next Medicare Wellness visit to be scheduled in 1 year.       Additional E&M Note during same encounter follows:  Patient has multiple medical problems which are significant and separately identifiable that require additional work above and beyond the Medicare Wellness Visit.      Chief Complaint  Medicare Wellness-subsequent, Hypertension, and Hypothyroidism    Subjective        HPI  Loly Terraazs is also being seen today for   HTN: She is taking her atenolol but she will not take any further meds.  She has not had her meds.  She is having some dizziness at times \"not very often\".  \"I still am not walking good\".     THYROID:  She is taking the synthroid.    Her feet and legs are swelling to where her bruising on her feet.  She will get strange bruising.  She has not been taking the HCTZ regularly.  She is having some \"gout\" pain--she said she was dx years ago in [place] and she wants to know if she can have something for the gout.          Objective   Vital Signs:  /80   Pulse 66   Temp 98.1 °F (36.7 °C)   Resp 18   Ht 157.5 cm (62\")   Wt 69.3 kg (152 lb 11.2 oz)   SpO2 94%   BMI 27.93 kg/m²     Physical Exam  Vitals " reviewed.   Constitutional:       Appearance: Normal appearance. She is well-developed.   Cardiovascular:      Rate and Rhythm: Normal rate and regular rhythm.      Heart sounds: Normal heart sounds. No murmur heard.  Pulmonary:      Effort: Pulmonary effort is normal.      Breath sounds: Normal breath sounds.   Musculoskeletal:      Right lower leg: Edema present.      Left lower leg: Edema present.   Neurological:      Mental Status: She is alert and oriented to person, place, and time.      Cranial Nerves: No cranial nerve deficit.      Motor: No weakness.   Psychiatric:         Mood and Affect: Mood and affect normal.          The following data was reviewed by: EARLE Lo on 06/01/2023:  Common labs        6/16/2022    11:07 12/8/2022    10:55   Common Labs   Glucose 100   85     BUN 18   18     Creatinine 0.95   0.74     Sodium 143   144     Potassium 4.6   5.0     Chloride 98   105     Calcium 10.3   9.5     Albumin 4.90   4.00     Total Bilirubin 0.5   0.2     Alkaline Phosphatase 71   69     AST (SGOT) 23   20     ALT (SGPT) 22   12     WBC 6.38   6.00     Hemoglobin 14.2   11.1     Hematocrit 43.9   34.1     Platelets 197   187     Total Cholesterol 198   167     Triglycerides 175   111     HDL Cholesterol 45   44     LDL Cholesterol  122   103                  Assessment and Plan   Diagnoses and all orders for this visit:    1. Medicare annual wellness visit, subsequent (Primary)    2. Leg swelling  -     hydroCHLOROthiazide (HYDRODIURIL) 25 MG tablet; Take 1 tablet by mouth Daily.  Dispense: 90 tablet; Refill: 1    3. Acquired hypothyroidism  -     levothyroxine (SYNTHROID, LEVOTHROID) 50 MCG tablet; Take 1 tablet by mouth Daily.  Dispense: 90 tablet; Refill: 1    4. Dizziness  -     meclizine (ANTIVERT) 25 MG tablet; Take 1 tablet by mouth 3 (Three) Times a Day As Needed for Dizziness.  Dispense: 45 tablet; Refill: 1    5. Essential hypertension  -     Comprehensive Metabolic Panel  -      CBC & Differential  -     TSH  -     Lipid Panel  -     atenolol (TENORMIN) 50 MG tablet; Take 1 tablet by mouth Daily.  Dispense: 90 tablet; Refill: 1    6. Pain in other joint  -     Uric Acid             Follow Up   Return in about 6 months (around 12/1/2023).  Patient was given instructions and counseling regarding her condition or for health maintenance advice. Please see specific information pulled into the AVS if appropriate.   Follow-up in 6 months for labs and appt. Call with any concerns or questions that you may have regarding your medications or history.    I have reviewed all medications and at this time no medications changes need to be adjusted for all chronic conditions.  We will treat gout if the uric acid level is elevated.  Parts of this note are electronic transcriptions/translations of spoken language to printed text using the Dragon Dictation system.  Keisha Sullivan, APRN   06/01/2023

## 2023-06-01 ENCOUNTER — OFFICE VISIT (OUTPATIENT)
Dept: FAMILY MEDICINE CLINIC | Facility: CLINIC | Age: 88
End: 2023-06-01

## 2023-06-01 VITALS
OXYGEN SATURATION: 94 % | RESPIRATION RATE: 18 BRPM | SYSTOLIC BLOOD PRESSURE: 120 MMHG | HEART RATE: 66 BPM | HEIGHT: 62 IN | BODY MASS INDEX: 28.1 KG/M2 | DIASTOLIC BLOOD PRESSURE: 80 MMHG | WEIGHT: 152.7 LBS | TEMPERATURE: 98.1 F

## 2023-06-01 DIAGNOSIS — I10 ESSENTIAL HYPERTENSION: ICD-10-CM

## 2023-06-01 DIAGNOSIS — E03.9 ACQUIRED HYPOTHYROIDISM: ICD-10-CM

## 2023-06-01 DIAGNOSIS — R42 DIZZINESS: ICD-10-CM

## 2023-06-01 DIAGNOSIS — Z00.00 MEDICARE ANNUAL WELLNESS VISIT, SUBSEQUENT: Primary | ICD-10-CM

## 2023-06-01 DIAGNOSIS — M79.89 LEG SWELLING: ICD-10-CM

## 2023-06-01 DIAGNOSIS — M25.59 PAIN IN OTHER JOINT: ICD-10-CM

## 2023-06-01 LAB
BASOPHILS # BLD AUTO: 0.02 10*3/MM3 (ref 0–0.2)
BASOPHILS NFR BLD AUTO: 0.3 % (ref 0–1.5)
DEPRECATED RDW RBC AUTO: 41.7 FL (ref 37–54)
EOSINOPHIL # BLD AUTO: 0.15 10*3/MM3 (ref 0–0.4)
EOSINOPHIL NFR BLD AUTO: 2.5 % (ref 0.3–6.2)
ERYTHROCYTE [DISTWIDTH] IN BLOOD BY AUTOMATED COUNT: 12.3 % (ref 12.3–15.4)
HCT VFR BLD AUTO: 39 % (ref 34–46.6)
HGB BLD-MCNC: 12.9 G/DL (ref 12–15.9)
IMM GRANULOCYTES # BLD AUTO: 0.01 10*3/MM3 (ref 0–0.05)
IMM GRANULOCYTES NFR BLD AUTO: 0.2 % (ref 0–0.5)
LYMPHOCYTES # BLD AUTO: 1.91 10*3/MM3 (ref 0.7–3.1)
LYMPHOCYTES NFR BLD AUTO: 32.4 % (ref 19.6–45.3)
MCH RBC QN AUTO: 30.8 PG (ref 26.6–33)
MCHC RBC AUTO-ENTMCNC: 33.1 G/DL (ref 31.5–35.7)
MCV RBC AUTO: 93.1 FL (ref 79–97)
MONOCYTES # BLD AUTO: 0.48 10*3/MM3 (ref 0.1–0.9)
MONOCYTES NFR BLD AUTO: 8.1 % (ref 5–12)
NEUTROPHILS NFR BLD AUTO: 3.33 10*3/MM3 (ref 1.7–7)
NEUTROPHILS NFR BLD AUTO: 56.5 % (ref 42.7–76)
NRBC BLD AUTO-RTO: 0 /100 WBC (ref 0–0.2)
PLATELET # BLD AUTO: 185 10*3/MM3 (ref 140–450)
PMV BLD AUTO: 11.4 FL (ref 6–12)
RBC # BLD AUTO: 4.19 10*6/MM3 (ref 3.77–5.28)
TSH SERPL DL<=0.05 MIU/L-ACNC: 2.78 UIU/ML (ref 0.27–4.2)
WBC NRBC COR # BLD: 5.9 10*3/MM3 (ref 3.4–10.8)

## 2023-06-01 PROCEDURE — 85025 COMPLETE CBC W/AUTO DIFF WBC: CPT | Performed by: NURSE PRACTITIONER

## 2023-06-01 PROCEDURE — 84550 ASSAY OF BLOOD/URIC ACID: CPT | Performed by: NURSE PRACTITIONER

## 2023-06-01 PROCEDURE — 80053 COMPREHEN METABOLIC PANEL: CPT | Performed by: NURSE PRACTITIONER

## 2023-06-01 PROCEDURE — 84443 ASSAY THYROID STIM HORMONE: CPT | Performed by: NURSE PRACTITIONER

## 2023-06-01 PROCEDURE — 80061 LIPID PANEL: CPT | Performed by: NURSE PRACTITIONER

## 2023-06-01 RX ORDER — HYDROCHLOROTHIAZIDE 25 MG/1
TABLET ORAL
Qty: 90 TABLET | Refills: 1 | OUTPATIENT
Start: 2023-06-01

## 2023-06-01 RX ORDER — ATENOLOL 50 MG/1
TABLET ORAL
Qty: 90 TABLET | Refills: 1 | OUTPATIENT
Start: 2023-06-01

## 2023-06-01 RX ORDER — HYDROCHLOROTHIAZIDE 25 MG/1
25 TABLET ORAL DAILY
Qty: 90 TABLET | Refills: 1 | Status: SHIPPED | OUTPATIENT
Start: 2023-06-01

## 2023-06-01 RX ORDER — LEVOTHYROXINE SODIUM 0.05 MG/1
50 TABLET ORAL DAILY
Qty: 90 TABLET | Refills: 1 | Status: SHIPPED | OUTPATIENT
Start: 2023-06-01

## 2023-06-01 RX ORDER — ATENOLOL 50 MG/1
50 TABLET ORAL DAILY
Qty: 90 TABLET | Refills: 1 | Status: SHIPPED | OUTPATIENT
Start: 2023-06-01

## 2023-06-01 RX ORDER — LEVOTHYROXINE SODIUM 0.05 MG/1
TABLET ORAL
Qty: 90 TABLET | Refills: 1 | OUTPATIENT
Start: 2023-06-01

## 2023-06-01 RX ORDER — IBUPROFEN 200 MG
200 TABLET ORAL EVERY 6 HOURS PRN
COMMUNITY

## 2023-06-01 RX ORDER — MECLIZINE HYDROCHLORIDE 25 MG/1
25 TABLET ORAL 3 TIMES DAILY PRN
Qty: 45 TABLET | Refills: 1 | Status: SHIPPED | OUTPATIENT
Start: 2023-06-01

## 2023-06-01 RX ORDER — MULTIPLE VITAMINS W/ MINERALS TAB 9MG-400MCG
1 TAB ORAL DAILY
COMMUNITY

## 2023-06-02 LAB
ALBUMIN SERPL-MCNC: 4.5 G/DL (ref 3.5–5.2)
ALBUMIN/GLOB SERPL: 1.5 G/DL
ALP SERPL-CCNC: 71 U/L (ref 39–117)
ALT SERPL W P-5'-P-CCNC: 14 U/L (ref 1–33)
ANION GAP SERPL CALCULATED.3IONS-SCNC: 10 MMOL/L (ref 5–15)
AST SERPL-CCNC: 20 U/L (ref 1–32)
BILIRUB SERPL-MCNC: 0.5 MG/DL (ref 0–1.2)
BUN SERPL-MCNC: 15 MG/DL (ref 8–23)
BUN/CREAT SERPL: 18.8 (ref 7–25)
CALCIUM SPEC-SCNC: 9.8 MG/DL (ref 8.2–9.6)
CHLORIDE SERPL-SCNC: 106 MMOL/L (ref 98–107)
CHOLEST SERPL-MCNC: 174 MG/DL (ref 0–200)
CO2 SERPL-SCNC: 28 MMOL/L (ref 22–29)
CREAT SERPL-MCNC: 0.8 MG/DL (ref 0.57–1)
EGFRCR SERPLBLD CKD-EPI 2021: 68.8 ML/MIN/1.73
GLOBULIN UR ELPH-MCNC: 3 GM/DL
GLUCOSE SERPL-MCNC: 99 MG/DL (ref 65–99)
HDLC SERPL-MCNC: 46 MG/DL (ref 40–60)
LDLC SERPL CALC-MCNC: 107 MG/DL (ref 0–100)
LDLC/HDLC SERPL: 2.28 {RATIO}
POTASSIUM SERPL-SCNC: 4.1 MMOL/L (ref 3.5–5.2)
PROT SERPL-MCNC: 7.5 G/DL (ref 6–8.5)
SODIUM SERPL-SCNC: 144 MMOL/L (ref 136–145)
TRIGL SERPL-MCNC: 116 MG/DL (ref 0–150)
URATE SERPL-MCNC: 5.1 MG/DL (ref 2.4–5.7)
VLDLC SERPL-MCNC: 21 MG/DL (ref 5–40)

## 2023-11-28 DIAGNOSIS — I10 ESSENTIAL HYPERTENSION: ICD-10-CM

## 2023-11-28 DIAGNOSIS — M79.89 LEG SWELLING: ICD-10-CM

## 2023-11-28 DIAGNOSIS — E03.9 ACQUIRED HYPOTHYROIDISM: ICD-10-CM

## 2023-11-28 RX ORDER — LEVOTHYROXINE SODIUM 0.05 MG/1
50 TABLET ORAL DAILY
Qty: 90 TABLET | Refills: 1 | OUTPATIENT
Start: 2023-11-28

## 2023-11-28 RX ORDER — HYDROCHLOROTHIAZIDE 25 MG/1
25 TABLET ORAL DAILY
Qty: 90 TABLET | Refills: 1 | OUTPATIENT
Start: 2023-11-28

## 2023-11-28 RX ORDER — ATENOLOL 50 MG/1
50 TABLET ORAL DAILY
Qty: 90 TABLET | Refills: 1 | OUTPATIENT
Start: 2023-11-28

## 2024-06-06 ENCOUNTER — OFFICE VISIT (OUTPATIENT)
Dept: FAMILY MEDICINE CLINIC | Facility: CLINIC | Age: 89
End: 2024-06-06
Payer: MEDICARE

## 2024-06-06 VITALS
SYSTOLIC BLOOD PRESSURE: 142 MMHG | BODY MASS INDEX: 27.33 KG/M2 | TEMPERATURE: 98.8 F | RESPIRATION RATE: 18 BRPM | HEIGHT: 62 IN | DIASTOLIC BLOOD PRESSURE: 82 MMHG | OXYGEN SATURATION: 90 % | HEART RATE: 94 BPM | WEIGHT: 148.5 LBS

## 2024-06-06 DIAGNOSIS — M79.604 PAIN IN BOTH LOWER EXTREMITIES: ICD-10-CM

## 2024-06-06 DIAGNOSIS — J30.89 SEASONAL ALLERGIC RHINITIS DUE TO OTHER ALLERGIC TRIGGER: ICD-10-CM

## 2024-06-06 DIAGNOSIS — E03.9 ACQUIRED HYPOTHYROIDISM: ICD-10-CM

## 2024-06-06 DIAGNOSIS — Z00.00 MEDICARE ANNUAL WELLNESS VISIT, SUBSEQUENT: Primary | ICD-10-CM

## 2024-06-06 DIAGNOSIS — I10 ESSENTIAL HYPERTENSION: ICD-10-CM

## 2024-06-06 DIAGNOSIS — M79.605 PAIN IN BOTH LOWER EXTREMITIES: ICD-10-CM

## 2024-06-06 DIAGNOSIS — R41.3 MEMORY CHANGE: ICD-10-CM

## 2024-06-06 DIAGNOSIS — R42 DIZZINESS: ICD-10-CM

## 2024-06-06 DIAGNOSIS — E78.2 MIXED HYPERLIPIDEMIA: ICD-10-CM

## 2024-06-06 LAB
ALBUMIN SERPL-MCNC: 4.5 G/DL (ref 3.5–5.2)
ALBUMIN/GLOB SERPL: 1.7 G/DL
ALP SERPL-CCNC: 75 U/L (ref 39–117)
ALT SERPL W P-5'-P-CCNC: 10 U/L (ref 1–33)
ANION GAP SERPL CALCULATED.3IONS-SCNC: 11.9 MMOL/L (ref 5–15)
AST SERPL-CCNC: 12 U/L (ref 1–32)
BASOPHILS # BLD AUTO: 0.03 10*3/MM3 (ref 0–0.2)
BASOPHILS NFR BLD AUTO: 0.5 % (ref 0–1.5)
BILIRUB SERPL-MCNC: 0.4 MG/DL (ref 0–1.2)
BUN SERPL-MCNC: 15 MG/DL (ref 8–23)
BUN/CREAT SERPL: 16.7 (ref 7–25)
CALCIUM SPEC-SCNC: 10 MG/DL (ref 8.2–9.6)
CHLORIDE SERPL-SCNC: 101 MMOL/L (ref 98–107)
CHOLEST SERPL-MCNC: 190 MG/DL (ref 0–200)
CO2 SERPL-SCNC: 28.1 MMOL/L (ref 22–29)
CREAT SERPL-MCNC: 0.9 MG/DL (ref 0.57–1)
DEPRECATED RDW RBC AUTO: 40 FL (ref 37–54)
EGFRCR SERPLBLD CKD-EPI 2021: 59.4 ML/MIN/1.73
EOSINOPHIL # BLD AUTO: 0.21 10*3/MM3 (ref 0–0.4)
EOSINOPHIL NFR BLD AUTO: 3.6 % (ref 0.3–6.2)
ERYTHROCYTE [DISTWIDTH] IN BLOOD BY AUTOMATED COUNT: 11.3 % (ref 12.3–15.4)
GLOBULIN UR ELPH-MCNC: 2.7 GM/DL
GLUCOSE SERPL-MCNC: 88 MG/DL (ref 65–99)
HCT VFR BLD AUTO: 35.8 % (ref 34–46.6)
HDLC SERPL-MCNC: 51 MG/DL (ref 40–60)
HGB BLD-MCNC: 12 G/DL (ref 12–15.9)
IMM GRANULOCYTES # BLD AUTO: 0.01 10*3/MM3 (ref 0–0.05)
IMM GRANULOCYTES NFR BLD AUTO: 0.2 % (ref 0–0.5)
LDLC SERPL CALC-MCNC: 111 MG/DL (ref 0–100)
LDLC/HDLC SERPL: 2.11 {RATIO}
LYMPHOCYTES # BLD AUTO: 1.94 10*3/MM3 (ref 0.7–3.1)
LYMPHOCYTES NFR BLD AUTO: 33.3 % (ref 19.6–45.3)
MCH RBC QN AUTO: 32.3 PG (ref 26.6–33)
MCHC RBC AUTO-ENTMCNC: 33.5 G/DL (ref 31.5–35.7)
MCV RBC AUTO: 96.2 FL (ref 79–97)
MONOCYTES # BLD AUTO: 0.54 10*3/MM3 (ref 0.1–0.9)
MONOCYTES NFR BLD AUTO: 9.3 % (ref 5–12)
NEUTROPHILS NFR BLD AUTO: 3.09 10*3/MM3 (ref 1.7–7)
NEUTROPHILS NFR BLD AUTO: 53.1 % (ref 42.7–76)
NRBC BLD AUTO-RTO: 0 /100 WBC (ref 0–0.2)
PLATELET # BLD AUTO: 198 10*3/MM3 (ref 140–450)
PMV BLD AUTO: 11.5 FL (ref 6–12)
POTASSIUM SERPL-SCNC: 4.7 MMOL/L (ref 3.5–5.2)
PROT SERPL-MCNC: 7.2 G/DL (ref 6–8.5)
RBC # BLD AUTO: 3.72 10*6/MM3 (ref 3.77–5.28)
SODIUM SERPL-SCNC: 141 MMOL/L (ref 136–145)
TRIGL SERPL-MCNC: 158 MG/DL (ref 0–150)
TSH SERPL DL<=0.05 MIU/L-ACNC: 3.4 UIU/ML (ref 0.27–4.2)
VLDLC SERPL-MCNC: 28 MG/DL (ref 5–40)
WBC NRBC COR # BLD AUTO: 5.82 10*3/MM3 (ref 3.4–10.8)

## 2024-06-06 PROCEDURE — 84443 ASSAY THYROID STIM HORMONE: CPT | Performed by: NURSE PRACTITIONER

## 2024-06-06 PROCEDURE — 80053 COMPREHEN METABOLIC PANEL: CPT | Performed by: NURSE PRACTITIONER

## 2024-06-06 PROCEDURE — 80061 LIPID PANEL: CPT | Performed by: NURSE PRACTITIONER

## 2024-06-06 PROCEDURE — 85025 COMPLETE CBC W/AUTO DIFF WBC: CPT | Performed by: NURSE PRACTITIONER

## 2024-06-06 RX ORDER — LEVOTHYROXINE SODIUM 0.05 MG/1
50 TABLET ORAL DAILY
Qty: 90 TABLET | Refills: 0 | Status: SHIPPED | OUTPATIENT
Start: 2024-06-06

## 2024-06-06 RX ORDER — LANOLIN ALCOHOL/MO/W.PET/CERES
1000 CREAM (GRAM) TOPICAL DAILY
Qty: 90 TABLET | Refills: 0 | Status: SHIPPED | OUTPATIENT
Start: 2024-06-06

## 2024-06-06 RX ORDER — IRBESARTAN AND HYDROCHLOROTHIAZIDE 300; 12.5 MG/1; MG/1
1 TABLET, FILM COATED ORAL DAILY
COMMUNITY
Start: 2024-05-17 | End: 2024-06-06

## 2024-06-06 RX ORDER — ATENOLOL 50 MG/1
TABLET ORAL
Qty: 135 TABLET | Refills: 0 | Status: SHIPPED | OUTPATIENT
Start: 2024-06-06

## 2024-06-06 RX ORDER — MECLIZINE HYDROCHLORIDE 25 MG/1
25 TABLET ORAL 3 TIMES DAILY PRN
Qty: 45 TABLET | Refills: 0 | Status: SHIPPED | OUTPATIENT
Start: 2024-06-06

## 2024-06-06 NOTE — PROGRESS NOTES
The ABCs of the Annual Wellness Visit  Subsequent Medicare Wellness Visit    Subjective    Loly Terrazas is a 89 y.o. female who presents for a Subsequent Medicare Wellness Visit.    The following portions of the patient's history were reviewed and   updated as appropriate: allergies, current medications, past family history, past medical history, past social history, past surgical history, and problem list.    Compared to one year ago, the patient feels her physical   health is the same.    Compared to one year ago, the patient feels her mental   health is the same.    Recent Hospitalizations:  She was not admitted to the hospital during the last year.       Current Medical Providers:  Patient Care Team:  Keisha Sullivan APRN as PCP - General (Nurse Practitioner)    Outpatient Medications Prior to Visit   Medication Sig Dispense Refill    ibuprofen (ADVIL,MOTRIN) 200 MG tablet Take 1 tablet by mouth Every 6 (Six) Hours As Needed for Mild Pain.      multivitamin with minerals (MULTIVITAMIN ADULT PO) Take 1 tablet by mouth Daily.      irbesartan-hydrochlorothiazide (AVALIDE) 300-12.5 MG tablet Take 1 tablet by mouth Daily.      levothyroxine (SYNTHROID, LEVOTHROID) 50 MCG tablet Take 1 tablet by mouth Daily. 90 tablet 1    meclizine (ANTIVERT) 25 MG tablet Take 1 tablet by mouth 3 (Three) Times a Day As Needed for Dizziness. 45 tablet 1    atenolol (TENORMIN) 50 MG tablet Take 1 tablet by mouth Daily. (Patient not taking: Reported on 6/6/2024) 90 tablet 1    hydroCHLOROthiazide (HYDRODIURIL) 25 MG tablet Take 1 tablet by mouth Daily. 90 tablet 1     No facility-administered medications prior to visit.       No opioid medication identified on active medication list. I have reviewed chart for other potential  high risk medication/s and harmful drug interactions in the elderly.        Aspirin is not on active medication list.  Aspirin use is not indicated based on review of current medical condition/s. Risk of  "harm outweighs potential benefits.  .    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  ACP discussion was declined by the patient. Patient has an advance directive in EMR which is still valid.      Objective    Vitals:    24 1410 24 1412   BP: 180/90 142/82   Pulse: 94    Resp: 18    Temp: 98.8 °F (37.1 °C)    SpO2: 90%    Weight: 67.4 kg (148 lb 8 oz)    Height: 157.5 cm (62\")      Estimated body mass index is 27.16 kg/m² as calculated from the following:    Height as of this encounter: 157.5 cm (62\").    Weight as of this encounter: 67.4 kg (148 lb 8 oz).    BMI is >= 25 and <30. (Overweight) The following options were offered after discussion;: exercise counseling/recommendations and nutrition counseling/recommendations      Does the patient have evidence of cognitive impairment? No    Lab Results   Component Value Date    TRIG 158 (H) 2024    HDL 51 2024     (H) 2024    VLDL 28 2024        HEALTH RISK ASSESSMENT    Smoking Status:  Social History     Tobacco Use   Smoking Status Former    Current packs/day: 0.00    Average packs/day: 0.3 packs/day for 30.0 years (7.5 ttl pk-yrs)    Types: Cigarettes    Start date:     Quit date: 1979    Years since quittin.4   Smokeless Tobacco Never     Alcohol Consumption:  Social History     Substance and Sexual Activity   Alcohol Use Yes    Alcohol/week: 1.0 standard drink of " alcohol    Types: 1 Glasses of wine per week    Comment: 1 drink per day      Fall Risk Screen:    GUILLERMO Fall Risk Assessment was completed, and patient is at HIGH risk for falls. Assessment completed on:2024    Depression Screenin/6/2024     1:58 PM   PHQ-2/PHQ-9 Depression Screening   Little Interest or Pleasure in Doing Things 0-->not at all   Feeling Down, Depressed or Hopeless 0-->not at all   PHQ-9: Brief Depression Severity Measure Score 0       Health Habits and Functional and Cognitive Screenin/6/2024     1:51 PM   Functional & Cognitive Status   Do you have difficulty preparing food and eating? No   Do you have difficulty bathing yourself, getting dressed or grooming yourself? No   Do you have difficulty using the toilet? No   Do you have difficulty moving around from place to place? Yes   Do you have trouble with steps or getting out of a bed or a chair? Yes   Current Diet Well Balanced Diet   Dental Exam Not up to date        Dental Exam Comment dentures   Eye Exam Not up to date   Exercise (times per week) 0 times per week   Current Exercises Include No Regular Exercise   Do you need help using the phone?  No   Are you deaf or do you have serious difficulty hearing?  No   Do you need help to go to places out of walking distance? Yes   Do you need help shopping? Yes   Do you need help preparing meals?  Yes   Do you need help with housework?  No   Do you need help with laundry? Yes   Do you need help taking your medications? Yes   Do you need help managing money? No   Do you ever drive or ride in a car without wearing a seat belt? No   Have you felt unusual stress, anger or loneliness in the last month? Yes   Who do you live with? Child   If you need help, do you have trouble finding someone available to you? No   Have you been bothered in the last four weeks by sexual problems? No       Age-appropriate Screening Schedule:  Refer to the list below for future screening  recommendations based on patient's age, sex and/or medical conditions. Orders for these recommended tests are listed in the plan section. The patient has been provided with a written plan.    Health Maintenance   Topic Date Due    DXA SCAN  Never done    TDAP/TD VACCINES (1 - Tdap) Never done    ZOSTER VACCINE (1 of 2) Never done    COVID-19 Vaccine (1 - -24 season) 2024 (Originally 2023)    RSV Vaccine - Adults (1 - 1-dose 60+ series) 2025 (Originally 6/10/1989)    INFLUENZA VACCINE  2024    ANNUAL WELLNESS VISIT  2025    LIPID PANEL  2025    BMI FOLLOWUP  2025    Pneumococcal Vaccine 65+  Completed                ATTENTION  What is the year: correct  What is the month of the year: incorrect  What is the day of the week?: correct  What is the date?: incorrect  MEMORY  Repeat address three times, only score third attempt: [address], [place]: 0  HOW MANY ANIMALS DID THE PATIENT NAME  Verbal Fluency -- Animal Names (0-25): 7-8  CLOCK DRAWING  Clock Drawing: Incorrect  MEMORY RECALL  Tell me what you remember about that name and address we were repeating at the beginnin  ACE TOTAL SCORE  Total ACE Score - <25/30 strongly suggests cognitive impairment; <21/30 almost certainly shows dementia: 4    CMS Preventative Services Quick Reference  Risk Factors Identified During Encounter  Depression/Dysphoria:  declines med  Hearing Problem:  declines hearing test  Immunizations Discussed/Encouraged:  does not do vaccines  Dental Screening Recommended  Vision Screening Recommended  The above risks/problems have been discussed with the patient.  Pertinent information has been shared with the patient in the After Visit Summary.  An After Visit Summary and PPPS were made available to the patient.    Follow Up:   Next Medicare Wellness visit to be scheduled in 1 year.       Additional E&M Note during same encounter follows:  Patient has multiple medical  "problems which are significant and separately identifiable that require additional work above and beyond the Medicare Wellness Visit.      Chief Complaint  Hypertension, Hypothyroidism, Medicare Wellness-subsequent, and Allergic Rhinitis (Eyes swelling )    Subjective        Hypertension  Pertinent negatives include no chest pain or shortness of breath.   Hypothyroidism  Associated symptoms include arthralgias, fatigue and myalgias. Pertinent negatives include no chest pain, coughing, nausea or vomiting.   Allergic Rhinitis  She complains of fatigue. She reports no cough.     Loly Terrazas is also being seen today for   She is returning to the office from Dr Casillas's office.  She changed PCP due to insurance and is wanting to come back here.  He changed her BP med and she does not like it.  It causes her to get up more frequently and she wants back on her reg med she used to be on.  She is having nasal running and would like something for that if poss.    She is also having chronic pain and wants to know what she can put on her right lower leg to help the pain.  It is from a fracture 40 + years ago.  THYROID:  She is taking her meds routinely  She takes her dizziness meds when needed.  Review of Systems   Constitutional:  Positive for fatigue.   Respiratory:  Negative for cough and shortness of breath.    Cardiovascular:  Negative for chest pain and leg swelling.   Gastrointestinal:  Negative for nausea and vomiting.   Musculoskeletal:  Positive for arthralgias, back pain, gait problem and myalgias.   Psychiatric/Behavioral:  Negative for hallucinations and suicidal ideas.        Objective   Vital Signs:  /82   Pulse 94   Temp 98.8 °F (37.1 °C)   Resp 18   Ht 157.5 cm (62\")   Wt 67.4 kg (148 lb 8 oz)   SpO2 90%   BMI 27.16 kg/m²     Physical Exam  Vitals reviewed.   Constitutional:       Appearance: Normal appearance. She is well-developed.   Cardiovascular:      Rate and Rhythm: Normal rate and regular " rhythm.      Heart sounds: Normal heart sounds. No murmur heard.  Pulmonary:      Effort: Pulmonary effort is normal.      Breath sounds: Normal breath sounds.   Neurological:      Mental Status: She is alert. Mental status is at baseline.      Cranial Nerves: No cranial nerve deficit.      Motor: No weakness.   Psychiatric:         Mood and Affect: Mood and affect normal.          The following data was reviewed by: EARLE Lo on 06/06/2024:  Common labs          6/6/2024    14:42   Common Labs   Glucose 88    BUN 15    Creatinine 0.90    Sodium 141    Potassium 4.7    Chloride 101    Calcium 10.0    Albumin 4.5    Total Bilirubin 0.4    Alkaline Phosphatase 75    AST (SGOT) 12    ALT (SGPT) 10    WBC 5.82    Hemoglobin 12.0    Hematocrit 35.8    Platelets 198    Total Cholesterol 190    Triglycerides 158    HDL Cholesterol 51    LDL Cholesterol  111                 Assessment and Plan   Diagnoses and all orders for this visit:    1. Medicare annual wellness visit, subsequent (Primary)    2. Dizziness  -     meclizine (ANTIVERT) 25 MG tablet; Take 1 tablet by mouth 3 (Three) Times a Day As Needed for Dizziness.  Dispense: 45 tablet; Refill: 0    3. Acquired hypothyroidism  -     levothyroxine (SYNTHROID, LEVOTHROID) 50 MCG tablet; Take 1 tablet by mouth Daily.  Dispense: 90 tablet; Refill: 0    4. Essential hypertension  -     atenolol (TENORMIN) 50 MG tablet; 1/2 tab in the am and 1 tab in the PM  Dispense: 135 tablet; Refill: 0    5. Seasonal allergic rhinitis due to other allergic trigger    6. Pain in both lower extremities  -     Comprehensive Metabolic Panel  -     CBC & Differential  -     TSH  -     Lipid Panel  -     Diclofenac Sodium (Voltaren Arthritis Pain) 1 % gel gel; Apply 4 g topically to the appropriate area as directed 4 (Four) Times a Day.  Dispense: 100 g; Refill: 0  -     vitamin B-12 (CYANOCOBALAMIN) 1000 MCG tablet; Take 1 tablet by mouth Daily.  Dispense: 90 tablet; Refill:  0    7. Mixed hyperlipidemia  -     Comprehensive Metabolic Panel  -     CBC & Differential  -     TSH  -     Lipid Panel    8. Memory change             Follow Up   Return in about 3 months (around 9/6/2024).  Patient was given instructions and counseling regarding her condition or for health maintenance advice. Please see specific information pulled into the AVS if appropriate.   Check BP twice a day for 2 weeks and keep me posted.  We may need to adjust med again.     Start b12 and if not better in the month the we will add elavil for the pain in the legs.    Keisha Sullivan, APRN  06/06/2024

## 2024-06-08 PROBLEM — R42 DIZZINESS: Status: ACTIVE | Noted: 2024-06-08

## 2024-06-08 PROBLEM — E78.2 MIXED HYPERLIPIDEMIA: Status: ACTIVE | Noted: 2024-06-08

## 2024-09-05 ENCOUNTER — OFFICE VISIT (OUTPATIENT)
Dept: FAMILY MEDICINE CLINIC | Facility: CLINIC | Age: 89
End: 2024-09-05
Payer: MEDICARE

## 2024-09-05 VITALS
TEMPERATURE: 98.4 F | HEIGHT: 62 IN | BODY MASS INDEX: 27.47 KG/M2 | HEART RATE: 83 BPM | RESPIRATION RATE: 16 BRPM | SYSTOLIC BLOOD PRESSURE: 150 MMHG | DIASTOLIC BLOOD PRESSURE: 80 MMHG | WEIGHT: 149.3 LBS | OXYGEN SATURATION: 93 %

## 2024-09-05 DIAGNOSIS — R06.02 SHORTNESS OF BREATH: Primary | ICD-10-CM

## 2024-09-05 DIAGNOSIS — E03.9 ACQUIRED HYPOTHYROIDISM: ICD-10-CM

## 2024-09-05 DIAGNOSIS — R42 DIZZINESS: ICD-10-CM

## 2024-09-05 DIAGNOSIS — R45.1 AGITATION: ICD-10-CM

## 2024-09-05 DIAGNOSIS — M79.605 PAIN IN BOTH LOWER EXTREMITIES: ICD-10-CM

## 2024-09-05 DIAGNOSIS — I10 ESSENTIAL HYPERTENSION: ICD-10-CM

## 2024-09-05 DIAGNOSIS — M79.604 PAIN IN BOTH LOWER EXTREMITIES: ICD-10-CM

## 2024-09-05 DIAGNOSIS — R41.3 MEMORY CHANGE: ICD-10-CM

## 2024-09-05 DIAGNOSIS — R26.9 GAIT DISTURBANCE: ICD-10-CM

## 2024-09-05 DIAGNOSIS — E78.2 MIXED HYPERLIPIDEMIA: ICD-10-CM

## 2024-09-05 LAB
ALBUMIN SERPL-MCNC: 4.5 G/DL (ref 3.5–5.2)
ALBUMIN/GLOB SERPL: 1.7 G/DL
ALP SERPL-CCNC: 81 U/L (ref 39–117)
ALT SERPL W P-5'-P-CCNC: 12 U/L (ref 1–33)
ANION GAP SERPL CALCULATED.3IONS-SCNC: 7.4 MMOL/L (ref 5–15)
AST SERPL-CCNC: 20 U/L (ref 1–32)
BASOPHILS # BLD AUTO: 0.02 10*3/MM3 (ref 0–0.2)
BASOPHILS NFR BLD AUTO: 0.3 % (ref 0–1.5)
BILIRUB SERPL-MCNC: 0.6 MG/DL (ref 0–1.2)
BUN SERPL-MCNC: 14 MG/DL (ref 8–23)
BUN/CREAT SERPL: 17.3 (ref 7–25)
CALCIUM SPEC-SCNC: 9.7 MG/DL (ref 8.2–9.6)
CHLORIDE SERPL-SCNC: 105 MMOL/L (ref 98–107)
CHOLEST SERPL-MCNC: 173 MG/DL (ref 0–200)
CO2 SERPL-SCNC: 28.6 MMOL/L (ref 22–29)
CREAT SERPL-MCNC: 0.81 MG/DL (ref 0.57–1)
DEPRECATED RDW RBC AUTO: 43.6 FL (ref 37–54)
EGFRCR SERPLBLD CKD-EPI 2021: 66.9 ML/MIN/1.73
EOSINOPHIL # BLD AUTO: 0.15 10*3/MM3 (ref 0–0.4)
EOSINOPHIL NFR BLD AUTO: 2.5 % (ref 0.3–6.2)
ERYTHROCYTE [DISTWIDTH] IN BLOOD BY AUTOMATED COUNT: 12.8 % (ref 12.3–15.4)
FOLATE SERPL-MCNC: >20 NG/ML (ref 4.78–24.2)
GLOBULIN UR ELPH-MCNC: 2.7 GM/DL
GLUCOSE SERPL-MCNC: 88 MG/DL (ref 65–99)
HCT VFR BLD AUTO: 36.5 % (ref 34–46.6)
HDLC SERPL-MCNC: 46 MG/DL (ref 40–60)
HGB BLD-MCNC: 11.8 G/DL (ref 12–15.9)
IMM GRANULOCYTES # BLD AUTO: 0.01 10*3/MM3 (ref 0–0.05)
IMM GRANULOCYTES NFR BLD AUTO: 0.2 % (ref 0–0.5)
LDLC SERPL CALC-MCNC: 111 MG/DL (ref 0–100)
LDLC/HDLC SERPL: 2.4 {RATIO}
LYMPHOCYTES # BLD AUTO: 1.67 10*3/MM3 (ref 0.7–3.1)
LYMPHOCYTES NFR BLD AUTO: 27.3 % (ref 19.6–45.3)
MCH RBC QN AUTO: 30.3 PG (ref 26.6–33)
MCHC RBC AUTO-ENTMCNC: 32.3 G/DL (ref 31.5–35.7)
MCV RBC AUTO: 93.6 FL (ref 79–97)
MONOCYTES # BLD AUTO: 0.53 10*3/MM3 (ref 0.1–0.9)
MONOCYTES NFR BLD AUTO: 8.7 % (ref 5–12)
NEUTROPHILS NFR BLD AUTO: 3.74 10*3/MM3 (ref 1.7–7)
NEUTROPHILS NFR BLD AUTO: 61 % (ref 42.7–76)
NRBC BLD AUTO-RTO: 0 /100 WBC (ref 0–0.2)
PLATELET # BLD AUTO: 192 10*3/MM3 (ref 140–450)
PMV BLD AUTO: 11.2 FL (ref 6–12)
POTASSIUM SERPL-SCNC: 3.8 MMOL/L (ref 3.5–5.2)
PROT SERPL-MCNC: 7.2 G/DL (ref 6–8.5)
RBC # BLD AUTO: 3.9 10*6/MM3 (ref 3.77–5.28)
SODIUM SERPL-SCNC: 141 MMOL/L (ref 136–145)
TRIGL SERPL-MCNC: 83 MG/DL (ref 0–150)
TSH SERPL DL<=0.05 MIU/L-ACNC: 5.51 UIU/ML (ref 0.27–4.2)
VIT B12 BLD-MCNC: 484 PG/ML (ref 211–946)
VLDLC SERPL-MCNC: 16 MG/DL (ref 5–40)
WBC NRBC COR # BLD AUTO: 6.12 10*3/MM3 (ref 3.4–10.8)

## 2024-09-05 PROCEDURE — 82746 ASSAY OF FOLIC ACID SERUM: CPT | Performed by: NURSE PRACTITIONER

## 2024-09-05 PROCEDURE — 36415 COLL VENOUS BLD VENIPUNCTURE: CPT | Performed by: NURSE PRACTITIONER

## 2024-09-05 PROCEDURE — 84443 ASSAY THYROID STIM HORMONE: CPT | Performed by: NURSE PRACTITIONER

## 2024-09-05 PROCEDURE — G2211 COMPLEX E/M VISIT ADD ON: HCPCS | Performed by: NURSE PRACTITIONER

## 2024-09-05 PROCEDURE — 1125F AMNT PAIN NOTED PAIN PRSNT: CPT | Performed by: NURSE PRACTITIONER

## 2024-09-05 PROCEDURE — 1159F MED LIST DOCD IN RCRD: CPT | Performed by: NURSE PRACTITIONER

## 2024-09-05 PROCEDURE — 99214 OFFICE O/P EST MOD 30 MIN: CPT | Performed by: NURSE PRACTITIONER

## 2024-09-05 PROCEDURE — 80061 LIPID PANEL: CPT | Performed by: NURSE PRACTITIONER

## 2024-09-05 PROCEDURE — 1160F RVW MEDS BY RX/DR IN RCRD: CPT | Performed by: NURSE PRACTITIONER

## 2024-09-05 PROCEDURE — 85025 COMPLETE CBC W/AUTO DIFF WBC: CPT | Performed by: NURSE PRACTITIONER

## 2024-09-05 PROCEDURE — 80053 COMPREHEN METABOLIC PANEL: CPT | Performed by: NURSE PRACTITIONER

## 2024-09-05 PROCEDURE — 82607 VITAMIN B-12: CPT | Performed by: NURSE PRACTITIONER

## 2024-09-05 RX ORDER — MIRTAZAPINE 7.5 MG/1
7.5 TABLET, FILM COATED ORAL NIGHTLY
Qty: 30 TABLET | Refills: 5 | Status: SHIPPED | OUTPATIENT
Start: 2024-09-05

## 2024-09-05 RX ORDER — LEVOTHYROXINE SODIUM 50 UG/1
50 TABLET ORAL DAILY
Qty: 90 TABLET | Refills: 0 | Status: CANCELLED | OUTPATIENT
Start: 2024-09-05

## 2024-09-05 RX ORDER — LANOLIN ALCOHOL/MO/W.PET/CERES
1000 CREAM (GRAM) TOPICAL DAILY
Qty: 90 TABLET | Refills: 1 | Status: SHIPPED | OUTPATIENT
Start: 2024-09-05

## 2024-09-05 RX ORDER — MECLIZINE HYDROCHLORIDE 25 MG/1
25 TABLET ORAL 3 TIMES DAILY PRN
Qty: 45 TABLET | Refills: 1 | Status: SHIPPED | OUTPATIENT
Start: 2024-09-05

## 2024-09-05 RX ORDER — ATENOLOL 50 MG/1
50 TABLET ORAL EVERY MORNING
Qty: 90 TABLET | Refills: 1 | Status: SHIPPED | OUTPATIENT
Start: 2024-09-05

## 2024-09-05 RX ORDER — LEVOTHYROXINE SODIUM 50 MCG
50 TABLET ORAL DAILY
Qty: 90 TABLET | Refills: 1 | Status: SHIPPED | OUTPATIENT
Start: 2024-09-05

## 2024-09-05 NOTE — PROGRESS NOTES
"Chief Complaint  Hypertension and Hypothyroidism    Subjective          Loly Terrazas presents to [facility]  History of Present Illness  She is here with her daughter  HTN: She is taking her atenolol but she will not take any further meds.  She has not had her meds.  She is having some dizziness at times \"not very often\".  \"I still am not walking good\".     THYROID:  She is taking the synthroid but wants to do name brand.  She wants to get on some O2.  She get's soa at times.  She is willing to do to further testing at this time.    Depression: Not at risk (2024)    PHQ-2     PHQ-2 Score: 0    and 2024               Allergies  Pneumococcal vac polyvalent    Social History     Tobacco Use    Smoking status: Former     Current packs/day: 0.00     Average packs/day: 0.3 packs/day for 30.0 years (7.5 ttl pk-yrs)     Types: Cigarettes     Start date:      Quit date:      Years since quittin.7    Smokeless tobacco: Never   Vaping Use    Vaping status: Never Used   Substance Use Topics    Alcohol use: Yes     Alcohol/week: 1.0 standard drink of alcohol     Types: 1 Glasses of wine per week     Comment: 1 drink per day     Drug use: Never       History reviewed. No pertinent family history.     Health Maintenance Due   Topic Date Due    DXA SCAN  Never done    TDAP/TD VACCINES (1 - Tdap) Never done    ZOSTER VACCINE (1 of 2) Never done    INFLUENZA VACCINE  2024        Immunization History   Administered Date(s) Administered    Influenza, Unspecified 2019    Pneumococcal Conjugate 13-Valent (PCV13) 2019    Pneumococcal Polysaccharide (PPSV23) 2019       Review of Systems   Constitutional:  Positive for fatigue.   Respiratory:  Positive for shortness of breath. Negative for cough.    Cardiovascular:  Negative for chest pain.   Gastrointestinal:  Negative for diarrhea, nausea and vomiting.        Objective       Vitals:    24 1359 24 " "1427   BP: 160/70 150/80   Pulse: 83    Resp: 16    Temp: 98.4 °F (36.9 °C)    SpO2: 93%    Weight: 67.7 kg (149 lb 4.8 oz)    Height: 157.5 cm (62\")        Body mass index is 27.31 kg/m².         Physical Exam  Vitals reviewed.   Constitutional:       Appearance: Normal appearance. She is well-developed.   Cardiovascular:      Rate and Rhythm: Normal rate and regular rhythm.      Heart sounds: Normal heart sounds. No murmur heard.  Pulmonary:      Effort: Pulmonary effort is normal.      Breath sounds: Normal breath sounds.   Neurological:      Mental Status: She is alert. Mental status is at baseline.      Cranial Nerves: No cranial nerve deficit.      Motor: No weakness.   Psychiatric:         Mood and Affect: Mood and affect normal.               Result Review :     The following data was reviewed by: EARLE Lo on 09/05/2024:    Common Labs   Common labs          6/6/2024    14:42 9/5/2024    14:36   Common Labs   Glucose 88  88    BUN 15  14    Creatinine 0.90  0.81    Sodium 141  141    Potassium 4.7  3.8    Chloride 101  105    Calcium 10.0  9.7    Albumin 4.5  4.5    Total Bilirubin 0.4  0.6    Alkaline Phosphatase 75  81    AST (SGOT) 12  20    ALT (SGPT) 10  12    WBC 5.82  6.12    Hemoglobin 12.0  11.8    Hematocrit 35.8  36.5    Platelets 198  192    Total Cholesterol 190  173    Triglycerides 158  83    HDL Cholesterol 51  46    LDL Cholesterol  111  111            No Images in the past 120 days found..              Assessment and Plan      Assessment & Plan  Pain in both lower extremities    Dizziness    Acquired hypothyroidism    Essential hypertension    Shortness of breath  We will do the sleep test and go from there.  Medicare only pays of oxygen level is less than 88 with walking.  Patient's oxygen today was 93 reading.  Patient is willing at this time to do the home sleep test.  Mixed hyperlipidemia     Memory change    Gait disturbance  She has had 3 falls since the last time.  " She does use a walker and has fallen once with a walker.  She has not hit her head or lost consciousness.  Agitation  She has been a little bit agitated as her life is gotten a little disrupted as her daughters grandson and wife along with their child who is 6 has moved into the house.  She is willing to trial medicine to help with the agitation.    Orders Placed This Encounter   Procedures    Comprehensive Metabolic Panel    TSH    Lipid Panel    Vitamin B12 & Folate    CBC Auto Differential    Overnight Sleep Oximetry Study    CBC & Differential     New Medications Ordered This Visit   Medications    vitamin B-12 (CYANOCOBALAMIN) 1000 MCG tablet     Sig: Take 1 tablet by mouth Daily.     Dispense:  90 tablet     Refill:  1    meclizine (ANTIVERT) 25 MG tablet     Sig: Take 1 tablet by mouth 3 (Three) Times a Day As Needed for Dizziness.     Dispense:  45 tablet     Refill:  1    atenolol (TENORMIN) 50 MG tablet     Sig: Take 1 tablet by mouth Every Morning. 1 tab in the am     Dispense:  90 tablet     Refill:  1    Synthroid 50 MCG tablet     Sig: Take 1 tablet by mouth Daily.     Dispense:  90 tablet     Refill:  1    mirtazapine (REMERON) 7.5 MG tablet     Sig: Take 1 tablet by mouth Every Night.     Dispense:  30 tablet     Refill:  5          Diagnosis Plan   1. Shortness of breath  Overnight Sleep Oximetry Study      2. Pain in both lower extremities  vitamin B-12 (CYANOCOBALAMIN) 1000 MCG tablet      3. Dizziness  meclizine (ANTIVERT) 25 MG tablet    Vitamin B12 & Folate      4. Acquired hypothyroidism  Synthroid 50 MCG tablet      5. Essential hypertension  atenolol (TENORMIN) 50 MG tablet    Comprehensive Metabolic Panel    CBC & Differential    TSH    Lipid Panel      6. Mixed hyperlipidemia  Comprehensive Metabolic Panel    CBC & Differential    TSH    Lipid Panel      7. Memory change        8. Gait disturbance        9. Agitation  mirtazapine (REMERON) 7.5 MG tablet                Follow Up     Return  in about 6 months (around 3/5/2025).  She wants to go 6 months versus 3 months for follow-up  Patient was given instructions and counseling regarding her condition or for health maintenance advice. Please see specific information pulled into the AVS if appropriate.     Parts of this note are electronic transcriptions/translations of spoken language to printed text using the Dragon Dictation system.          Keisha Sullivan, EARLE  09/05/2024

## 2024-09-07 NOTE — ASSESSMENT & PLAN NOTE
She has had 3 falls since the last time.  She does use a walker and has fallen once with a walker.  She has not hit her head or lost consciousness.

## 2024-10-29 ENCOUNTER — TELEPHONE (OUTPATIENT)
Dept: FAMILY MEDICINE CLINIC | Facility: CLINIC | Age: 89
End: 2024-10-29
Payer: MEDICARE

## 2024-10-29 RX ORDER — FUROSEMIDE 20 MG/1
20 TABLET ORAL DAILY
Qty: 5 TABLET | Refills: 0 | Status: ON HOLD | OUTPATIENT
Start: 2024-10-29

## 2024-10-29 NOTE — TELEPHONE ENCOUNTER
Caller: LINDY LOPEZ    Relationship: Emergency Contact    Best call back number: [phone]    What is the best time to reach you: N/A    Who are you requesting to speak with (clinical staff, provider,  specific staff member): LEWIS OR HER NURSE    Do you know the name of the person who called: DAUGHTER    What was the call regarding: PATIENT HAS SWOLLEN LEGS AND FEET. SHE CAN'T GET AROUND WELL NOW. SHE IS REFUSING TO GO TO DOCTOR. PATIENT'S DAUGHTER WOULD LIKE TO DISCUSS A WATER PILL AND WHAT SHE CAN DO FOR HER MOTHER. SHE TRIED TO GET HER TO ELEVATE HER FEET AND SHE IS GETTING VERY AGITATED WITH HER AND HER FEET AND LEGS HAVE BEEN SWELLING FOR ABOUT TWO WEEKS. TRANSFERRED TO NURSE TO ADVISE.    Is it okay if the provider responds through MyChart: N/A

## 2024-10-31 DIAGNOSIS — R42 DIZZINESS: ICD-10-CM

## 2024-10-31 RX ORDER — MECLIZINE HYDROCHLORIDE 25 MG/1
TABLET ORAL
Qty: 45 TABLET | Refills: 1 | OUTPATIENT
Start: 2024-10-31

## 2024-11-01 ENCOUNTER — APPOINTMENT (OUTPATIENT)
Dept: GENERAL RADIOLOGY | Facility: HOSPITAL | Age: 89
DRG: 291 | End: 2024-11-01
Payer: MEDICARE

## 2024-11-01 ENCOUNTER — HOSPITAL ENCOUNTER (INPATIENT)
Facility: HOSPITAL | Age: 89
LOS: 7 days | Discharge: NURSING, REHAB OR LONG-TERM CARE | DRG: 291 | End: 2024-11-08
Attending: EMERGENCY MEDICINE | Admitting: INTERNAL MEDICINE
Payer: MEDICARE

## 2024-11-01 DIAGNOSIS — I50.9 ACUTE HEART FAILURE, UNSPECIFIED HEART FAILURE TYPE: ICD-10-CM

## 2024-11-01 DIAGNOSIS — I50.9 ACUTE CONGESTIVE HEART FAILURE, UNSPECIFIED HEART FAILURE TYPE: Primary | ICD-10-CM

## 2024-11-01 DIAGNOSIS — R26.2 DIFFICULTY IN WALKING: ICD-10-CM

## 2024-11-01 DIAGNOSIS — M19.90 ARTHRITIS: ICD-10-CM

## 2024-11-01 LAB
ALBUMIN SERPL-MCNC: 4.2 G/DL (ref 3.5–5.2)
ALBUMIN/GLOB SERPL: 1.6 G/DL
ALP SERPL-CCNC: 76 U/L (ref 39–117)
ALT SERPL W P-5'-P-CCNC: 14 U/L (ref 1–33)
ANION GAP SERPL CALCULATED.3IONS-SCNC: 9 MMOL/L (ref 5–15)
AST SERPL-CCNC: 21 U/L (ref 1–32)
BASOPHILS # BLD AUTO: 0.03 10*3/MM3 (ref 0–0.2)
BASOPHILS NFR BLD AUTO: 0.5 % (ref 0–1.5)
BILIRUB SERPL-MCNC: 1.4 MG/DL (ref 0–1.2)
BUN SERPL-MCNC: 12 MG/DL (ref 8–23)
BUN/CREAT SERPL: 13 (ref 7–25)
CALCIUM SPEC-SCNC: 9.4 MG/DL (ref 8.2–9.6)
CHLORIDE SERPL-SCNC: 101 MMOL/L (ref 98–107)
CO2 SERPL-SCNC: 37 MMOL/L (ref 22–29)
CREAT SERPL-MCNC: 0.92 MG/DL (ref 0.57–1)
DEPRECATED RDW RBC AUTO: 53.7 FL (ref 37–54)
EGFRCR SERPLBLD CKD-EPI 2021: 57.5 ML/MIN/1.73
EOSINOPHIL # BLD AUTO: 0.03 10*3/MM3 (ref 0–0.4)
EOSINOPHIL NFR BLD AUTO: 0.5 % (ref 0.3–6.2)
ERYTHROCYTE [DISTWIDTH] IN BLOOD BY AUTOMATED COUNT: 14.6 % (ref 12.3–15.4)
FLUAV SUBTYP SPEC NAA+PROBE: NOT DETECTED
FLUBV RNA ISLT QL NAA+PROBE: NOT DETECTED
GLOBULIN UR ELPH-MCNC: 2.7 GM/DL
GLUCOSE SERPL-MCNC: 147 MG/DL (ref 65–99)
HCT VFR BLD AUTO: 39.8 % (ref 34–46.6)
HGB BLD-MCNC: 12.1 G/DL (ref 12–15.9)
HOLD SPECIMEN: NORMAL
HOLD SPECIMEN: NORMAL
IMM GRANULOCYTES # BLD AUTO: 0.02 10*3/MM3 (ref 0–0.05)
IMM GRANULOCYTES NFR BLD AUTO: 0.3 % (ref 0–0.5)
LYMPHOCYTES # BLD AUTO: 1.06 10*3/MM3 (ref 0.7–3.1)
LYMPHOCYTES NFR BLD AUTO: 16.3 % (ref 19.6–45.3)
MAGNESIUM SERPL-MCNC: 1.6 MG/DL (ref 1.7–2.3)
MCH RBC QN AUTO: 30.2 PG (ref 26.6–33)
MCHC RBC AUTO-ENTMCNC: 30.4 G/DL (ref 31.5–35.7)
MCV RBC AUTO: 99.3 FL (ref 79–97)
MONOCYTES # BLD AUTO: 0.57 10*3/MM3 (ref 0.1–0.9)
MONOCYTES NFR BLD AUTO: 8.8 % (ref 5–12)
NEUTROPHILS NFR BLD AUTO: 4.78 10*3/MM3 (ref 1.7–7)
NEUTROPHILS NFR BLD AUTO: 73.6 % (ref 42.7–76)
NRBC BLD AUTO-RTO: 0 /100 WBC (ref 0–0.2)
NT-PROBNP SERPL-MCNC: 3121 PG/ML (ref 0–1800)
PLATELET # BLD AUTO: 146 10*3/MM3 (ref 140–450)
PMV BLD AUTO: 11.6 FL (ref 6–12)
POTASSIUM SERPL-SCNC: 3 MMOL/L (ref 3.5–5.2)
PROT SERPL-MCNC: 6.9 G/DL (ref 6–8.5)
RBC # BLD AUTO: 4.01 10*6/MM3 (ref 3.77–5.28)
RSV RNA NPH QL NAA+NON-PROBE: NOT DETECTED
SARS-COV-2 RNA RESP QL NAA+PROBE: NOT DETECTED
SODIUM SERPL-SCNC: 147 MMOL/L (ref 136–145)
TROPONIN T SERPL HS-MCNC: 32 NG/L
TSH SERPL DL<=0.05 MIU/L-ACNC: 2.06 UIU/ML (ref 0.27–4.2)
WBC NRBC COR # BLD AUTO: 6.49 10*3/MM3 (ref 3.4–10.8)
WHOLE BLOOD HOLD COAG: NORMAL
WHOLE BLOOD HOLD SPECIMEN: NORMAL

## 2024-11-01 PROCEDURE — 99223 1ST HOSP IP/OBS HIGH 75: CPT | Performed by: INTERNAL MEDICINE

## 2024-11-01 PROCEDURE — 93005 ELECTROCARDIOGRAM TRACING: CPT | Performed by: EMERGENCY MEDICINE

## 2024-11-01 PROCEDURE — 93010 ELECTROCARDIOGRAM REPORT: CPT | Performed by: STUDENT IN AN ORGANIZED HEALTH CARE EDUCATION/TRAINING PROGRAM

## 2024-11-01 PROCEDURE — 83880 ASSAY OF NATRIURETIC PEPTIDE: CPT | Performed by: EMERGENCY MEDICINE

## 2024-11-01 PROCEDURE — 25010000002 POTASSIUM CHLORIDE 10 MEQ/100ML SOLUTION: Performed by: EMERGENCY MEDICINE

## 2024-11-01 PROCEDURE — 84443 ASSAY THYROID STIM HORMONE: CPT | Performed by: INTERNAL MEDICINE

## 2024-11-01 PROCEDURE — 25010000002 FUROSEMIDE PER 20 MG: Performed by: INTERNAL MEDICINE

## 2024-11-01 PROCEDURE — 71045 X-RAY EXAM CHEST 1 VIEW: CPT

## 2024-11-01 PROCEDURE — 85025 COMPLETE CBC W/AUTO DIFF WBC: CPT | Performed by: EMERGENCY MEDICINE

## 2024-11-01 PROCEDURE — 83735 ASSAY OF MAGNESIUM: CPT | Performed by: EMERGENCY MEDICINE

## 2024-11-01 PROCEDURE — 25010000002 MAGNESIUM SULFATE 2 GM/50ML SOLUTION: Performed by: INTERNAL MEDICINE

## 2024-11-01 PROCEDURE — 87637 SARSCOV2&INF A&B&RSV AMP PRB: CPT | Performed by: EMERGENCY MEDICINE

## 2024-11-01 PROCEDURE — 93005 ELECTROCARDIOGRAM TRACING: CPT

## 2024-11-01 PROCEDURE — 25010000002 FUROSEMIDE PER 20 MG: Performed by: EMERGENCY MEDICINE

## 2024-11-01 PROCEDURE — 84484 ASSAY OF TROPONIN QUANT: CPT | Performed by: EMERGENCY MEDICINE

## 2024-11-01 PROCEDURE — 25010000002 LABETALOL 5 MG/ML SOLUTION: Performed by: EMERGENCY MEDICINE

## 2024-11-01 PROCEDURE — 99291 CRITICAL CARE FIRST HOUR: CPT

## 2024-11-01 PROCEDURE — 80053 COMPREHEN METABOLIC PANEL: CPT | Performed by: EMERGENCY MEDICINE

## 2024-11-01 RX ORDER — SODIUM CHLORIDE 0.9 % (FLUSH) 0.9 %
10 SYRINGE (ML) INJECTION AS NEEDED
Status: DISCONTINUED | OUTPATIENT
Start: 2024-11-01 | End: 2024-11-06

## 2024-11-01 RX ORDER — SODIUM CHLORIDE 0.9 % (FLUSH) 0.9 %
10 SYRINGE (ML) INJECTION EVERY 12 HOURS SCHEDULED
Status: DISCONTINUED | OUTPATIENT
Start: 2024-11-01 | End: 2024-11-08 | Stop reason: HOSPADM

## 2024-11-01 RX ORDER — POTASSIUM CHLORIDE 7.45 MG/ML
10 INJECTION INTRAVENOUS
Status: COMPLETED | OUTPATIENT
Start: 2024-11-01 | End: 2024-11-01

## 2024-11-01 RX ORDER — LABETALOL HYDROCHLORIDE 5 MG/ML
10 INJECTION, SOLUTION INTRAVENOUS ONCE
Status: DISCONTINUED | OUTPATIENT
Start: 2024-11-01 | End: 2024-11-01

## 2024-11-01 RX ORDER — SODIUM CHLORIDE 9 MG/ML
40 INJECTION, SOLUTION INTRAVENOUS AS NEEDED
Status: DISCONTINUED | OUTPATIENT
Start: 2024-11-01 | End: 2024-11-08 | Stop reason: HOSPADM

## 2024-11-01 RX ORDER — ENOXAPARIN SODIUM 100 MG/ML
40 INJECTION SUBCUTANEOUS DAILY
Status: DISCONTINUED | OUTPATIENT
Start: 2024-11-02 | End: 2024-11-05

## 2024-11-01 RX ORDER — MAGNESIUM SULFATE HEPTAHYDRATE 40 MG/ML
2 INJECTION, SOLUTION INTRAVENOUS ONCE
Status: COMPLETED | OUTPATIENT
Start: 2024-11-01 | End: 2024-11-01

## 2024-11-01 RX ORDER — SODIUM CHLORIDE 0.9 % (FLUSH) 0.9 %
10 SYRINGE (ML) INJECTION AS NEEDED
Status: DISCONTINUED | OUTPATIENT
Start: 2024-11-01 | End: 2024-11-08 | Stop reason: HOSPADM

## 2024-11-01 RX ORDER — NITROGLYCERIN 10 MG/100ML
5-200 INJECTION INTRAVENOUS
Status: DISCONTINUED | OUTPATIENT
Start: 2024-11-01 | End: 2024-11-01

## 2024-11-01 RX ORDER — HYDRALAZINE HYDROCHLORIDE 20 MG/ML
10 INJECTION INTRAMUSCULAR; INTRAVENOUS ONCE
Status: DISCONTINUED | OUTPATIENT
Start: 2024-11-01 | End: 2024-11-01

## 2024-11-01 RX ORDER — FUROSEMIDE 10 MG/ML
40 INJECTION INTRAMUSCULAR; INTRAVENOUS ONCE
Status: COMPLETED | OUTPATIENT
Start: 2024-11-01 | End: 2024-11-01

## 2024-11-01 RX ORDER — NITROGLYCERIN 20 MG/100ML
5-200 INJECTION INTRAVENOUS
Status: DISCONTINUED | OUTPATIENT
Start: 2024-11-01 | End: 2024-11-02

## 2024-11-01 RX ORDER — FUROSEMIDE 10 MG/ML
40 INJECTION INTRAMUSCULAR; INTRAVENOUS
Status: DISCONTINUED | OUTPATIENT
Start: 2024-11-01 | End: 2024-11-02

## 2024-11-01 RX ADMIN — POTASSIUM CHLORIDE 10 MEQ: 7.45 INJECTION INTRAVENOUS at 20:23

## 2024-11-01 RX ADMIN — FUROSEMIDE 40 MG: 10 INJECTION, SOLUTION INTRAMUSCULAR; INTRAVENOUS at 21:55

## 2024-11-01 RX ADMIN — POTASSIUM CHLORIDE 10 MEQ: 7.45 INJECTION INTRAVENOUS at 18:42

## 2024-11-01 RX ADMIN — POTASSIUM CHLORIDE 10 MEQ: 7.45 INJECTION INTRAVENOUS at 21:55

## 2024-11-01 RX ADMIN — Medication 10 ML: at 21:56

## 2024-11-01 RX ADMIN — LABETALOL HYDROCHLORIDE 10 MG: 5 INJECTION, SOLUTION INTRAVENOUS at 16:49

## 2024-11-01 RX ADMIN — FUROSEMIDE 40 MG: 10 INJECTION, SOLUTION INTRAMUSCULAR; INTRAVENOUS at 16:34

## 2024-11-01 RX ADMIN — MAGNESIUM SULFATE IN WATER 2 G: 40 INJECTION, SOLUTION INTRAVENOUS at 19:02

## 2024-11-01 RX ADMIN — POTASSIUM CHLORIDE 10 MEQ: 7.45 INJECTION INTRAVENOUS at 16:59

## 2024-11-01 RX ADMIN — NITROGLYCERIN 1 INCH: 20 OINTMENT TOPICAL at 16:56

## 2024-11-01 NOTE — H&P
[facility]  [facility]: [facility]   Patient Name: Loly Terrazas  : 6/10/1929  MRN: [id number]  Primary Care Physician:  Keisha Sullivan APRN  Date of admission: 2024    Subjective   Subjective     Chief Complaint: Shortness of breath    HPI:    Loly Terrazas is a 89 y.o. female past medical history of COPD, hypertension, CVA, and hypothyroidism who presents with shortness of breath for a month    The patient's history was obtained from her and her daughters at bedside.  Patient's had some leg swelling and some shortness of breath for about 1 month.  However over the last 1 to 2 weeks has gotten progressively worse.  She has orthopnea.  She has PND.  Worsening lower extremity edema.  She will scan the ER for further evaluation    In the emergency department the patient's vital signs are as follows: Temperature  98.1, pulse 51, respiratory rate was 20, blood pressure 220/66, 85% on room air.  CBC shows no acute abnormalities.  CMP shows normal creatinine.  Sodium is 147.  BNP is 3000 with previous BNP of 419 and 202..  Chest x-ray shows cardiomegaly suggestive cardiomyopathy and/or pericardial effusion.  Likely small right pleural effusion with adjacent atelectasis and/or pneumonia.  Patient was given potassium.  She is given 40 of IV Lasix.  Patient's heart failure symptoms are most likely due to diastolic dysfunction from severe hypertension.  She was given Nitropaste if this does not reduce her pressure effectively they will need to started on nitroglycerin drip.  Patient to the hospital for heart failure with unknown EF due to hypertensive emergency causing acute hypoxic respiratory failure.    All symptoms reviewed abdomen as noted above    Personal History     Past Medical History:  COPD  Hypertension  CVA  Hypothyroidism      Past Surgical History:  Cholecystectomy  Leg surgery on thyroidectomy    Family History:   No known family history of heart  failure    Social History:   Former smoker.  Occasional alcohol.    Home Medications:  Diclofenac Sodium, atenolol, furosemide, ibuprofen, levothyroxine, meclizine, mirtazapine, multivitamin with minerals, and vitamin B-12    Allergies:  Allergies   Allergen Reactions    Pneumococcal Vac Polyvalent Swelling         Objective   Objective     Vitals:   Temp:  [98.1 °F (36.7 °C)] 98.1 °F (36.7 °C)  Heart Rate:  [48-51] 48  Resp:  [20] 20  BP: (187-224)/(71-93) 218/79  Flow (L/min) (Oxygen Therapy):  [2] 2    Physical Exam    Constitutional: Awake, alert, no acute distress   Eyes: Pupils equal, sclerae anicteric, no conjunctival injection   HENT: NCAT, mucous membranes moist   Neck: Supple, no thyromegaly, no lymphadenopathy, trachea midline   Respiratory: Crackles in bases with occasional rhonchi   Cardiovascular: RRR, no murmurs, rubs, or gallops, palpable pedal pulses bilaterally   Gastrointestinal: Positive bowel sounds, soft, nontender, nondistended   Musculoskeletal: 2+ pitting edema no clubbing or cyanosis to extremities   Psychiatric: Appropriate affect, cooperative   Neurologic: Oriented x 3, strength symmetric in all extremities, Cranial Nerves grossly intact to confrontation, speech clear   Skin: No rashes     Result Review    Result Review:  I have personally reviewed the results from the time of this admission to 11/1/2024 16:57 EDT and agree with these findings:  Imaging and labs reviewed      Assessment & Plan   Assessment / Plan     Assessment/Plan:   Acute heart failure exacerbation with unknown EF with BNP of 3100  Acute hypoxic respiratory failure  Hypothyroidism  Hypertensive emergency with systolic blood pressures in the 220s  Hypomagnesia  Hypokalemia    Plan:  -- Admit to hospital service  -- Admit to telemetry  -- Patient put out a liter after 40 of IV Lasix  -- Will do diuretics twice daily  -- Strict I's and O's  -- Echocardiogram  -- Nitroglycerin drip for goal systolic blood pressure of  170-180 I do not want it lower than 170  -- Unclear how long the patient blood pressures been up so do not want to drop it too quickly        VTE Prophylaxis:  Lovenox        CODE STATUS:     Code    Admission Status:  I believe this patient meets admission status.    Electronically signed by Jose Barnard MD, 11/01/24, 4:57 PM EDT.

## 2024-11-01 NOTE — ED PROVIDER NOTES
Time: 3:16 PM EDT  Date of encounter:  11/1/2024  Independent Historian/Clinical History and Information was obtained by:   Patient    History is limited by: N/A    Chief Complaint   Patient presents with    Shortness of Breath     Pt arrived ambulatory to triage with c/o of chest pain and SOA. Pt reports increased swelling in lower extremities over the past month. Pt denies hx of CHF. Pt stating 85% on RA in triage. Pt placed on 2L O2 and O2 sat increased to 95. Pt reports hx of COPD.     Chest Pain         History of Present Illness:  Patient is a 89 y.o. year old female who presents to the emergency department companied by her daughter for evaluation of swelling, shortness of breath.  Daughter reports that the patient's swelling has started about a month ago.  She has been on diuretics intermittently from her doctor.  Patient reports in the past week she started to feel weaker and shortness of breath has been worsening.  Also complains of facial swelling.  Denies fever/chills, nausea/vomiting, chest pain.  O2 saturation on room air was 85, the patient was placed on oxygen.  EARLE Junior    Patient Care Team  Primary Care Provider: Keisha Sullivan APRN    Past Medical History:     Allergies   Allergen Reactions    Pneumococcal Vac Polyvalent Swelling     Past Medical History:   Diagnosis Date    Allergic rhinitis due to allergen 11/05/2018    Anemia 11/14/2019    Anxiety disorder 10/26/2020    Arthritis     COPD (chronic obstructive pulmonary disease) 11/05/2018    DDD (degenerative disc disease), lumbar 11/14/2019    Deafness     Dyspnea 11/14/2019    Essential hypertension 11/05/2018    Fall 11/14/2019    SEAN (generalized anxiety disorder) 11/11/2020    Gait disturbance 11/05/2018    History of stroke 11/05/2018    Hypothyroidism 11/05/2018    Insomnia 05/02/2019    Joint pain 10/26/2020    Low hemoglobin 11/14/2019    Lumbago 08/21/2019    Lumbar herniated disc     L5-S1    Memory change 11/05/2018     Renal insufficiency 10/26/2020    Scoliosis     Sinus node dysfunction     Statin intolerance 11/14/2019    Tremor     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 No pertinent family history.    Home Medications:  Prior to Admission medications    Medication Sig Start Date End Date Taking? Authorizing Provider   atenolol (TENORMIN) 50 MG tablet Take 1 tablet by mouth Every Morning. 1 tab in the am 9/5/24   Keisha Sullivan APRN   Diclofenac Sodium (Voltaren Arthritis Pain) 1 % gel gel Apply 4 g topically to the appropriate area as directed 4 (Four) Times a Day. 6/6/24   Keisha Sullivan APRN   furosemide (Lasix) 20 MG tablet Take 1 tablet by mouth Daily. 10/29/24   Keisha Sullivan APRN   ibuprofen (ADVIL,MOTRIN) 200 MG tablet Take 1 tablet by mouth Every 6 (Six) Hours As Needed for Mild Pain.    Provider, MD Oli   levothyroxine (SYNTHROID, LEVOTHROID) 50 MCG tablet Take 1 tablet by mouth Daily. 6/6/24   Keisha Sullivan APRN   meclizine (ANTIVERT) 25 MG tablet Take 1 tablet by mouth 3 (Three) Times a Day As Needed for Dizziness. 9/5/24   Keisha Sullivan APRN   mirtazapine (REMERON) 7.5 MG tablet Take 1 tablet by mouth Every Night. 9/5/24   Keisha Sullivan APRN   multivitamin with minerals (MULTIVITAMIN ADULT PO) Take 1 tablet by mouth Daily.    Provider, MD Oli   Synthroid 50 MCG tablet Take 1 tablet by mouth Daily. 9/5/24   Keisha Sullivan APRN   vitamin B-12 (CYANOCOBALAMIN) 1000 MCG tablet Take 1 tablet by mouth Daily. 9/5/24   Keisha Sullivan APRN        Social History:   Social History     Tobacco Use    Smoking status: Former     Current packs/day: 0.00     Average packs/day: 0.3 packs/day for 30.0 years (7.5 ttl pk-yrs)     Types: Cigarettes     Start date: 1949     Quit date: 1979     Years since  "quittin.8    Smokeless tobacco: Never   Vaping Use    Vaping status: Never Used   Substance Use Topics    Alcohol use: Yes     Alcohol/week: 1.0 standard drink of alcohol     Types: 1 Glasses of wine per week     Comment: 1 drink per day     Drug use: Never         Review of Systems:  Review of Systems   Respiratory:  Positive for shortness of breath.    Cardiovascular:  Positive for leg swelling.   Neurological:  Positive for headaches.        Physical Exam:  BP (!) 225/66   Pulse 51   Temp 98.1 °F (36.7 °C) (Oral)   Resp 20   Ht 157.5 cm (62\")   Wt 71.7 kg (158 lb 1.1 oz)   SpO2 99%   BMI 28.91 kg/m²         Physical Exam  Vitals and nursing note reviewed.   Constitutional:       General: She is not in acute distress.  Cardiovascular:      Rate and Rhythm: Normal rate and regular rhythm.      Heart sounds: Normal heart sounds.   Pulmonary:      Effort: Pulmonary effort is normal. No respiratory distress.      Breath sounds: Normal breath sounds.   Abdominal:      General: Abdomen is flat. There is no distension.      Palpations: Abdomen is soft.      Tenderness: There is no abdominal tenderness.   Musculoskeletal:         General: Normal range of motion.      Cervical back: Normal range of motion.      Right lower leg: Edema present.      Left lower leg: Edema present.      Comments: 2+ pitting edema bilateral lower extremities   Skin:     General: Skin is warm and dry.   Neurological:      Mental Status: She is alert and oriented to person, place, and time. Mental status is at baseline.                      Procedures:  Procedures      Medical Decision Making:      Comorbidities that affect care:    COPD    External Notes reviewed:    Previous Clinic Note: Seen 2024 by PCP for shortness of breath      The following orders were placed and all results were independently analyzed by me:  Orders Placed This Encounter   Procedures    COVID-19, FLU A/B, RSV PCR 1 HR TAT - Swab, Nasopharynx    XR Chest 1 " View    [place] Draw    Comprehensive Metabolic Panel    BNP    Single High Sensitivity Troponin T    CBC Auto Differential    Magnesium    NPO Diet NPO Type: Strict NPO    Undress & Gown    Continuous Pulse Oximetry    Vital Signs    Inpatient Hospitalist Consult    Oxygen Therapy- Nasal Cannula; Titrate 1-6 LPM Per SpO2; 90 - 95%    ECG 12 Lead ED Triage Standing Order; SOA    Insert Peripheral IV    CBC & Differential    Green Top (Gel)    Lavender Top    Gold Top - SST    Light Blue Top       Medications Given in the Emergency Department:  Medications   sodium chloride 0.9 % flush 10 mL (has no administration in time range)   Potassium Replacement - Follow Nurse / BPA Driven Protocol (has no administration in time range)   potassium chloride 10 mEq in 100 mL IVPB (10 mEq Intravenous New Bag 11/1/24 1659)   furosemide (LASIX) injection 40 mg (40 mg Intravenous Given 11/1/24 1634)   nitroglycerin (NITROSTAT) ointment 1 inch (1 inch Topical Given 11/1/24 1656)        ED Course:    The patient was initially evaluated in the triage area where orders were placed. The patient was later dispositioned by Rosendo Liriano DO.      The patient was advised to stay for completion of workup which includes but is not limited to communication of labs and radiological results, reassessment and plan. The patient was advised that leaving prior to disposition by a provider could result in critical findings that are not communicated to the patient.          Labs:    Lab Results (last 24 hours)       Procedure Component Value Units Date/Time    CBC & Differential [[id number]]  (Abnormal) Collected: 11/01/24 1540    Specimen: Blood Updated: 11/01/24 1547    Narrative:      The following orders were created for panel order CBC & Differential.  Procedure                               Abnormality         Status                     ---------                               -----------         ------                     CBC Auto  Differential[[id number]]        Abnormal            Final result                 Please view results for these tests on the individual orders.    Comprehensive Metabolic Panel [[id number]]  (Abnormal) Collected: 11/01/24 1540    Specimen: Blood Updated: 11/01/24 1610     Glucose 147 mg/dL      BUN 12 mg/dL      Creatinine 0.92 mg/dL      Sodium 147 mmol/L      Potassium 3.0 mmol/L      Comment: Slight hemolysis detected by analyzer. Result may be falsely elevated.        Chloride 101 mmol/L      CO2 37.0 mmol/L      Calcium 9.4 mg/dL      Total Protein 6.9 g/dL      Albumin 4.2 g/dL      ALT (SGPT) 14 U/L      AST (SGOT) 21 U/L      Alkaline Phosphatase 76 U/L      Total Bilirubin 1.4 mg/dL      Globulin 2.7 gm/dL      A/G Ratio 1.6 g/dL      BUN/Creatinine Ratio 13.0     Anion Gap 9.0 mmol/L      eGFR 57.5 mL/min/1.73     Narrative:      GFR Normal >60  Chronic Kidney Disease <60  Kidney Failure <15    The GFR formula is only valid for adults with stable renal function between ages 18 and 70.    BNP [[id number]]  (Abnormal) Collected: 11/01/24 1540    Specimen: Blood Updated: 11/01/24 1606     proBNP 3,121.0 pg/mL     Narrative:      This assay is used as an aid in the diagnosis of individuals suspected of having heart failure. It can be used as an aid in the diagnosis of acute decompensated heart failure (ADHF) in patients presenting with signs and symptoms of ADHF to the emergency department (ED). In addition, NT-proBNP of <300 pg/mL indicates ADHF is not likely.    Age Range Result Interpretation  NT-proBNP Concentration (pg/mL:      <50             Positive            >450                   Gray                 300-450                    Negative             <300    50-75           Positive            >900                  Gray                300-900                  Negative            <300      >75             Positive            >1800                  Gray                300-1800                  Negative             <300    Single High Sensitivity Troponin T [[id number]]  (Abnormal) Collected: 11/01/24 1540    Specimen: Blood Updated: 11/01/24 1610     HS Troponin T 32 ng/L     Narrative:      High Sensitive Troponin T Reference Range:  <14.0 ng/L- Negative Female for AMI  <22.0 ng/L- Negative Male for AMI  >=14 - Abnormal Female indicating possible myocardial injury.  >=22 - Abnormal Male indicating possible myocardial injury.   Clinicians would have to utilize clinical acumen, EKG, Troponin, and serial changes to determine if it is an Acute Myocardial Infarction or myocardial injury due to an underlying chronic condition.         CBC Auto Differential [[id number]]  (Abnormal) Collected: 11/01/24 1540    Specimen: Blood Updated: 11/01/24 1547     WBC 6.49 10*3/mm3      RBC 4.01 10*6/mm3      Hemoglobin 12.1 g/dL      Hematocrit 39.8 %      MCV 99.3 fL      MCH 30.2 pg      MCHC 30.4 g/dL      RDW 14.6 %      RDW-SD 53.7 fl      MPV 11.6 fL      Platelets 146 10*3/mm3      Neutrophil % 73.6 %      Lymphocyte % 16.3 %      Monocyte % 8.8 %      Eosinophil % 0.5 %      Basophil % 0.5 %      Immature Grans % 0.3 %      Neutrophils, Absolute 4.78 10*3/mm3      Lymphocytes, Absolute 1.06 10*3/mm3      Monocytes, Absolute 0.57 10*3/mm3      Eosinophils, Absolute 0.03 10*3/mm3      Basophils, Absolute 0.03 10*3/mm3      Immature Grans, Absolute 0.02 10*3/mm3      nRBC 0.0 /100 WBC     Magnesium [[id number]] Collected: 11/01/24 1540    Specimen: Blood Updated: 11/01/24 1655    COVID-19, FLU A/B, RSV PCR 1 HR TAT - Swab, Nasopharynx [[id number]]  (Normal) Collected: 11/01/24 1552    Specimen: Swab from Nasopharynx Updated: 11/01/24 1635     COVID19 Not Detected     Influenza A PCR Not Detected     Influenza B PCR Not Detected     RSV, PCR Not Detected    Narrative:      Fact sheet for providers: https://www.fda.gov/media/222090/download    Fact sheet for patients: https://www.fda.gov/media/493399/download    Test performed by PCR.              Imaging:    XR Chest 1 View    Result Date: 11/1/2024  XR CHEST 1 VW Date of Exam: 11/1/2024 4:03 PM EDT Indication: SOA Triage Protocol Comparison: None available. Findings: Mediastinum: Cardiomediastinal silhouette appears enlarged Lungs: Right basal opacities. Likely calcified granulomas. Pleura: Likely small right pleural effusion. No left pleural effusion or pneumothorax. Bones and soft tissues: No acute osseous abnormality.     Impression: Cardiomegaly suggestive of cardiomyopathy and/or pericardial effusion. Likely small right pleural effusion with adjacent atelectasis and/or pneumonia. Electronically Signed: Kyle Padilla  11/1/2024 4:31 PM EDT  Workstation ID: [id number]       Differential Diagnosis and Discussion:      Dyspnea: Differential diagnosis includes but is not limited to metabolic acidosis, neurological disorders, psychogenic, asthma, pneumothorax, upper airway obstruction, COPD, pneumonia, noncardiogenic pulmonary edema, interstitial lung disease, anemia, congestive heart failure, and pulmonary embolism    All labs were reviewed and interpreted by me.  All X-rays impressions were independently interpreted by me.  EKG was interpreted by me.    MDM  Patient required IV labetalol to emergently treat her blood pressure.  She also received IV Lasix patient was placed chest with heart failure.  Patient will be admitted to the hospital service.      Total Critical Care time of 40 minutes. Total critical care time documented does not include time spent on separately billed procedures for services of nurses or physician assistants. I personally saw and examined the patient. I have reviewed all diagnostic interpretations and treatment plans as written. I was present for the key portions of any procedures performed and the inclusive time noted in any critical care statement. Critical care time includes patient management by me, time spent at the patients bedside,  time to review lab and imaging  results, discussing patient care, documentation in the medical record, and time spent with family or caregiver.          Patient Care Considerations:    SEPSIS was considered but is NOT present in the emergency department as SIRS criteria is not present.      Consultants/Shared Management Plan:    Hospitalist: I have discussed the case with Dr. Barnard who agrees to accept the patient for admission.    Social Determinants of Health:    Patient has presented with family members who are responsible, reliable and will ensure follow up care.      Disposition and Care Coordination:    Admit:   Through independent evaluation of the patient's history, physical, and imperical data, the patient meets criteria for inpatient admission to the hospital.      Final diagnoses:   Acute congestive heart failure, unspecified heart failure type        ED Disposition       ED Disposition   Decision to Admit    Condition   --    Comment   --               This medical record created using voice recognition software.             Rosendo Liriano DO  11/01/24 [id number]

## 2024-11-02 ENCOUNTER — APPOINTMENT (OUTPATIENT)
Dept: CARDIOLOGY | Facility: HOSPITAL | Age: 89
DRG: 291 | End: 2024-11-02
Payer: MEDICARE

## 2024-11-02 LAB
ALBUMIN SERPL-MCNC: 3.8 G/DL (ref 3.5–5.2)
ALBUMIN/GLOB SERPL: 1.5 G/DL
ALP SERPL-CCNC: 71 U/L (ref 39–117)
ALT SERPL W P-5'-P-CCNC: 12 U/L (ref 1–33)
ANION GAP SERPL CALCULATED.3IONS-SCNC: 6.5 MMOL/L (ref 5–15)
AST SERPL-CCNC: 20 U/L (ref 1–32)
BASOPHILS # BLD AUTO: 0.02 10*3/MM3 (ref 0–0.2)
BASOPHILS NFR BLD AUTO: 0.3 % (ref 0–1.5)
BILIRUB SERPL-MCNC: 1.5 MG/DL (ref 0–1.2)
BUN SERPL-MCNC: 11 MG/DL (ref 8–23)
BUN/CREAT SERPL: 14.3 (ref 7–25)
CALCIUM SPEC-SCNC: 8.7 MG/DL (ref 8.2–9.6)
CHLORIDE SERPL-SCNC: 98 MMOL/L (ref 98–107)
CO2 SERPL-SCNC: 40.5 MMOL/L (ref 22–29)
CREAT SERPL-MCNC: 0.77 MG/DL (ref 0.57–1)
DEPRECATED RDW RBC AUTO: 53.1 FL (ref 37–54)
EGFRCR SERPLBLD CKD-EPI 2021: 71.1 ML/MIN/1.73
EOSINOPHIL # BLD AUTO: 0.07 10*3/MM3 (ref 0–0.4)
EOSINOPHIL NFR BLD AUTO: 1 % (ref 0.3–6.2)
ERYTHROCYTE [DISTWIDTH] IN BLOOD BY AUTOMATED COUNT: 14.6 % (ref 12.3–15.4)
GLOBULIN UR ELPH-MCNC: 2.5 GM/DL
GLUCOSE SERPL-MCNC: 129 MG/DL (ref 65–99)
HCT VFR BLD AUTO: 39.2 % (ref 34–46.6)
HGB BLD-MCNC: 11.7 G/DL (ref 12–15.9)
IMM GRANULOCYTES # BLD AUTO: 0.02 10*3/MM3 (ref 0–0.05)
IMM GRANULOCYTES NFR BLD AUTO: 0.3 % (ref 0–0.5)
LYMPHOCYTES # BLD AUTO: 1.5 10*3/MM3 (ref 0.7–3.1)
LYMPHOCYTES NFR BLD AUTO: 20.4 % (ref 19.6–45.3)
MAGNESIUM SERPL-MCNC: 1.8 MG/DL (ref 1.7–2.3)
MCH RBC QN AUTO: 29.5 PG (ref 26.6–33)
MCHC RBC AUTO-ENTMCNC: 29.8 G/DL (ref 31.5–35.7)
MCV RBC AUTO: 99 FL (ref 79–97)
MONOCYTES # BLD AUTO: 0.74 10*3/MM3 (ref 0.1–0.9)
MONOCYTES NFR BLD AUTO: 10.1 % (ref 5–12)
NEUTROPHILS NFR BLD AUTO: 4.99 10*3/MM3 (ref 1.7–7)
NEUTROPHILS NFR BLD AUTO: 67.9 % (ref 42.7–76)
NRBC BLD AUTO-RTO: 0 /100 WBC (ref 0–0.2)
PLATELET # BLD AUTO: 142 10*3/MM3 (ref 140–450)
PMV BLD AUTO: 11.6 FL (ref 6–12)
POTASSIUM SERPL-SCNC: 2.7 MMOL/L (ref 3.5–5.2)
POTASSIUM SERPL-SCNC: 3.9 MMOL/L (ref 3.5–5.2)
PROT SERPL-MCNC: 6.3 G/DL (ref 6–8.5)
QTC INTERVAL: NORMAL MS
RBC # BLD AUTO: 3.96 10*6/MM3 (ref 3.77–5.28)
SODIUM SERPL-SCNC: 145 MMOL/L (ref 136–145)
WBC NRBC COR # BLD AUTO: 7.34 10*3/MM3 (ref 3.4–10.8)

## 2024-11-02 PROCEDURE — 25010000002 FUROSEMIDE PER 20 MG: Performed by: INTERNAL MEDICINE

## 2024-11-02 PROCEDURE — 99222 1ST HOSP IP/OBS MODERATE 55: CPT | Performed by: STUDENT IN AN ORGANIZED HEALTH CARE EDUCATION/TRAINING PROGRAM

## 2024-11-02 PROCEDURE — 25010000002 ENOXAPARIN PER 10 MG: Performed by: INTERNAL MEDICINE

## 2024-11-02 PROCEDURE — 83735 ASSAY OF MAGNESIUM: CPT | Performed by: INTERNAL MEDICINE

## 2024-11-02 PROCEDURE — 36415 COLL VENOUS BLD VENIPUNCTURE: CPT | Performed by: INTERNAL MEDICINE

## 2024-11-02 PROCEDURE — 93306 TTE W/DOPPLER COMPLETE: CPT

## 2024-11-02 PROCEDURE — 25010000002 FUROSEMIDE PER 20 MG: Performed by: STUDENT IN AN ORGANIZED HEALTH CARE EDUCATION/TRAINING PROGRAM

## 2024-11-02 PROCEDURE — 85025 COMPLETE CBC W/AUTO DIFF WBC: CPT | Performed by: INTERNAL MEDICINE

## 2024-11-02 PROCEDURE — 93306 TTE W/DOPPLER COMPLETE: CPT | Performed by: STUDENT IN AN ORGANIZED HEALTH CARE EDUCATION/TRAINING PROGRAM

## 2024-11-02 PROCEDURE — 80053 COMPREHEN METABOLIC PANEL: CPT | Performed by: INTERNAL MEDICINE

## 2024-11-02 PROCEDURE — 99232 SBSQ HOSP IP/OBS MODERATE 35: CPT | Performed by: INTERNAL MEDICINE

## 2024-11-02 PROCEDURE — 84132 ASSAY OF SERUM POTASSIUM: CPT | Performed by: INTERNAL MEDICINE

## 2024-11-02 RX ORDER — LEVOTHYROXINE SODIUM 50 UG/1
50 TABLET ORAL
Status: DISCONTINUED | OUTPATIENT
Start: 2024-11-02 | End: 2024-11-08 | Stop reason: HOSPADM

## 2024-11-02 RX ORDER — POTASSIUM CHLORIDE 1500 MG/1
40 TABLET, EXTENDED RELEASE ORAL EVERY 4 HOURS
Status: COMPLETED | OUTPATIENT
Start: 2024-11-02 | End: 2024-11-02

## 2024-11-02 RX ORDER — LOSARTAN POTASSIUM 25 MG/1
25 TABLET ORAL
Status: DISCONTINUED | OUTPATIENT
Start: 2024-11-02 | End: 2024-11-03

## 2024-11-02 RX ORDER — FUROSEMIDE 10 MG/ML
40 INJECTION INTRAMUSCULAR; INTRAVENOUS EVERY 8 HOURS
Status: DISCONTINUED | OUTPATIENT
Start: 2024-11-02 | End: 2024-11-04

## 2024-11-02 RX ADMIN — Medication 10 ML: at 20:23

## 2024-11-02 RX ADMIN — POTASSIUM CHLORIDE 40 MEQ: 1500 TABLET, EXTENDED RELEASE ORAL at 13:53

## 2024-11-02 RX ADMIN — ENOXAPARIN SODIUM 40 MG: 100 INJECTION SUBCUTANEOUS at 09:00

## 2024-11-02 RX ADMIN — POTASSIUM CHLORIDE 40 MEQ: 1500 TABLET, EXTENDED RELEASE ORAL at 05:43

## 2024-11-02 RX ADMIN — POTASSIUM CHLORIDE 40 MEQ: 1500 TABLET, EXTENDED RELEASE ORAL at 11:05

## 2024-11-02 RX ADMIN — FUROSEMIDE 40 MG: 10 INJECTION, SOLUTION INTRAMUSCULAR; INTRAVENOUS at 09:00

## 2024-11-02 RX ADMIN — LEVOTHYROXINE SODIUM 50 MCG: 50 TABLET ORAL at 09:00

## 2024-11-02 RX ADMIN — Medication 10 ML: at 09:01

## 2024-11-02 RX ADMIN — LOSARTAN POTASSIUM 25 MG: 25 TABLET, FILM COATED ORAL at 13:53

## 2024-11-02 RX ADMIN — FUROSEMIDE 40 MG: 10 INJECTION, SOLUTION INTRAMUSCULAR; INTRAVENOUS at 17:50

## 2024-11-02 NOTE — PLAN OF CARE
Goal Outcome Evaluation:  Plan of Care Reviewed With: patient, family        Progress: no change  Outcome Evaluation: The patient has had been diuresed with lasix. Echo was completed.  Hr has been in 40-50's nonsymptomatic.  Pallative consult was ordered.  Care plan is ongoing.

## 2024-11-02 NOTE — PROGRESS NOTES
[facility]   Hospitalist Progress Note  Date: 2024  Patient Name: Loly Terrazas  : 6/10/1929  MRN: [id number]  Date of admission: 2024  Room/Bed: 260/1      Subjective   Subjective     Chief Complaint: short of air     Summary:Loly Terrazas is a 89 y.o. female  with past medical history of COPD, hypertension, CVA, and hypothyroidism who presents with shortness of breath for a month  The patient's history was obtained from her and her daughters at bedside.  Patient's had some leg swelling and some shortness of breath for about 1 month.  However over the last 1 to 2 weeks has gotten progressively worse.  She has orthopnea.  She has PND.  Worsening lower extremity edema.  In the emergency department the patient's vital signs are as follows: Temperature  98.1, pulse 51, respiratory rate was 20, blood pressure 220/66, 85% on room air.  CBC shows no acute abnormalities.  CMP shows normal creatinine.  Sodium is 147.  BNP is 3000 with previous BNP of 419 and ..  Chest x-ray shows cardiomegaly suggestive cardiomyopathy and/or pericardial effusion.  Likely small right pleural effusion with adjacent atelectasis and/or pneumonia.  Patient was given potassium.  She is given 40 of IV Lasix.  Patient's heart failure symptoms are most likely due to diastolic dysfunction from severe hypertension.  She was given Nitropaste if this does not reduce her pressure effectively they will need to started on nitroglycerin drip.  Patient to the hospital for heart failure with unknown EF due to hypertensive emergency causing acute hypoxic respiratory failure.    Interval Followup:   Patient remains admitted to the hospital.  Patient was started on IV diuresis  Patient continues to state that she feels short of breath.  Patient's daughter states that she is not very mobile at baseline  Patient's daughter states that she is very stubborn with medical care and will refuse to take her medications or forget to take them and  will refuse to have any test done    Patient's blood pressure was extremely elevated on admission but currently is controlled at 147/62.  Patient's pulse is bradycardic in the 40s and 50s  Patient remains on 2 L of oxygen      Review of Systems    All systems reviewed and negative except for what is outlined above.      Objective   Objective     Vitals:   Temp:  [97.7 °F (36.5 °C)-98.1 °F (36.7 °C)] 98 °F (36.7 °C)  Heart Rate:  [47-63] 48  Resp:  [16-22] 16  BP: (146-225)/(48-98) 179/48  Flow (L/min) (Oxygen Therapy):  [2] 2    Physical Exam   General: Awake, alert, NAD.  Patient is resting in bed with family at the bedside  HENT: NCAT, MMM  Eyes: pupils equal, no scleral icterus  Cardiovascular: RRR, no murmurs.  Lower extremity edema noted  Pulmonary: Decreased, no wheezing  Gastrointestinal: S/ND/NT, +BS  Musculoskeletal: No gross deformities  Skin: No jaundice, no rash on exposed skin appreciated  Neuro: CN II through XII grossly intact; speech clear; no tremor  Psych: Mood and affect appropriate  : No Layne catheter; no suprapubic tenderness    Result Review    Result Review:  I have personally reviewed these results:  [x]  Laboratory      Lab 11/02/24  0406 11/01/24  1540   WBC 7.34 6.49   HEMOGLOBIN 11.7* 12.1   HEMATOCRIT 39.2 39.8   PLATELETS 142 146   NEUTROS ABS 4.99 4.78   IMMATURE GRANS (ABS) 0.02 0.02   LYMPHS ABS 1.50 1.06   MONOS ABS 0.74 0.57   EOS ABS 0.07 0.03   MCV 99.0* 99.3*         Lab 11/02/24  0406 11/01/24  1540   SODIUM 145 147*   POTASSIUM 2.7* 3.0*   CHLORIDE 98 101   CO2 40.5* 37.0*   ANION GAP 6.5 9.0   BUN 11 12   CREATININE 0.77 0.92   EGFR 71.1 57.5*   GLUCOSE 129* 147*   CALCIUM 8.7 9.4   MAGNESIUM 1.8 1.6*   TSH  --  2.060         Lab 11/02/24  0406 11/01/24  1540   TOTAL PROTEIN 6.3 6.9   ALBUMIN 3.8 4.2   GLOBULIN 2.5 2.7   ALT (SGPT) 12 14   AST (SGOT) 20 21   BILIRUBIN 1.5* 1.4*   ALK PHOS 71 76         Lab 11/01/24  1540   PROBNP 3,121.0*   HSTROP T 32*                  Brief Urine Lab Results       None          [x]  Microbiology   Microbiology Results (last 10 days)       Procedure Component Value - Date/Time    COVID-19, FLU A/B, RSV PCR 1 HR TAT - Swab, Nasopharynx [[id number]]  (Normal) Collected: 11/01/24 1552    Lab Status: Final result Specimen: Swab from Nasopharynx Updated: 11/01/24 1635     COVID19 Not Detected     Influenza A PCR Not Detected     Influenza B PCR Not Detected     RSV, PCR Not Detected    Narrative:      Fact sheet for providers: https://www.fda.gov/media/285277/download    Fact sheet for patients: https://www.fda.gov/media/620545/download    Test performed by PCR.          [x]  Radiology  XR Chest 1 View    Result Date: 11/1/2024  Impression: Cardiomegaly suggestive of cardiomyopathy and/or pericardial effusion. Likely small right pleural effusion with adjacent atelectasis and/or pneumonia. Electronically Signed: Kyle Padilla  11/1/2024 4:31 PM EDT  Workstation ID: [id number]   []  EKG/Telemetry   []  Cardiology/Vascular   []  Pathology  []  Old records  []  Other:    Assessment & Plan   Assessment / Plan     Assessment:  Acute exacerbation of congestive heart failure, unknown what kind since patient has no EF on file  Advanced age with debility  Hypertension  Hypothyroidism    Plan:  Patient remains admitted to the medicine service  Continue patient on IV diuresis  Continue to wean oxygen as tolerated  Replace electrolytes as indicated  Patient has been started on losartan  Cardiology recommends to avoid AV juan blocking agents due to sinus node dysfunction  Echocardiogram is pending  Patient's daughter has asked for palliative care consult to see what services they might be able to offer  Repeat laboratory data in the morning  Discussed plan with patient and with nurse       Discussed with RN.    VTE Prophylaxis:  Pharmacologic VTE prophylaxis orders are present.        CODE STATUS:   Medical Intervention Limits: No intubation (DNI)  Code Status  (Patient has no pulse and is not breathing): No CPR (Do Not Attempt to Resuscitate)  Medical Interventions (Patient has pulse or is breathing): Limited Support      Electronically signed by Milton Mueller DO, 11/2/2024, 07:06 EDT.

## 2024-11-02 NOTE — CONSULTS
Cardiology Consult Note  [address]          Patient Identification:  Loly Terrazas      [id number]  89 y.o.        female  6/10/1929       Reason for Consultation: CHF     PCP: Keisha Sullivan APRN    History of Present Illness:  Ms. Monzon is a 89-year-old frail female who has a past medical history of HTN, COPD, CKD, SND, hearing impairment who presents to the hospital with a complaint of shortness of breath.    She is accompanied by her daughter today.  She lives by herself next to her daughter's apartment in her own apartment unit.  This history was contributed by her daughter.  Patient is a [employer].  She ambulates with the help of a walker and is dependent for her care somewhat on her daughter.  Months ago, daughter reports that patient has been progressively shortness of breath, which is now occurring with minimal exertion, finally convincing her mother to bring her to ER for evaluation.    In the ER, she was noted to be hypertensive.  She was initiated on nitro drip for appropriate blood pressure control.  Initial imaging showed cardiomegaly with a concern for both pleural and pericardial effusion.  Initiated on diuresis, cardiology team was consulted for further management.    She stated that her NP was informed about this and they were going to see her this week in the clinic but they could not wait for that and hence seek immediate medical attention.  Patient has resisted workup for her medical condition in the past which has delayed her medical care significantly.  Today, she reports exertional dyspnea which is now occurring at rest associated with orthopnea, PND and peripheral edema.  Daughter also added that she has complained about chest tightness when not able to breathe.    Otherwise, she has no prior history of MI.  She is undecided if she will take medications.  She denies a history of smoking.        Past History:  Past Medical History:   Diagnosis Date     Allergic rhinitis due to allergen 2018    Anemia 2019    Anxiety disorder 10/26/2020    Arthritis     COPD (chronic obstructive pulmonary disease) 2018    DDD (degenerative disc disease), lumbar 2019    Deafness     Dyspnea 2019    Essential hypertension 2018    Fall 2019    SEAN (generalized anxiety disorder) 2020    Gait disturbance 2018    History of stroke 2018    Hypothyroidism 2018    Insomnia 2019    Joint pain 10/26/2020    Low hemoglobin 2019    Lumbago 2019    Lumbar herniated disc     L5-S1    Memory change 2018    Renal insufficiency 10/26/2020    Scoliosis     Sinus node dysfunction     Statin intolerance 2019    Tremor     Vein disorder 2018     Past Surgical History:   Procedure Laterality Date    CHOLECYSTECTOMY      LEG SURGERY Right     Fracture with pins    THYROIDECTOMY       Allergies   Allergen Reactions    Pneumococcal Vac Polyvalent Swelling     Social History     Socioeconomic History    Marital status:    Tobacco Use    Smoking status: Former     Current packs/day: 0.00     Average packs/day: 0.3 packs/day for 30.0 years (7.5 ttl pk-yrs)     Types: Cigarettes     Start date:      Quit date:      Years since quittin.8    Smokeless tobacco: Never   Vaping Use    Vaping status: Never Used   Substance and Sexual Activity    Alcohol use: Yes     Alcohol/week: 1.0 standard drink of alcohol     Types: 1 Glasses of wine per week     Comment: 1 drink per day     Drug use: Never    Sexual activity: Not Currently     History reviewed. No pertinent family history.    Medications:  Prior to Admission medications    Medication Sig Start Date End Date Taking? Authorizing Provider   atenolol (TENORMIN) 50 MG tablet Take 1 tablet by mouth Every Morning. 1 tab in the am 24  Yes Keisha Slulivan APRN   furosemide (Lasix) 20 MG tablet Take 1 tablet by mouth Daily.  Patient taking  "differently: Take 1 tablet by mouth Daily. TAKE FOR 5 DAYS STARTING 10/29/2024 10/29/24  Yes Keisha Sullivan APRN   levothyroxine (SYNTHROID, LEVOTHROID) 50 MCG tablet Take 1 tablet by mouth Daily. 6/6/24  Yes Keisha Sullivan APRN   meclizine (ANTIVERT) 25 MG tablet Take 1 tablet by mouth 3 (Three) Times a Day As Needed for Dizziness. 9/5/24  Yes Keisha Sullivan APRN   mirtazapine (REMERON) 7.5 MG tablet Take 1 tablet by mouth Every Night. 9/5/24  Yes Keisha Sullivan APRN   vitamin B-12 (CYANOCOBALAMIN) 1000 MCG tablet Take 1 tablet by mouth Daily. 9/5/24  Yes Keisha Sullivan APRN      Current medications:  enoxaparin, 40 mg, Subcutaneous, Daily  furosemide, 40 mg, Intravenous, BID  levothyroxine, 50 mcg, Oral, Q AM  potassium chloride ER, 40 mEq, Oral, Q4H  sodium chloride, 10 mL, Intravenous, Q12H      Current IV drips:  nitroglycerin, 5-200 mcg/min, Last Rate: Stopped (11/01/24 1839)              Physical Exam    /62 (BP Location: Left arm, Patient Position: Sitting)   Pulse 56   Temp 98.1 °F (36.7 °C) (Oral)   Resp 16   Ht 157.5 cm (62\")   Wt 67 kg (147 lb 11.3 oz)   SpO2 100%   BMI 27.02 kg/m²  Body mass index is 27.02 kg/m².   Oxygen saturation   @FLOWAN(10::1)@ SpO2  Min: 85 %  Max: 100 %    Constitutional:  Awake. Not in acute distress. Normal appearance.   Neck: No carotid bruit, hepatojugular reflux or JVD.   Cardiovascular:      Rate and Rhythm: Normal rate and regular rhythm.      Chest Wall: PMI is not displaced.      Heart sounds: Normal heart sounds, S1 normal and S2 normal. No murmur heard.       No friction rub. No gallop. No S3 or S4 sounds.    Pulmonary: Pulmonary effort is normal. Normal breath sounds. No wheezing, rhonchi or rales.   Extremities: No Bilateral edema is noted.   Skin: Warm and dry. Non cyanotic, No petechiae or rash.   Neurological: Alert and oriented x 3    Cardiographics:     ECG  (personally reviewed) sinus " "bradycardia   Telemetry:  (personally reviewed) intermittent sinus pauses with sinus bradycardia          No results found for this or any previous visit.      Cardiolite (Tc-99m Sestamibi) stress test   Lab Review:       CBC          9/5/2024    14:36 11/1/2024    15:40 11/2/2024    04:06   CBC   WBC 6.12  6.49  7.34    RBC 3.90  4.01  3.96    Hemoglobin 11.8  12.1  11.7    Hematocrit 36.5  39.8  39.2    MCV 93.6  99.3  99.0    MCH 30.3  30.2  29.5    MCHC 32.3  30.4  29.8    RDW 12.8  14.6  14.6    Platelets 192  146  142        CMP          9/5/2024    14:36 11/1/2024    15:40 11/2/2024    04:06   CMP   Glucose 88  147  129    BUN 14  12  11    Creatinine 0.81  0.92  0.77    EGFR 66.9  57.5  71.1    Sodium 141  147  145    Potassium 3.8  3.0  2.7    Chloride 105  101  98    Calcium 9.7  9.4  8.7    Total Protein 7.2  6.9  6.3    Albumin 4.5  4.2  3.8    Globulin 2.7  2.7  2.5    Total Bilirubin 0.6  1.4  1.5    Alkaline Phosphatase 81  76  71    AST (SGOT) 20  21  20    ALT (SGPT) 12  14  12    Albumin/Globulin Ratio 1.7  1.6  1.5    BUN/Creatinine Ratio 17.3  13.0  14.3    Anion Gap 7.4  9.0  6.5         CARDIAC LABS:      Lab 11/01/24  1540   PROBNP 3,121.0*   HSTROP T 32*      No results found for: \"DIGOXIN\"   Lab Results   Component Value Date    TSH 2.060 11/01/2024           Invalid input(s): \"LDLCALC\"  No results found for: \"POCTROP\"  No results found for: \"DDIMERQUAN\"  Lab Results   Component Value Date    MG 1.8 11/02/2024             CARDIAC LABS:      Lab 11/01/24  1540   PROBNP 3,121.0*   HSTROP T 32*        Imaging:  CXR  Pulmonary edema  Pleural effusion  Cardiomegaly   Assessment:  Congestive heart failure  Hypertensive urgency  Sinus node dysfunction  History of COPD      Plan:  Patient's EKG and telemetry was reviewed.  Patient is frail and ambulates with the help of walker at home.  She is undecided currently if she wants to continue medication or let the disease takes it natural course.  "     Patient's history of medication noncompliance and resisting to undergo workup is a barrier for initiation of GDMT.  This was explained to the daughter.  They will have a discussion with the family members and then let us know if they want to pursue further medical management, until then;    Continue diuresis with Lasix 40 mg IV every 8 hours.  Replace potassium and magnesium to keep it above 4 and 2 respectively to minimize ventricular arrhythmias  Due to sinus node dysfunction, will not administer AV juan blocking agents.  Started losartan 25 mg p.o. daily.    Continue to watch UOP and creatinine.  Weigh patient daily    PT/OT consulted for evaluation.    Thank you for allowing us to share in [facility]. Please call with any questions or concerns.             Kevin Oh MD   11/2/2024    12:52 EDT

## 2024-11-03 LAB
ANION GAP SERPL CALCULATED.3IONS-SCNC: 5.7 MMOL/L (ref 5–15)
BH CV ECHO MEAS - AI P1/2T: 1454 MSEC
BH CV ECHO MEAS - AO MAX PG: 12.5 MMHG
BH CV ECHO MEAS - AO MEAN PG: 6.8 MMHG
BH CV ECHO MEAS - AO ROOT DIAM: 2.5 CM
BH CV ECHO MEAS - AO V2 MAX: 177 CM/SEC
BH CV ECHO MEAS - AO V2 VTI: 45 CM
BH CV ECHO MEAS - AVA(I,D): 1.82 CM2
BH CV ECHO MEAS - EDV(CUBED): 37.8 ML
BH CV ECHO MEAS - EDV(MOD-SP2): 79.4 ML
BH CV ECHO MEAS - EDV(MOD-SP4): 42.7 ML
BH CV ECHO MEAS - EF(MOD-BP): 78.5 %
BH CV ECHO MEAS - EF(MOD-SP2): 80.9 %
BH CV ECHO MEAS - EF(MOD-SP4): 74.9 %
BH CV ECHO MEAS - ESV(CUBED): 7.7 ML
BH CV ECHO MEAS - ESV(MOD-SP2): 15.2 ML
BH CV ECHO MEAS - ESV(MOD-SP4): 10.7 ML
BH CV ECHO MEAS - FS: 41.2 %
BH CV ECHO MEAS - IVS/LVPW: 1.03 CM
BH CV ECHO MEAS - IVSD: 1.07 CM
BH CV ECHO MEAS - LA DIMENSION: 4.1 CM
BH CV ECHO MEAS - LAT PEAK E' VEL: 10.1 CM/SEC
BH CV ECHO MEAS - LV DIASTOLIC VOL/BSA (35-75): 25.5 CM2
BH CV ECHO MEAS - LV MASS(C)D: 105.2 GRAMS
BH CV ECHO MEAS - LV MAX PG: 7.7 MMHG
BH CV ECHO MEAS - LV MEAN PG: 4 MMHG
BH CV ECHO MEAS - LV SYSTOLIC VOL/BSA (12-30): 6.4 CM2
BH CV ECHO MEAS - LV V1 MAX: 138.7 CM/SEC
BH CV ECHO MEAS - LV V1 VTI: 32.7 CM
BH CV ECHO MEAS - LVIDD: 3.4 CM
BH CV ECHO MEAS - LVIDS: 1.97 CM
BH CV ECHO MEAS - LVOT AREA: 2.5 CM2
BH CV ECHO MEAS - LVOT DIAM: 1.79 CM
BH CV ECHO MEAS - LVPWD: 1.04 CM
BH CV ECHO MEAS - MED PEAK E' VEL: 4 CM/SEC
BH CV ECHO MEAS - MV A MAX VEL: 139.4 CM/SEC
BH CV ECHO MEAS - MV DEC SLOPE: 308.5 CM/SEC2
BH CV ECHO MEAS - MV DEC TIME: 0.38 SEC
BH CV ECHO MEAS - MV E MAX VEL: 118 CM/SEC
BH CV ECHO MEAS - MV E/A: 0.85
BH CV ECHO MEAS - MV MAX PG: 7.2 MMHG
BH CV ECHO MEAS - MV MEAN PG: 4.2 MMHG
BH CV ECHO MEAS - MV V2 VTI: 39.4 CM
BH CV ECHO MEAS - MVA(VTI): 2.08 CM2
BH CV ECHO MEAS - RVDD: 3.4 CM
BH CV ECHO MEAS - SV(LVOT): 81.9 ML
BH CV ECHO MEAS - SV(MOD-SP2): 64.2 ML
BH CV ECHO MEAS - SV(MOD-SP4): 32 ML
BH CV ECHO MEAS - SVI(LVOT): 48.8 ML/M2
BH CV ECHO MEAS - SVI(MOD-SP2): 38.3 ML/M2
BH CV ECHO MEAS - SVI(MOD-SP4): 19.1 ML/M2
BH CV ECHO MEAS - TAPSE (>1.6): 1.75 CM
BH CV ECHO MEAS - TR MAX PG: 40.4 MMHG
BH CV ECHO MEAS - TR MAX VEL: 317.6 CM/SEC
BH CV ECHO MEASUREMENTS AVERAGE E/E' RATIO: 16.74
BUN SERPL-MCNC: 16 MG/DL (ref 8–23)
BUN/CREAT SERPL: 16.8 (ref 7–25)
CALCIUM SPEC-SCNC: 9.5 MG/DL (ref 8.2–9.6)
CHLORIDE SERPL-SCNC: 96 MMOL/L (ref 98–107)
CO2 SERPL-SCNC: 42.3 MMOL/L (ref 22–29)
CREAT SERPL-MCNC: 0.95 MG/DL (ref 0.57–1)
EGFRCR SERPLBLD CKD-EPI 2021: 55.3 ML/MIN/1.73
GLUCOSE SERPL-MCNC: 105 MG/DL (ref 65–99)
HBA1C MFR BLD: 5.5 % (ref 4.8–5.6)
LEFT ATRIUM VOLUME INDEX: 42.1 ML/M2
MAGNESIUM SERPL-MCNC: 1.7 MG/DL (ref 1.7–2.3)
POTASSIUM SERPL-SCNC: 3.4 MMOL/L (ref 3.5–5.2)
POTASSIUM SERPL-SCNC: 4.4 MMOL/L (ref 3.5–5.2)
SODIUM SERPL-SCNC: 144 MMOL/L (ref 136–145)
WHOLE BLOOD HOLD SPECIMEN: NORMAL

## 2024-11-03 PROCEDURE — 84132 ASSAY OF SERUM POTASSIUM: CPT | Performed by: INTERNAL MEDICINE

## 2024-11-03 PROCEDURE — 25010000002 FUROSEMIDE PER 20 MG: Performed by: STUDENT IN AN ORGANIZED HEALTH CARE EDUCATION/TRAINING PROGRAM

## 2024-11-03 PROCEDURE — 25010000002 ENOXAPARIN PER 10 MG: Performed by: INTERNAL MEDICINE

## 2024-11-03 PROCEDURE — 80048 BASIC METABOLIC PNL TOTAL CA: CPT | Performed by: INTERNAL MEDICINE

## 2024-11-03 PROCEDURE — 99232 SBSQ HOSP IP/OBS MODERATE 35: CPT | Performed by: INTERNAL MEDICINE

## 2024-11-03 PROCEDURE — 99232 SBSQ HOSP IP/OBS MODERATE 35: CPT | Performed by: STUDENT IN AN ORGANIZED HEALTH CARE EDUCATION/TRAINING PROGRAM

## 2024-11-03 PROCEDURE — 83036 HEMOGLOBIN GLYCOSYLATED A1C: CPT | Performed by: INTERNAL MEDICINE

## 2024-11-03 PROCEDURE — 83735 ASSAY OF MAGNESIUM: CPT | Performed by: INTERNAL MEDICINE

## 2024-11-03 RX ORDER — LOSARTAN POTASSIUM 25 MG/1
25 TABLET ORAL
Status: DISCONTINUED | OUTPATIENT
Start: 2024-11-04 | End: 2024-11-08

## 2024-11-03 RX ORDER — SPIRONOLACTONE 25 MG/1
25 TABLET ORAL DAILY
Status: DISCONTINUED | OUTPATIENT
Start: 2024-11-03 | End: 2024-11-08 | Stop reason: HOSPADM

## 2024-11-03 RX ORDER — LOSARTAN POTASSIUM 50 MG/1
50 TABLET ORAL
Status: DISCONTINUED | OUTPATIENT
Start: 2024-11-04 | End: 2024-11-03

## 2024-11-03 RX ORDER — CALCIUM CARBONATE 500 MG/1
1 TABLET, CHEWABLE ORAL 3 TIMES DAILY PRN
Status: DISCONTINUED | OUTPATIENT
Start: 2024-11-03 | End: 2024-11-08 | Stop reason: HOSPADM

## 2024-11-03 RX ORDER — POTASSIUM CHLORIDE 750 MG/1
40 CAPSULE, EXTENDED RELEASE ORAL DAILY
Status: DISCONTINUED | OUTPATIENT
Start: 2024-11-03 | End: 2024-11-05

## 2024-11-03 RX ORDER — HYDRALAZINE HYDROCHLORIDE 25 MG/1
25 TABLET, FILM COATED ORAL EVERY 8 HOURS SCHEDULED
Status: DISCONTINUED | OUTPATIENT
Start: 2024-11-03 | End: 2024-11-05

## 2024-11-03 RX ORDER — POTASSIUM CHLORIDE 1.5 G/1.58G
40 POWDER, FOR SOLUTION ORAL EVERY 4 HOURS
Status: COMPLETED | OUTPATIENT
Start: 2024-11-03 | End: 2024-11-03

## 2024-11-03 RX ADMIN — ENOXAPARIN SODIUM 40 MG: 100 INJECTION SUBCUTANEOUS at 08:58

## 2024-11-03 RX ADMIN — HYDRALAZINE HYDROCHLORIDE 25 MG: 25 TABLET ORAL at 21:26

## 2024-11-03 RX ADMIN — POTASSIUM CHLORIDE 40 MEQ: 750 CAPSULE, EXTENDED RELEASE ORAL at 08:58

## 2024-11-03 RX ADMIN — Medication 10 ML: at 21:26

## 2024-11-03 RX ADMIN — LOSARTAN POTASSIUM 25 MG: 25 TABLET, FILM COATED ORAL at 08:58

## 2024-11-03 RX ADMIN — POTASSIUM CHLORIDE 40 MEQ: 1.5 POWDER, FOR SOLUTION ORAL at 08:58

## 2024-11-03 RX ADMIN — FUROSEMIDE 40 MG: 10 INJECTION, SOLUTION INTRAMUSCULAR; INTRAVENOUS at 18:01

## 2024-11-03 RX ADMIN — FUROSEMIDE 40 MG: 10 INJECTION, SOLUTION INTRAMUSCULAR; INTRAVENOUS at 01:00

## 2024-11-03 RX ADMIN — SPIRONOLACTONE 25 MG: 25 TABLET, FILM COATED ORAL at 15:50

## 2024-11-03 RX ADMIN — HYDRALAZINE HYDROCHLORIDE 25 MG: 25 TABLET ORAL at 15:50

## 2024-11-03 RX ADMIN — POTASSIUM CHLORIDE 40 MEQ: 1.5 POWDER, FOR SOLUTION ORAL at 13:02

## 2024-11-03 RX ADMIN — LEVOTHYROXINE SODIUM 50 MCG: 50 TABLET ORAL at 06:06

## 2024-11-03 RX ADMIN — FUROSEMIDE 40 MG: 10 INJECTION, SOLUTION INTRAMUSCULAR; INTRAVENOUS at 08:59

## 2024-11-03 RX ADMIN — Medication 10 ML: at 08:58

## 2024-11-03 NOTE — PROGRESS NOTES
[facility]   Hospitalist Progress Note  Date: 11/3/2024  Patient Name: Loly Terrazas  : 6/10/1929  MRN: [id number]  Date of admission: 2024  Room/Bed: 260/1      Subjective   Subjective     Chief Complaint: short of air     Summary:Loly Terrazas is a 89 y.o. female  with past medical history of COPD, hypertension, CVA, and hypothyroidism who presents with shortness of breath for a month  The patient's history was obtained from her and her daughters at bedside.  Patient's had some leg swelling and some shortness of breath for about 1 month.  However over the last 1 to 2 weeks has gotten progressively worse.  She has orthopnea.  She has PND.  Worsening lower extremity edema.  In the emergency department the patient's vital signs are as follows: Temperature  98.1, pulse 51, respiratory rate was 20, blood pressure 220/66, 85% on room air.  CBC shows no acute abnormalities.  CMP shows normal creatinine.  Sodium is 147.  BNP is 3000 with previous BNP of 419 and ..  Chest x-ray shows cardiomegaly suggestive cardiomyopathy and/or pericardial effusion.  Likely small right pleural effusion with adjacent atelectasis and/or pneumonia.  Patient was given potassium.  She is given 40 of IV Lasix.  Patient's heart failure symptoms are most likely due to diastolic dysfunction from severe hypertension.  She was given Nitropaste if this does not reduce her pressure effectively they will need to started on nitroglycerin drip.  Patient to the hospital for heart failure with unknown EF due to hypertensive emergency causing acute hypoxic respiratory failure.    Interval Followup:   Patient remains admitted to the hospital.   Patient continues to state that she feels short of breath.    Echocardiogram shows significant heart failure  Blood pressure remained stable at 147/53  Patient remains on 2 L of oxygen          Review of Systems    All systems reviewed and negative except for what is outlined above.      Objective    Objective     Vitals:   Temp:  [97.9 °F (36.6 °C)-98.8 °F (37.1 °C)] 98.8 °F (37.1 °C)  Heart Rate:  [56-72] 72  Resp:  [16-18] 18  BP: (122-181)/(46-62) 156/56  Flow (L/min) (Oxygen Therapy):  [2] 2    Physical Exam   General: Awake, alert, NAD.  Patient is resting in bed.  No family currently at bedside  HENT: NCAT, MMM  Eyes: pupils equal, no scleral icterus  Cardiovascular: RRR, systolic murmur noted.  Lower extremity edema noted  Pulmonary: Decreased, no wheezing  Gastrointestinal: S/ND/NT, +BS  Musculoskeletal: No gross deformities  Skin: No jaundice, no rash on exposed skin appreciated  Neuro: CN II through XII grossly intact; speech clear; no tremor  Psych: Mood and affect appropriate  : No Layne catheter; no suprapubic tenderness    Result Review    Result Review:  I have personally reviewed these results:  [x]  Laboratory      Lab 11/02/24  0406 11/01/24  1540   WBC 7.34 6.49   HEMOGLOBIN 11.7* 12.1   HEMATOCRIT 39.2 39.8   PLATELETS 142 146   NEUTROS ABS 4.99 4.78   IMMATURE GRANS (ABS) 0.02 0.02   LYMPHS ABS 1.50 1.06   MONOS ABS 0.74 0.57   EOS ABS 0.07 0.03   MCV 99.0* 99.3*         Lab 11/03/24  0413 11/02/24  1809 11/02/24  0406 11/01/24  1540   SODIUM 144  --  145 147*   POTASSIUM 3.4* 3.9 2.7* 3.0*   CHLORIDE 96*  --  98 101   CO2 42.3*  --  40.5* 37.0*   ANION GAP 5.7  --  6.5 9.0   BUN 16  --  11 12   CREATININE 0.95  --  0.77 0.92   EGFR 55.3*  --  71.1 57.5*   GLUCOSE 105*  --  129* 147*   CALCIUM 9.5  --  8.7 9.4   MAGNESIUM 1.7  --  1.8 1.6*   TSH  --   --   --  2.060         Lab 11/02/24  0406 11/01/24  1540   TOTAL PROTEIN 6.3 6.9   ALBUMIN 3.8 4.2   GLOBULIN 2.5 2.7   ALT (SGPT) 12 14   AST (SGOT) 20 21   BILIRUBIN 1.5* 1.4*   ALK PHOS 71 76         Lab 11/01/24  1540   PROBNP 3,121.0*   HSTROP T 32*                 Brief Urine Lab Results       None          [x]  Microbiology   Microbiology Results (last 10 days)       Procedure Component Value - Date/Time    COVID-19, FLU A/B, RSV  PCR 1 HR TAT - Swab, Nasopharynx [[id number]]  (Normal) Collected: 11/01/24 1552    Lab Status: Final result Specimen: Swab from Nasopharynx Updated: 11/01/24 1635     COVID19 Not Detected     Influenza A PCR Not Detected     Influenza B PCR Not Detected     RSV, PCR Not Detected    Narrative:      Fact sheet for providers: https://www.fda.gov/media/394939/download    Fact sheet for patients: https://www.fda.gov/media/364751/download    Test performed by PCR.          [x]  Radiology  XR Chest 1 View    Result Date: 11/1/2024  Impression: Cardiomegaly suggestive of cardiomyopathy and/or pericardial effusion. Likely small right pleural effusion with adjacent atelectasis and/or pneumonia. Electronically Signed: Kyle Padilla  11/1/2024 4:31 PM EDT  Workstation ID: [id number]   []  EKG/Telemetry   []  Cardiology/Vascular   []  Pathology  []  Old records  []  Other:    Assessment & Plan   Assessment / Plan     Assessment:  Acute exacerbation of diastolic congestive heart failure  Advanced age with debility  Hypertension  Hypothyroidism    Plan:  Patient remains admitted to the medicine service  Continue patient on IV diuresis  Continue to wean oxygen as tolerated  Replace electrolytes as indicated  Patient has been started on losartan  Cardiology recommends to avoid AV juan blocking agents due to sinus node dysfunction  Echocardiogram shows significant heart failure with Severe biatrial enlargement is present.  RV is dilated.  However LV has normal size.LV has mild concentric hypertrophy.  No regional wall motion abnormalities are noted.  LV systolic function is hyperdynamic.  Estimated LVEF is greater than 70%.  Near obliteration of LV cavity is present.  Diastolic function cannot be accurately graded in the presence of LVH.  At least moderately abnormal diastolic function is present  LVOT Doppler jet is symmetric.  Gradient is not concerning for LVOT obstruction.. Aortic valve is trileaflet.  Leaflets are calcified.   Mild aortic stenosis is noted.  Mild to moderate aortic regurgitation is present Mitral valve has thickened leaflets with preserved excursion.  MAC is present.  Mild to moderate MR is present Tricuspid valve is not well-visualized.  Moderate to severe TR is noted.  Calculated RVSP is 40 mmHg plus right atrial pressure.  Hepatic vein/IVC Doppler is not included in the study. Mild to moderate pulmonary regurgitation is present. Small pericardial effusion is noted  Patient's daughter has asked for palliative care consult to see what services they might be able to offer  Repeat laboratory data in the morning  Discussed plan with patient and with nurse       Discussed with RN.    VTE Prophylaxis:  Pharmacologic VTE prophylaxis orders are present.        CODE STATUS:   Medical Intervention Limits: No intubation (DNI)  Code Status (Patient has no pulse and is not breathing): No CPR (Do Not Attempt to Resuscitate)  Medical Interventions (Patient has pulse or is breathing): Limited Support      Electronically signed by Milton Mueller DO, 11/3/2024, 07:03 EST.

## 2024-11-03 NOTE — PLAN OF CARE
Goal Outcome Evaluation:  Plan of Care Reviewed With: patient           Outcome Evaluation: No complaints at this time. Call light in reach. Family at bedside during shift. No signs of distress noted. Ambulated in room with PCA. BM this shift. Patient care taken over approx. 1630.

## 2024-11-03 NOTE — PLAN OF CARE
Goal Outcome Evaluation:  Plan of Care Reviewed With: patient        Progress: no change     Pt alert and oriented x4. Lasix given and pt had good output. Pt HR dropped in 30's while she was sleeping but was asymptomatic. PA notified. Call light in reach

## 2024-11-03 NOTE — PROGRESS NOTES
[facility]  INPATIENT PROGRESS NOTE         [address]    11/3/2024      PATIENT IDENTIFICATION:   Name:  Loly Terrazas      MRN:  [id number]     89 y.o.  female               CC; SOB  SUBJECTIVE:   Feels better in terms of breathing.  Otherwise wants to come out of bed and ambulate.  OBJECTIVE:  Vitals:    11/02/24 2335 11/03/24 0330 11/03/24 0740 11/03/24 1216   BP: 122/46 156/56 146/51 147/53   BP Location: Left arm Left arm Left arm Left arm   Patient Position: Lying Lying Lying Sitting   Pulse: 58 72 62 70   Resp: 16 18 16 16   Temp: 98.8 °F (37.1 °C) 98.8 °F (37.1 °C) 97.9 °F (36.6 °C) 98.1 °F (36.7 °C)   TempSrc: Oral Oral Oral Oral   SpO2: 93% 90% 93% 93%   Weight:  67.2 kg (148 lb 2.4 oz)     Height:               Body mass index is 27.1 kg/m².    Intake/Output Summary (Last 24 hours) at 11/3/2024 1440  Last data filed at 11/3/2024 1210  Gross per 24 hour   Intake 1080 ml   Output 2850 ml   Net -1770 ml       Telemetry: Sinus pause, intermittent PVCs, normal sinus rhythm    Physical Exam  Constitutional:  Awake. Not in acute distress. Normal appearance.  On nasal cannula  Neck: No carotid bruit, hepatojugular reflux or JVD.   Cardiovascular:      Rate and Rhythm: Normal rate and regular rhythm.,  Murmurs are present.  Pulmonary: Pulmonary effort is normal. Normal breath sounds. No wheezing, rhonchi or rales.   Extremities: Minimal bilateral edema is noted.   Skin: Warm and dry. Non cyanotic, No petechiae or rash.   Neurological: Alert and oriented x 3        Allergies   Allergen Reactions    Pneumococcal Vac Polyvalent Swelling     Scheduled meds:  enoxaparin, 40 mg, Subcutaneous, Daily  furosemide, 40 mg, Intravenous, Q8H  levothyroxine, 50 mcg, Oral, Q AM  losartan, 25 mg, Oral, Q24H  potassium chloride, 40 mEq, Oral, Daily  sodium chloride, 10 mL, Intravenous, Q12H      IV meds:                         Data Review:  CBC          9/5/2024    14:36 11/1/2024    15:40 11/2/2024    04:06  "  CBC   WBC 6.12  6.49  7.34    RBC 3.90  4.01  3.96    Hemoglobin 11.8  12.1  11.7    Hematocrit 36.5  39.8  39.2    MCV 93.6  99.3  99.0    MCH 30.3  30.2  29.5    MCHC 32.3  30.4  29.8    RDW 12.8  14.6  14.6    Platelets 192  146  142      CMP          11/1/2024    15:40 11/2/2024    04:06 11/2/2024    18:09 11/3/2024    04:13   CMP   Glucose 147  129   105    BUN 12  11   16    Creatinine 0.92  0.77   0.95    EGFR 57.5  71.1   55.3    Sodium 147  145   144    Potassium 3.0  2.7  3.9  3.4    Chloride 101  98   96    Calcium 9.4  8.7   9.5    Total Protein 6.9  6.3      Albumin 4.2  3.8      Globulin 2.7  2.5      Total Bilirubin 1.4  1.5      Alkaline Phosphatase 76  71      AST (SGOT) 21  20      ALT (SGPT) 14  12      Albumin/Globulin Ratio 1.6  1.5      BUN/Creatinine Ratio 13.0  14.3   16.8    Anion Gap 9.0  6.5   5.7       CARDIAC LABS:      Lab 11/01/24  1540   PROBNP 3,121.0*   HSTROP T 32*        No results found for: \"DIGOXIN\"   Lab Results   Component Value Date    TSH 2.060 11/01/2024           Invalid input(s): \"LDLCALC\"  No results found for: \"POCTROP\"  Lab Results   Component Value Date    TROPONINT 32 (H) 11/01/2024   (  Lab Results   Component Value Date    MG 1.7 11/03/2024     Results for orders placed during the hospital encounter of 11/01/24    Adult Transthoracic Echo Complete W/ Cont if Necessary Per Protocol    Interpretation Summary  Severe biatrial enlargement is present.  RV is dilated.  However LV has normal size.  LV has mild concentric hypertrophy.  No regional wall motion abnormalities are noted.  LV systolic function is hyperdynamic.  Estimated LVEF is greater than 70%.  Near obliteration of LV cavity is present.  Diastolic function cannot be accurately graded in the presence of LVH.  At least moderately abnormal diastolic function is present  LVOT Doppler jet is symmetric.  Gradient is not concerning for LVOT obstruction..  Aortic valve is trileaflet.  Leaflets are calcified.  " Mild aortic stenosis is noted.  Mild to moderate aortic regurgitation is present  Mitral valve has thickened leaflets with preserved excursion.  MAC is present.  Mild to moderate MR is present  Tricuspid valve is not well-visualized.  Moderate to severe TR is noted.  Calculated RVSP is 40 mmHg plus right atrial pressure.  Hepatic vein/IVC Doppler is not included in the study.  Mild to moderate pulmonary regurgitation is present.  Small pericardial effusion is noted  IVC is dilated.  Estimated right atrial pressure is greater than 15 mmHg.    No prior study is available for comparison.           ASSESSMENT:  Heart failure with preserved ejection fraction  Small hemodynamically insignificant pericardial effusion  Moderate TR  Calcific mitral valve degeneration      PLAN:  Patient's echocardiogram was discussed with patient's daughter.  She has more than 1 degenerative valvular disease, effecting the hemodynamics therefore it is challenging to optimize guideline directed medical therapy.  The risk of kidney injury is high with optimizing guideline directed medical therapy.  Therefore we will adopt a conservative strategy and do minimum necessary GDMT to keep patient comfortable.  Palliative care is recommended consult is recommended to establish goals of care.  Prognosis is undetermined.  Continue Lasix 40 IV every 8 hours for another 24 hours prior to switching to p.o.  Continue losartan, Add spironolactone 25 mg p.o. daily  Due to marked bradycardia, cannot do beta-blocker therapy      Kevin Oh MD  11/3/2024    14:40 EST

## 2024-11-04 LAB
ANION GAP SERPL CALCULATED.3IONS-SCNC: 8 MMOL/L (ref 5–15)
BUN SERPL-MCNC: 23 MG/DL (ref 8–23)
BUN/CREAT SERPL: 19.7 (ref 7–25)
CALCIUM SPEC-SCNC: 9.8 MG/DL (ref 8.2–9.6)
CHLORIDE SERPL-SCNC: 96 MMOL/L (ref 98–107)
CO2 SERPL-SCNC: 37 MMOL/L (ref 22–29)
CREAT SERPL-MCNC: 1.17 MG/DL (ref 0.57–1)
DEPRECATED RDW RBC AUTO: 51.7 FL (ref 37–54)
EGFRCR SERPLBLD CKD-EPI 2021: 43.1 ML/MIN/1.73
ERYTHROCYTE [DISTWIDTH] IN BLOOD BY AUTOMATED COUNT: 14.3 % (ref 12.3–15.4)
GLUCOSE SERPL-MCNC: 129 MG/DL (ref 65–99)
HCT VFR BLD AUTO: 44.9 % (ref 34–46.6)
HGB BLD-MCNC: 13.4 G/DL (ref 12–15.9)
MAGNESIUM SERPL-MCNC: 1.7 MG/DL (ref 1.7–2.3)
MCH RBC QN AUTO: 29.3 PG (ref 26.6–33)
MCHC RBC AUTO-ENTMCNC: 29.8 G/DL (ref 31.5–35.7)
MCV RBC AUTO: 98.2 FL (ref 79–97)
PLATELET # BLD AUTO: 176 10*3/MM3 (ref 140–450)
PMV BLD AUTO: 12 FL (ref 6–12)
POTASSIUM SERPL-SCNC: 4.1 MMOL/L (ref 3.5–5.2)
RBC # BLD AUTO: 4.57 10*6/MM3 (ref 3.77–5.28)
SODIUM SERPL-SCNC: 141 MMOL/L (ref 136–145)
WBC NRBC COR # BLD AUTO: 10.32 10*3/MM3 (ref 3.4–10.8)

## 2024-11-04 PROCEDURE — 99232 SBSQ HOSP IP/OBS MODERATE 35: CPT | Performed by: STUDENT IN AN ORGANIZED HEALTH CARE EDUCATION/TRAINING PROGRAM

## 2024-11-04 PROCEDURE — 83735 ASSAY OF MAGNESIUM: CPT | Performed by: INTERNAL MEDICINE

## 2024-11-04 PROCEDURE — 97165 OT EVAL LOW COMPLEX 30 MIN: CPT

## 2024-11-04 PROCEDURE — 25010000002 ENOXAPARIN PER 10 MG: Performed by: INTERNAL MEDICINE

## 2024-11-04 PROCEDURE — 25010000002 FUROSEMIDE PER 20 MG: Performed by: STUDENT IN AN ORGANIZED HEALTH CARE EDUCATION/TRAINING PROGRAM

## 2024-11-04 PROCEDURE — 99232 SBSQ HOSP IP/OBS MODERATE 35: CPT | Performed by: INTERNAL MEDICINE

## 2024-11-04 PROCEDURE — 25010000002 ONDANSETRON PER 1 MG: Performed by: INTERNAL MEDICINE

## 2024-11-04 PROCEDURE — 97161 PT EVAL LOW COMPLEX 20 MIN: CPT

## 2024-11-04 PROCEDURE — 80048 BASIC METABOLIC PNL TOTAL CA: CPT | Performed by: INTERNAL MEDICINE

## 2024-11-04 PROCEDURE — 85027 COMPLETE CBC AUTOMATED: CPT | Performed by: INTERNAL MEDICINE

## 2024-11-04 RX ORDER — ACETAMINOPHEN 325 MG/1
650 TABLET ORAL EVERY 6 HOURS PRN
Status: DISCONTINUED | OUTPATIENT
Start: 2024-11-04 | End: 2024-11-08 | Stop reason: HOSPADM

## 2024-11-04 RX ORDER — ONDANSETRON 2 MG/ML
4 INJECTION INTRAMUSCULAR; INTRAVENOUS EVERY 6 HOURS PRN
Status: DISCONTINUED | OUTPATIENT
Start: 2024-11-04 | End: 2024-11-08 | Stop reason: HOSPADM

## 2024-11-04 RX ADMIN — Medication 10 ML: at 21:12

## 2024-11-04 RX ADMIN — SPIRONOLACTONE 25 MG: 25 TABLET, FILM COATED ORAL at 08:44

## 2024-11-04 RX ADMIN — HYDRALAZINE HYDROCHLORIDE 25 MG: 25 TABLET ORAL at 05:14

## 2024-11-04 RX ADMIN — FUROSEMIDE 40 MG: 10 INJECTION, SOLUTION INTRAMUSCULAR; INTRAVENOUS at 08:45

## 2024-11-04 RX ADMIN — LEVOTHYROXINE SODIUM 50 MCG: 50 TABLET ORAL at 05:14

## 2024-11-04 RX ADMIN — Medication 10 ML: at 08:45

## 2024-11-04 RX ADMIN — CALCIUM CARBONATE 1 TABLET: 500 TABLET, CHEWABLE ORAL at 09:06

## 2024-11-04 RX ADMIN — POTASSIUM CHLORIDE 40 MEQ: 750 CAPSULE, EXTENDED RELEASE ORAL at 08:44

## 2024-11-04 RX ADMIN — FUROSEMIDE 40 MG: 10 INJECTION, SOLUTION INTRAMUSCULAR; INTRAVENOUS at 00:52

## 2024-11-04 RX ADMIN — ENOXAPARIN SODIUM 40 MG: 100 INJECTION SUBCUTANEOUS at 08:44

## 2024-11-04 RX ADMIN — ONDANSETRON 4 MG: 2 INJECTION INTRAMUSCULAR; INTRAVENOUS at 15:00

## 2024-11-04 RX ADMIN — LOSARTAN POTASSIUM 25 MG: 25 TABLET, FILM COATED ORAL at 08:44

## 2024-11-04 RX ADMIN — HYDRALAZINE HYDROCHLORIDE 25 MG: 25 TABLET ORAL at 15:00

## 2024-11-04 RX ADMIN — HYDRALAZINE HYDROCHLORIDE 25 MG: 25 TABLET ORAL at 21:12

## 2024-11-04 NOTE — PLAN OF CARE
Goal Outcome Evaluation:  Plan of Care Reviewed With: (P) patient           Outcome Evaluation: (P) Pt presents with mild deficits in B LE strength as well as overall ambulation, transfers, balance, and endurance. Skilled PT intervention is necessary to address noted deficits and promote return to functional baseline. Recommend d/c to home with home health.    Anticipated Discharge Disposition (PT): (P) home with home health

## 2024-11-04 NOTE — PROGRESS NOTES
[facility]   Hospitalist Progress Note  Date: 2024  Patient Name: Loly Terrazas  : 6/10/1929  MRN: [id number]  Date of admission: 2024  Room/Bed: 260/1      Subjective   Subjective     Chief Complaint: short of air     Summary:Loly Terrazas is a 89 y.o. female  with past medical history of COPD, hypertension, CVA, and hypothyroidism who presents with shortness of breath for a month  The patient's history was obtained from her and her daughters at bedside.  Patient's had some leg swelling and some shortness of breath for about 1 month.  However over the last 1 to 2 weeks has gotten progressively worse.  She has orthopnea.  She has PND.  Worsening lower extremity edema.  In the emergency department the patient's vital signs are as follows: Temperature  98.1, pulse 51, respiratory rate was 20, blood pressure 220/66, 85% on room air.  CBC shows no acute abnormalities.  CMP shows normal creatinine.  Sodium is 147.  BNP is 3000 with previous BNP of 419 and ..  Chest x-ray shows cardiomegaly suggestive cardiomyopathy and/or pericardial effusion.  Likely small right pleural effusion with adjacent atelectasis and/or pneumonia.  Patient was given potassium.  She is given 40 of IV Lasix.  Patient's heart failure symptoms are most likely due to diastolic dysfunction from severe hypertension.  She was given Nitropaste if this does not reduce her pressure effectively they will need to started on nitroglycerin drip.  Patient to the hospital for heart failure with unknown EF due to hypertensive emergency causing acute hypoxic respiratory failure.    Interval Followup:   Patient remains admitted to the hospital.   Patient continues to state that she feels short of breath.    She continues to have nausea without vomiting   Echocardiogram shows significant heart failure  Blood pressure remains stable   Patient remains on 2 L of oxygen      Review of Systems    All systems reviewed and negative except for what is  outlined above.      Objective   Objective     Vitals:   Temp:  [97.3 °F (36.3 °C)-98.4 °F (36.9 °C)] 98.2 °F (36.8 °C)  Heart Rate:  [] 71  Resp:  [16-26] 24  BP: (105-147)/(47-66) 129/47  Flow (L/min) (Oxygen Therapy):  [2] 2    Physical Exam   General: Awake, alert, NAD.  Patient is resting in bedside chair. No family at bedside currently   HENT: NCAT, MMM  Eyes: pupils equal, no scleral icterus  Cardiovascular: RRR, systolic murmur noted.  Lower extremity edema noted  Pulmonary: Decreased, no wheezing  Gastrointestinal: S/ND/NT, +BS  Musculoskeletal: No gross deformities  Skin: No jaundice, no rash on exposed skin appreciated  Neuro: CN II through XII grossly intact; speech clear; no tremor  Psych: Mood and affect appropriate  : No Layne catheter; no suprapubic tenderness    Result Review    Result Review:  I have personally reviewed these results:  [x]  Laboratory      Lab 11/04/24  0407 11/02/24  0406 11/01/24  1540   WBC 10.32 7.34 6.49   HEMOGLOBIN 13.4 11.7* 12.1   HEMATOCRIT 44.9 39.2 39.8   PLATELETS 176 142 146   NEUTROS ABS  --  4.99 4.78   IMMATURE GRANS (ABS)  --  0.02 0.02   LYMPHS ABS  --  1.50 1.06   MONOS ABS  --  0.74 0.57   EOS ABS  --  0.07 0.03   MCV 98.2* 99.0* 99.3*         Lab 11/04/24  0407 11/03/24  1612 11/03/24  0413 11/02/24  1809 11/02/24  0406 11/01/24  1540   SODIUM 141  --  144  --  145 147*   POTASSIUM 4.1 4.4 3.4*   < > 2.7* 3.0*   CHLORIDE 96*  --  96*  --  98 101   CO2 37.0*  --  42.3*  --  40.5* 37.0*   ANION GAP 8.0  --  5.7  --  6.5 9.0   BUN 23  --  16  --  11 12   CREATININE 1.17*  --  0.95  --  0.77 0.92   EGFR 43.1*  --  55.3*  --  71.1 57.5*   GLUCOSE 129*  --  105*  --  129* 147*   CALCIUM 9.8*  --  9.5  --  8.7 9.4   MAGNESIUM 1.7  --  1.7  --  1.8 1.6*   HEMOGLOBIN A1C  --   --  5.50  --   --   --    TSH  --   --   --   --   --  2.060    < > = values in this interval not displayed.         Lab 11/02/24  0406 11/01/24  1540   TOTAL PROTEIN 6.3 6.9   ALBUMIN  3.8 4.2   GLOBULIN 2.5 2.7   ALT (SGPT) 12 14   AST (SGOT) 20 21   BILIRUBIN 1.5* 1.4*   ALK PHOS 71 76         Lab 11/01/24  1540   PROBNP 3,121.0*   HSTROP T 32*                 Brief Urine Lab Results       None          [x]  Microbiology   Microbiology Results (last 10 days)       Procedure Component Value - Date/Time    COVID-19, FLU A/B, RSV PCR 1 HR TAT - Swab, Nasopharynx [[id number]]  (Normal) Collected: 11/01/24 1552    Lab Status: Final result Specimen: Swab from Nasopharynx Updated: 11/01/24 1635     COVID19 Not Detected     Influenza A PCR Not Detected     Influenza B PCR Not Detected     RSV, PCR Not Detected    Narrative:      Fact sheet for providers: https://www.fda.gov/media/202607/download    Fact sheet for patients: https://www.fda.gov/media/788788/download    Test performed by PCR.          [x]  Radiology  XR Chest 1 View    Result Date: 11/1/2024  Impression: Cardiomegaly suggestive of cardiomyopathy and/or pericardial effusion. Likely small right pleural effusion with adjacent atelectasis and/or pneumonia. Electronically Signed: Kyle Padilla  11/1/2024 4:31 PM EDT  Workstation ID: [id number]   []  EKG/Telemetry   []  Cardiology/Vascular   []  Pathology  []  Old records  []  Other:    Assessment & Plan   Assessment / Plan     Assessment:  Acute exacerbation of diastolic congestive heart failure  Advanced age with debility  Hypertension  Hypothyroidism    Plan:  Patient remains admitted to the medicine service  Continue patient on IV diuresis  Continue to wean oxygen as tolerated  Replace electrolytes as indicated  Patient has been started on losartan  Cardiology recommends to avoid AV juan blocking agents due to sinus node dysfunction  Echocardiogram shows significant heart failure with Severe biatrial enlargement is present.  RV is dilated.  However LV has normal size.LV has mild concentric hypertrophy.  No regional wall motion abnormalities are noted.  LV systolic function is hyperdynamic.   Estimated LVEF is greater than 70%.  Near obliteration of LV cavity is present.  Diastolic function cannot be accurately graded in the presence of LVH.  At least moderately abnormal diastolic function is present  LVOT Doppler jet is symmetric.  Gradient is not concerning for LVOT obstruction.. Aortic valve is trileaflet.  Leaflets are calcified.  Mild aortic stenosis is noted.  Mild to moderate aortic regurgitation is present Mitral valve has thickened leaflets with preserved excursion.  MAC is present.  Mild to moderate MR is present Tricuspid valve is not well-visualized.  Moderate to severe TR is noted.  Calculated RVSP is 40 mmHg plus right atrial pressure.  Hepatic vein/IVC Doppler is not included in the study. Mild to moderate pulmonary regurgitation is present. Small pericardial effusion is noted  Patient's daughter has asked for palliative care consult to see what services they might be able to offer  Add Zofran for nausea   Repeat laboratory data in the morning  Discussed plan with patient and with nurse       Discussed with RN.    VTE Prophylaxis:  Pharmacologic VTE prophylaxis orders are present.        CODE STATUS:   Medical Intervention Limits: No intubation (DNI)  Code Status (Patient has no pulse and is not breathing): No CPR (Do Not Attempt to Resuscitate)  Medical Interventions (Patient has pulse or is breathing): Limited Support      Electronically signed by Milton Mueller DO, 11/4/2024, 08:15 EST.

## 2024-11-04 NOTE — THERAPY EVALUATION
Acute Care - Physical Therapy Initial Evaluation  [facility]     Patient Name: Loly Terrazas  : 6/10/1929  MRN: [id number]  Today's Date: 2024   Onset of Illness/Injury or Date of Surgery: (P) 24  Visit Dx:     ICD-10-CM ICD-9-CM   1. Acute congestive heart failure, unspecified heart failure type  I50.9 428.0   2. 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    History of stroke    Hypothyroidism    Insomnia, unspecified    Memory change    Renal insufficiency    Scoliosis    Statin intolerance    Dizziness    Mixed hyperlipidemia    Heart failure     Past Medical History:   Diagnosis Date    Allergic rhinitis due to allergen 2018    Anemia 2019    Anxiety disorder 10/26/2020    Arthritis     COPD (chronic obstructive pulmonary disease) 2018    DDD (degenerative disc disease), lumbar 2019    Deafness     Dyspnea 2019    Essential hypertension 2018    Fall 2019    SEAN (generalized anxiety disorder) 2020    Gait disturbance 2018    History of stroke 2018    Hypothyroidism 2018    Insomnia 2019    Joint pain 10/26/2020    Low hemoglobin 2019    Lumbago 2019    Lumbar herniated disc     L5-S1    Memory change 2018    Renal insufficiency 10/26/2020    Scoliosis     Sinus node dysfunction     Statin intolerance 2019    Tremor     Vein disorder 2018     Past Surgical History:   Procedure Laterality Date    CHOLECYSTECTOMY      LEG SURGERY Right     Fracture with pins    THYROIDECTOMY       PT Assessment (Last 12 Hours)       PT Evaluation and Treatment       Row Name 24  1135          Physical Therapy Time and Intention    Subjective Information no complaints (P)   -EW     Document Type evaluation (P)   -EW     Mode of Treatment individual therapy;physical therapy (P)   -EW     Total Minutes, Physical Therapy 25 (P)   -EW     Patient Effort good (P)   -EW     Symptoms Noted During/After Treatment none (P)   -EW       Row Name 11/04/24 1135          General Information    Patient Profile Reviewed yes (P)   -EW     Onset of Illness/Injury or Date of Surgery 11/01/24 (P)   -EW     Patient Observations alert;cooperative;agree to therapy (P)   -EW     Prior Level of Function independent: (P)   -EW     Equipment Currently Used at Home cane, straight (P)   -EW     Existing Precautions/Restrictions fall (P)   -EW     Benefits Reviewed patient:;improve function;increase independence;increase strength;increase balance;decrease pain (P)   -EW     Barriers to Rehab none identified (P)   -EW       Row Name 11/04/24 1135          Living Environment    Current Living Arrangements home (P)   -EW     People in Home alone (P)   Pt reports family members live near by and are arou nd often for ADL assistance  -EW     Primary Care Provided by self;other (see comments) (P)   Family as needed  -EW       Row Name 11/04/24 1135          Home Use of Assistive/Adaptive Equipment    Equipment Currently Used at Home cane, straight;walker, rolling (P)   -EW       Row Name 11/04/24 1135          Range of Motion (ROM)    Range of Motion bilateral lower extremities;ROM is WFL (P)   -EW       Row Name 11/04/24 1135          Strength (Manual Muscle Testing)    Strength (Manual Muscle Testing) bilateral lower extremities (P)   B LE: 4/5 all except 3+/5 bilateral hip flexion  -EW       Row Name 11/04/24 1135          Bed Mobility    Bed Mobility supine-sit (P)   -EW     Supine-Sit [place] (Bed Mobility) minimum assist (75% patient effort);1 person assist (P)   -EW     Bed Mobility, Safety Issues decreased use of  arms for pushing/pulling;decreased use of legs for bridging/pushing (P)   -EW       Row Name 11/04/24 1135          Transfers    Transfers sit-stand transfer;stand-sit transfer (P)   -EW       Row Name 11/04/24 1135          Sit-Stand Transfer    Sit-Stand [place] (Transfers) minimum assist (75% patient effort);1 person assist (P)   -EW     Assistive Device (Sit-Stand Transfers) walker, front-wheeled (P)   -EW       Row Name 11/04/24 1135          Stand-Sit Transfer    Stand-Sit [place] (Transfers) minimum assist (75% patient effort);1 person assist (P)   -EW     Assistive Device (Stand-Sit Transfers) walker, front-wheeled (P)   -EW       Row Name 11/04/24 1135          Gait/Stairs (Locomotion)    Gait/Stairs Locomotion gait/ambulation assistive device (P)   -EW     [place] Level (Gait) minimum assist (75% patient effort);1 person assist (P)   -EW     Assistive Device (Gait) walker, front-wheeled (P)   -EW     Patient was able to Ambulate yes (P)   -EW     Distance in Feet (Gait) 5 (P)   -EW     Deviations/Abnormal Patterns (Gait) base of support, narrow;shan decreased;festinating/shuffling;gait speed decreased;stride length decreased (P)   -EW     Bilateral Gait Deviations forward flexed posture (P)   -EW       Row Name 11/04/24 1135          Safety Issues/Impairments Affecting Functional Mobility    Impairments Affecting Function (Mobility) balance;endurance/activity tolerance;shortness of breath;strength (P)   -EW       Row Name 11/04/24 1135          Balance    Balance Assessment sitting static balance;standing dynamic balance (P)   -EW     Static Sitting Balance independent (P)   -EW     Position, Sitting Balance unsupported;sitting edge of bed (P)   -EW     Dynamic Standing Balance minimal assist;1-person assist (P)   -EW     Position/Device Used, Standing Balance supported;walker, front-wheeled (P)   -EW       Row Name 11/04/24 1135          Plan of Care Review    Plan of Care Reviewed With  patient (P)   -EW     Outcome Evaluation Pt presents with mild deficits in B LE strength as well as overall ambulation, transfers, balance, and endurance. Skilled PT intervention is necessary to address noted deficits and promote return to functional baseline. Recommend d/c to home with home health. (P)   -EW       Row Name 11/04/24 1135          Therapy Assessment/Plan (PT)    Rehab Potential (PT) good (P)   -EW     Criteria for Skilled Interventions Met (PT) yes;skilled treatment is necessary (P)   -EW     Therapy Frequency (PT) daily (P)   -EW     Predicted Duration of Therapy Intervention (PT) 10 days (P)   -EW     Problem List (PT) problems related to;balance;mobility;strength;pain (P)   -EW     Activity Limitations Related to Problem List (PT) unable to ambulate safely;unable to transfer safely (P)   -EW       Row Name 11/04/24 1135          Therapy Plan Review/Discharge Plan (PT)    Therapy Plan Review (PT) evaluation/treatment results reviewed;participants included;patient (P)   -EW       Row Name 11/04/24 1135          Physical Therapy Goals    Transfer Goal Selection (PT) transfer, PT goal 1 (P)   -EW     Gait Training Goal Selection (PT) gait training, PT goal 1 (P)   -EW       Row Name 11/04/24 1135          Transfer Goal 1 (PT)    Activity/Assistive Device (Transfer Goal 1, PT) transfers, all (P)   -EW     [place] Level/Cues Needed (Transfer Goal 1, PT) standby assist (P)   -EW     Time Frame (Transfer Goal 1, PT) long term goal (LTG);10 days (P)   -EW       Row Name 11/04/24 1135          Gait Training Goal 1 (PT)    Activity/Assistive Device (Gait Training Goal 1, PT) gait (walking locomotion);assistive device use (P)   -EW     [place] Level (Gait Training Goal 1, PT) standby assist (P)   -EW     Distance (Gait Training Goal 1, PT) 100 ft (P)   -EW     Time Frame (Gait Training Goal 1, PT) long term goal (LTG);10 days (P)   -EW               User Key  (r) = Recorded By, (t) = Taken By, (c) =  Cosigned By      Initials Name Provider Type    EW Delfino Mcnulty, PT Student PT Student                    Physical Therapy Education       Title: PT OT SLP Therapies (Done)       Topic: Physical Therapy (Done)       Point: Mobility training (Done)       Learning Progress Summary            Patient Acceptance, E,TB, VU by EW at 11/4/2024 1145                      Point: Home exercise program (Done)       Learning Progress Summary            Patient Acceptance, E,TB, VU by EW at 11/4/2024 1145                      Point: Body mechanics (Done)       Learning Progress Summary            Patient Acceptance, E,TB, VU by EW at 11/4/2024 1145                      Point: Precautions (Done)       Learning Progress Summary            Patient Acceptance, E,TB, VU by EW at 11/4/2024 1145                                      User Key       Initials Effective Dates Name Provider Type Discipline     08/27/24 -  Delfino Mcnulty, PT Student PT Student PT                  PT Recommendation and Plan  Anticipated Discharge Disposition (PT): (P) home with home health  Planned Therapy Interventions (PT): (P) balance training, bed mobility training, gait training, home exercise program, motor coordination training, neuromuscular re-education, patient/family education, postural re-education, ROM (range of motion), stair training, strengthening, stretching, transfer training, vestibular therapy  Therapy Frequency (PT): (P) daily  Plan of Care Reviewed With: (P) patient  Outcome Evaluation: (P) Pt presents with mild deficits in B LE strength as well as overall ambulation, transfers, balance, and endurance. Skilled PT intervention is necessary to address noted deficits and promote return to functional baseline. Recommend d/c to home with home health.   Outcome Measures       Row Name 11/04/24 1100             How much help from another person do you currently need...    Turning from your back to your side while in flat bed without using  bedrails? 4 (P)   -EW      Moving from lying on back to sitting on the side of a flat bed without bedrails? 3 (P)   -EW      Moving to and from a bed to a chair (including a wheelchair)? 3 (P)   -EW      Standing up from a chair using your arms (e.g., wheelchair, bedside chair)? 3 (P)   -EW      Climbing 3-5 steps with a railing? 2 (P)   -EW      To walk in hospital room? 3 (P)   -EW      AM-PAC 6 Clicks Score (PT) 18 (P)   -EW         Functional Assessment    Outcome Measure Options AM-PAC 6 Clicks Basic Mobility (PT) (P)   -EW                User Key  (r) = Recorded By, (t) = Taken By, (c) = Cosigned By      Initials Name Provider Type    EW Delfino Mcnulty, PT Student PT Student                     Time Calculation:    PT Charges       Row Name 11/04/24 1135             Time Calculation    PT Received On 11/04/24 (P)   -EW      PT Goal Re-Cert Due Date 11/13/24 (P)   -EW         Untimed Charges    PT Eval/Re-eval Minutes 25 (P)   -EW         Total Minutes    Untimed Charges Total Minutes 25 (P)   -EW       Total Minutes 25 (P)   -EW                User Key  (r) = Recorded By, (t) = Taken By, (c) = Cosigned By      Initials Name Provider Type    EW Delfino Mcnulty, PT Student PT Student                  Therapy Charges for Today       Code Description Service Date Service Provider Modifiers Qty    [id number] HC PT EVAL LOW COMPLEXITY 2 11/4/2024 Delfino Mcnulty, PT Student GP 1            PT G-Codes  Outcome Measure Options: (P) AM-PAC 6 Clicks Basic Mobility (PT)  AM-PAC 6 Clicks Score (PT): (P) 18  AM-PAC 6 Clicks Score (OT): 19    Delfino Mcnulty PT Student  11/4/2024

## 2024-11-04 NOTE — PLAN OF CARE
Goal Outcome Evaluation:   Patient is progressing, care plan ongoing. A&Ox4, up with 1 assist to bedside commode. Weaned down to 1L NC, tolerating well. Patient is NSR on monitor. Fluid restriction at 2000ml/day. Palliative Nurse was in to talk with patient today, will return tomorrow to speak with daughter. PRN zofran given at 1500 for nausea. No other complaints at this time, call light in reach.

## 2024-11-04 NOTE — PLAN OF CARE
Goal Outcome Evaluation:  Plan of Care Reviewed With: patient              Shift has been uneventful. No signs or symptoms of acute distress present. Call light within reach and VSS.

## 2024-11-04 NOTE — PLAN OF CARE
Goal Outcome Evaluation:  Plan of Care Reviewed With: patient        Progress: no change  Outcome Evaluation: Patient presents with limitations affecting strength, activity tolerance, and balance impacting patient's ability to return home safely and independently.  The skills of a therapist will be required to safely and effectively implement the following treatment plan to restore maximal level of function    Anticipated Discharge Disposition (OT): skilled nursing facility, inpatient rehabilitation facility, sub acute care setting

## 2024-11-04 NOTE — THERAPY EVALUATION
Patient Name: Loly Terrazas  : 6/10/1929    MRN: [id number]                              Today's Date: 2024       Admit Date: 2024    Visit Dx:     ICD-10-CM ICD-9-CM   1. 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    History of stroke    Hypothyroidism    Insomnia, unspecified    Memory change    Renal insufficiency    Scoliosis    Statin intolerance    Dizziness    Mixed hyperlipidemia    Heart failure     Past Medical History:   Diagnosis Date    Allergic rhinitis due to allergen 2018    Anemia 2019    Anxiety disorder 10/26/2020    Arthritis     COPD (chronic obstructive pulmonary disease) 2018    DDD (degenerative disc disease), lumbar 2019    Deafness     Dyspnea 2019    Essential hypertension 2018    Fall 2019    SEAN (generalized anxiety disorder) 2020    Gait disturbance 2018    History of stroke 2018    Hypothyroidism 2018    Insomnia 2019    Joint pain 10/26/2020    Low hemoglobin 2019    Lumbago 2019    Lumbar herniated disc     L5-S1    Memory change 2018    Renal insufficiency 10/26/2020    Scoliosis     Sinus node dysfunction     Statin intolerance 2019    Tremor     Vein disorder 2018     Past Surgical History:   Procedure Laterality Date    CHOLECYSTECTOMY      LEG SURGERY Right     Fracture with pins    THYROIDECTOMY        General Information       Row Name 24 0847          OT Time and Intention    Document Type evaluation  -PG     Mode of Treatment individual therapy;occupational therapy  -PG       Row  Name 11/04/24 0847          General Information    Patient Profile Reviewed --  Lives alone but reports some family is there around-the-clock.  Reports independence with all self-care and functional transfers utilizing single tip cane for safety  -PG     Prior Level of Function independent:;transfer;ADL's  -PG     Existing Precautions/Restrictions fall  -PG     Barriers to Rehab none identified  -PG       Row Name 11/04/24 0847          Occupational Profile    Reason for Services/Referral (Occupational Profile) Patient is a pleasant 89-year-old female admitted for CHF and respiratory failure.  Patient is being evaluated by Occupational Therapy due to recent decline in ADL function.  No previous OT services identified  -PG       Row Name 11/04/24 0847          Living Environment    People in Home alone  Patient reports she lives alone but family members are there with her 24/7.  -PG       Row Name 11/04/24 0847          Cognition    Orientation Status (Cognition) oriented x 3  -PG       Row Name 11/04/24 0847          Safety Issues/Impairments Affecting Functional Mobility    Impairments Affecting Function (Mobility) balance;endurance/activity tolerance;strength  -PG               User Key  (r) = Recorded By, (t) = Taken By, (c) = Cosigned By      Initials Name Provider Type    PG Alex Boone, OT Occupational Therapist                     Mobility/ADL's       Row Name 11/04/24 0849          Bed Mobility    Bed Mobility supine-sit  -PG     Supine-Sit [place] (Bed Mobility) moderate assist (50% patient effort)  -PG       Row Name 11/04/24 0849          Transfers    Transfers bed-chair transfer  -PG       Row Name 11/04/24 0849          Bed-Chair Transfer    Bed-Chair [place] (Transfers) contact guard;minimum assist (75% patient effort);verbal cues  -PG     Assistive Device (Bed-Chair Transfers) walker, front-wheeled  -PG       Row Name 11/04/24 0849          Activities of Daily Living    BADL  Assessment/Intervention upper body dressing;bathing;lower body dressing;grooming;toileting  -PG       Row Name 11/04/24 0849          Upper Body Dressing Assessment/Training    [place] Level (Upper Body Dressing) upper body dressing skills;set up  -PG       Row Name 11/04/24 0849          Bathing Assessment/Intervention    [place] Level (Bathing) bathing skills;moderate assist (50% patient effort)  -PG       Row Name 11/04/24 0849          Lower Body Dressing Assessment/Training    [place] Level (Lower Body Dressing) lower body dressing skills;maximum assist (25% patient effort)  -PG       Row Name 11/04/24 0849          Grooming Assessment/Training    [place] Level (Grooming) grooming skills;set up  -PG       Row Name 11/04/24 0849          Toileting Assessment/Training    [place] Level (Toileting) toileting skills;moderate assist (50% patient effort)  -PG               User Key  (r) = Recorded By, (t) = Taken By, (c) = Cosigned By      Initials Name Provider Type    PG Alex Boone OT Occupational Therapist                   Obj/Interventions       Row Name 11/04/24 0850          Sensory Assessment (Somatosensory)    Sensory Assessment (Somatosensory) sensation intact  -PG       Row Name 11/04/24 0850          Vision Assessment/Intervention    Visual Impairment/Limitations WFL  -PG       Row Name 11/04/24 0850          Range of Motion Comprehensive    General Range of Motion no range of motion deficits identified  -PG       Row Name 11/04/24 0850          Strength Comprehensive (MMT)    General Manual Muscle Testing (MMT) Assessment no strength deficits identified  -PG     Comment, General Manual Muscle Testing (MMT) Assessment 4/5 bue  -PG       Row Name 11/04/24 0850          Motor Skills    Motor Skills coordination;functional endurance  -PG     Coordination WFL  -PG     Functional Endurance Fair minus  -PG               User Key  (r) = Recorded By, (t) = Taken By, (c) = Cosigned By       Initials Name Provider Type    PG Alex Boone, OT Occupational Therapist                   Goals/Plan       Row Name 11/04/24 0855          Transfer Goal 1 (OT)    Activity/Assistive Device (Transfer Goal 1, OT) transfers, all  -PG     Keith Level/Cues Needed (Transfer Goal 1, OT) modified independence  -PG     Time Frame (Transfer Goal 1, OT) long term goal (LTG);10 days  -PG       Row Name 11/04/24 0855          Bathing Goal 1 (OT)    Activity/Device (Bathing Goal 1, OT) bathing skills, all  -PG     Keith Level/Cues Needed (Bathing Goal 1, OT) modified independence  -PG     Time Frame (Bathing Goal 1, OT) long term goal (LTG);10 days  -PG       Row Name 11/04/24 0855          Dressing Goal 1 (OT)    Activity/Device (Dressing Goal 1, OT) dressing skills, all  -PG     Keith/Cues Needed (Dressing Goal 1, OT) modified independence  -PG     Time Frame (Dressing Goal 1, OT) long term goal (LTG);10 days  -PG       Row Name 11/04/24 0855          Toileting Goal 1 (OT)    Activity/Device (Toileting Goal 1, OT) toileting skills, all  -PG     Keith Level/Cues Needed (Toileting Goal 1, OT) modified independence  -PG     Time Frame (Toileting Goal 1, OT) long term goal (LTG);10 days  -PG       Row Name 11/04/24 0855          Grooming Goal 1 (OT)    Activity/Device (Grooming Goal 1, OT) grooming skills, all  -PG     Keith (Grooming Goal 1, OT) modified independence  -PG     Time Frame (Grooming Goal 1, OT) long term goal (LTG);10 days  -PG       Row Name 11/04/24 0855          Problem Specific Goal 1 (OT)    Problem Specific Goal 1 (OT) Patient will improve activity tolerance to fair plus to support independence and engagement with ADL activities  -PG     Time Frame (Problem Specific Goal 1, OT) long term goal (LTG);10 days  -PG       Row Name 11/04/24 0855          Therapy Assessment/Plan (OT)    Planned Therapy Interventions (OT) activity tolerance training;BADL  retraining;strengthening exercise;transfer/mobility retraining;patient/caregiver education/training;occupation/activity based interventions  -PG               User Key  (r) = Recorded By, (t) = Taken By, (c) = Cosigned By      Initials Name Provider Type    PG Alex Boone, [place] Occupational Therapist                   Clinical Impression       Row Name 11/04/24 0853          Pain Assessment    Pretreatment Pain Rating 8/10  -PG     Posttreatment Pain Rating 8/10  -PG     Pain Location back  -PG     Pain Management Interventions nursing notified  -PG       Row Name 11/04/24 0853          Plan of Care Review    Plan of Care Reviewed With patient  -PG     Progress no change  -PG     Outcome Evaluation Patient presents with limitations affecting strength, activity tolerance, and balance impacting patient's ability to return home safely and independently.  The skills of a therapist will be required to safely and effectively implement the following treatment plan to restore maximal level of function  -PG       Row Name 11/04/24 0853          Therapy Assessment/Plan (OT)    Patient/Family Therapy Goal Statement (OT) Get stronger and return home independently  -PG     Rehab Potential (OT) good  -PG     Criteria for Skilled Therapeutic Interventions Met (OT) yes;meets criteria;skilled treatment is necessary  -PG     Therapy Frequency (OT) 5 times/wk  -PG       Row Name 11/04/24 0853          Therapy Plan Review/Discharge Plan (OT)    Anticipated Discharge Disposition (OT) skilled nursing facility;inpatient rehabilitation facility;sub acute care setting  -PG               User Key  (r) = Recorded By, (t) = Taken By, (c) = Cosigned By      Initials Name Provider Type    PG Alex Boone OT Occupational Therapist                   Outcome Measures       Row Name 11/04/24 0856          How much help from another is currently needed...    Putting on and taking off regular lower body clothing? 2  -PG     Bathing (including  washing, rinsing, and drying) 3  -PG     Toileting (which includes using toilet bed pan or urinal) 2  -PG     Putting on and taking off regular upper body clothing 4  -PG     Taking care of personal grooming (such as brushing teeth) 4  -PG     Eating meals 4  -PG     AM-PAC 6 Clicks Score (OT) 19  -PG       Row Name 11/04/24 0856          Functional Assessment    Outcome Measure Options AM-PAC 6 Clicks Daily Activity (OT);Optimal Instrument  -PG       Row Name 11/04/24 0856          Optimal Instrument    Optimal Instrument Optimal - 3  -PG     Bending/Stooping 4  -PG     Standing 2  -PG     Reaching 2  -PG     From the list, choose the 3 activities you would most like to be able to do without any difficulty Bending/stooping;Standing;Reaching  -PG     Total Score Optimal - 3 8  -PG               User Key  (r) = Recorded By, (t) = Taken By, (c) = Cosigned By      Initials Name Provider Type    PG Alex Boone OT Occupational Therapist                    Occupational Therapy Education       Title: PT OT SLP Therapies (Done)       Topic: Occupational Therapy (Done)       Point: ADL training (Done)       Description:   Instruct learner(s) on proper safety adaptation and remediation techniques during self care or transfers.   Instruct in proper use of assistive devices.                  Learning Progress Summary            Patient Acceptance, E,D, DU by PG at 11/4/2024 0857                      Point: Home exercise program (Done)       Description:   Instruct learner(s) on appropriate technique for monitoring, assisting and/or progressing therapeutic exercises/activities.                  Learning Progress Summary            Patient Acceptance, E,D, DU by PG at 11/4/2024 0857                      Point: Precautions (Done)       Description:   Instruct learner(s) on prescribed precautions during self-care and functional transfers.                  Learning Progress Summary            Patient Acceptance, E,D, DU by PG at  11/4/2024 0857                      Point: Body mechanics (Done)       Description:   Instruct learner(s) on proper positioning and spine alignment during self-care, functional mobility activities and/or exercises.                  Learning Progress Summary            Patient Acceptance, E,D, DU by PG at 11/4/2024 0857                                      User Key       Initials Effective Dates Name Provider Type Discipline    PG 06/16/21 -  Alex Boone, OT Occupational Therapist OT                  OT Recommendation and Plan  Planned Therapy Interventions (OT): activity tolerance training, BADL retraining, strengthening exercise, transfer/mobility retraining, patient/caregiver education/training, occupation/activity based interventions  Therapy Frequency (OT): 5 times/wk  Plan of Care Review  Plan of Care Reviewed With: patient  Progress: no change  Outcome Evaluation: Patient presents with limitations affecting strength, activity tolerance, and balance impacting patient's ability to return home safely and independently.  The skills of a therapist will be required to safely and effectively implement the following treatment plan to restore maximal level of function     Time Calculation:   Evaluation Complexity (OT)  Review Occupational Profile/Medical/Therapy History Complexity: brief/low complexity  Assessment, Occupational Performance/Identification of Deficit Complexity: 3-5 performance deficits  Clinical Decision Making Complexity (OT): problem focused assessment/low complexity  Overall Complexity of Evaluation (OT): low complexity     Time Calculation- OT       Row Name 11/04/24 0858             Time Calculation- OT    OT Received On 11/04/24  -PG      OT Goal Re-Cert Due Date 11/13/24  -PG         Untimed Charges    OT Eval/Re-eval Minutes 35  -PG         Total Minutes    Untimed Charges Total Minutes 35  -PG       Total Minutes 35  -PG                User Key  (r) = Recorded By, (t) = Taken By, (c) =  Cosigned By      Initials Name Provider Type    PG Alex Boone OT Occupational Therapist                  Therapy Charges for Today       Code Description Service Date Service Provider Modifiers Qty    [id number] HC OT EVAL LOW COMPLEXITY 3 11/4/2024 Alex Boone OT GO 1                 Alex Boone OT  11/4/2024

## 2024-11-04 NOTE — CONSULTS
Purpose of the visit was to evaluate for: goals of care/advanced care planning and support for patient/family. Spoke with RN, patient, and family and discussed palliative care, goals of care, care options, and advanced care planning.      Assessment:  Mrs. Terrazas has a past medical history of COPD, hypertension, CVA, and hypothyroidism who presents with shortness of breath for a month  The patient's history was obtained from her and her daughters at bedside.  Patient's had some leg swelling and some shortness of breath for about 1 month.  However over the last 1 to 2 weeks has gotten progressively worse.  She has orthopnea.  She has PND.  Worsening lower extremity edema. Pt was unable to answer orientation questions at time of consult. Spoke with primary RN who reports that she has times she can't answer and times she can. Spoke with daughter on phone who was on her way to hospital. Would try and stop by to discuss goals/resources and [place]-MOST form but if unable today plan to meet at 11am 11/5/24. Palliative care will continue to follow.     Recommendations/Plan:  pending clinical course .    Tasks Completed: Emotional Support.    Meche RAMACHANDRAN RN  Palliative Care

## 2024-11-05 LAB
ANION GAP SERPL CALCULATED.3IONS-SCNC: 7.7 MMOL/L (ref 5–15)
BUN SERPL-MCNC: 37 MG/DL (ref 8–23)
BUN/CREAT SERPL: 19 (ref 7–25)
CALCIUM SPEC-SCNC: 9.8 MG/DL (ref 8.2–9.6)
CHLORIDE SERPL-SCNC: 93 MMOL/L (ref 98–107)
CO2 SERPL-SCNC: 38.3 MMOL/L (ref 22–29)
CREAT SERPL-MCNC: 1.95 MG/DL (ref 0.57–1)
DEPRECATED RDW RBC AUTO: 52.6 FL (ref 37–54)
EGFRCR SERPLBLD CKD-EPI 2021: 23.3 ML/MIN/1.73
ERYTHROCYTE [DISTWIDTH] IN BLOOD BY AUTOMATED COUNT: 14.3 % (ref 12.3–15.4)
GLUCOSE SERPL-MCNC: 117 MG/DL (ref 65–99)
HCT VFR BLD AUTO: 44.6 % (ref 34–46.6)
HGB BLD-MCNC: 13.1 G/DL (ref 12–15.9)
MAGNESIUM SERPL-MCNC: 1.9 MG/DL (ref 1.7–2.3)
MCH RBC QN AUTO: 29.2 PG (ref 26.6–33)
MCHC RBC AUTO-ENTMCNC: 29.4 G/DL (ref 31.5–35.7)
MCV RBC AUTO: 99.3 FL (ref 79–97)
PLATELET # BLD AUTO: 185 10*3/MM3 (ref 140–450)
PMV BLD AUTO: 11.6 FL (ref 6–12)
POTASSIUM SERPL-SCNC: 5.1 MMOL/L (ref 3.5–5.2)
QT INTERVAL: 395 MS
QTC INTERVAL: 453 MS
RBC # BLD AUTO: 4.49 10*6/MM3 (ref 3.77–5.28)
SODIUM SERPL-SCNC: 139 MMOL/L (ref 136–145)
WBC NRBC COR # BLD AUTO: 11.48 10*3/MM3 (ref 3.4–10.8)

## 2024-11-05 PROCEDURE — 25010000002 ENOXAPARIN PER 10 MG: Performed by: INTERNAL MEDICINE

## 2024-11-05 PROCEDURE — 85027 COMPLETE CBC AUTOMATED: CPT | Performed by: INTERNAL MEDICINE

## 2024-11-05 PROCEDURE — 25810000003 LACTATED RINGERS PER 1000 ML: Performed by: STUDENT IN AN ORGANIZED HEALTH CARE EDUCATION/TRAINING PROGRAM

## 2024-11-05 PROCEDURE — 97530 THERAPEUTIC ACTIVITIES: CPT

## 2024-11-05 PROCEDURE — 80048 BASIC METABOLIC PNL TOTAL CA: CPT | Performed by: INTERNAL MEDICINE

## 2024-11-05 PROCEDURE — 99233 SBSQ HOSP IP/OBS HIGH 50: CPT | Performed by: INTERNAL MEDICINE

## 2024-11-05 PROCEDURE — 99232 SBSQ HOSP IP/OBS MODERATE 35: CPT | Performed by: STUDENT IN AN ORGANIZED HEALTH CARE EDUCATION/TRAINING PROGRAM

## 2024-11-05 PROCEDURE — 93005 ELECTROCARDIOGRAM TRACING: CPT | Performed by: INTERNAL MEDICINE

## 2024-11-05 PROCEDURE — 97535 SELF CARE MNGMENT TRAINING: CPT

## 2024-11-05 PROCEDURE — 83735 ASSAY OF MAGNESIUM: CPT | Performed by: INTERNAL MEDICINE

## 2024-11-05 RX ORDER — ENOXAPARIN SODIUM 100 MG/ML
30 INJECTION SUBCUTANEOUS DAILY
Status: DISCONTINUED | OUTPATIENT
Start: 2024-11-06 | End: 2024-11-08 | Stop reason: HOSPADM

## 2024-11-05 RX ORDER — AMLODIPINE BESYLATE 5 MG/1
5 TABLET ORAL
Status: DISCONTINUED | OUTPATIENT
Start: 2024-11-06 | End: 2024-11-06

## 2024-11-05 RX ORDER — LORAZEPAM 2 MG/ML
0.25 INJECTION INTRAMUSCULAR EVERY 6 HOURS PRN
Status: DISCONTINUED | OUTPATIENT
Start: 2024-11-05 | End: 2024-11-08 | Stop reason: HOSPADM

## 2024-11-05 RX ORDER — SODIUM CHLORIDE, SODIUM LACTATE, POTASSIUM CHLORIDE, CALCIUM CHLORIDE 600; 310; 30; 20 MG/100ML; MG/100ML; MG/100ML; MG/100ML
100 INJECTION, SOLUTION INTRAVENOUS CONTINUOUS
Status: ACTIVE | OUTPATIENT
Start: 2024-11-05 | End: 2024-11-06

## 2024-11-05 RX ADMIN — LEVOTHYROXINE SODIUM 50 MCG: 50 TABLET ORAL at 05:21

## 2024-11-05 RX ADMIN — Medication 10 ML: at 09:57

## 2024-11-05 RX ADMIN — ENOXAPARIN SODIUM 40 MG: 100 INJECTION SUBCUTANEOUS at 09:57

## 2024-11-05 RX ADMIN — SODIUM CHLORIDE, POTASSIUM CHLORIDE, SODIUM LACTATE AND CALCIUM CHLORIDE 100 ML/HR: 600; 310; 30; 20 INJECTION, SOLUTION INTRAVENOUS at 21:08

## 2024-11-05 RX ADMIN — ACETAMINOPHEN 650 MG: 325 TABLET ORAL at 12:11

## 2024-11-05 RX ADMIN — Medication 10 ML: at 21:09

## 2024-11-05 NOTE — PROGRESS NOTES
[facility]   Hospitalist Progress Note  Date: 2024  Patient Name: Loly Terrazas  : 6/10/1929  MRN: [id number]  Date of admission: 2024  Room/Bed: 260/1      Subjective   Subjective     Chief Complaint: short of air     Summary:Loly Terrazas is a 89 y.o. female  with past medical history of COPD, hypertension, CVA, and hypothyroidism who presents with shortness of breath for a month  The patient's history was obtained from her and her daughters at bedside.  Patient's had some leg swelling and some shortness of breath for about 1 month.  However over the last 1 to 2 weeks has gotten progressively worse.  She has orthopnea.  She has PND.  Worsening lower extremity edema.  In the emergency department the patient's vital signs are as follows: Temperature  98.1, pulse 51, respiratory rate was 20, blood pressure 220/66, 85% on room air.  CBC shows no acute abnormalities.  CMP shows normal creatinine.  Sodium is 147.  BNP is 3000 with previous BNP of 419 and ..  Chest x-ray shows cardiomegaly suggestive cardiomyopathy and/or pericardial effusion.  Likely small right pleural effusion with adjacent atelectasis and/or pneumonia.  Patient was given potassium.  She is given 40 of IV Lasix.  Patient's heart failure symptoms are most likely due to diastolic dysfunction from severe hypertension.  She was given Nitropaste if this does not reduce her pressure effectively they will need to started on nitroglycerin drip.  Patient to the hospital for heart failure with unknown EF due to hypertensive emergency causing acute hypoxic respiratory failure.    Interval Followup:   No acute events overnight.  States she still feels very short of breath with minimal exertion.  Worked with therapy this morning and has been fatigued ever since.  Has had some intermittent confusion and agitation per nursing.  Hypotensive this morning.    Objective   Objective     Vitals:   Temp:  [97.5 °F (36.4 °C)-98.2 °F (36.8 °C)] 97.5 °F  (36.4 °C)  Heart Rate:  [] 78  Resp:  [21-28] 22  BP: ()/(34-81) 113/81  Flow (L/min) (Oxygen Therapy):  [1-3] 3    Physical Exam   General: Awake, alert, NAD, sitting up in bedside chair  HENT: NCAT, MMM  Cardiovascular: RRR, systolic murmur appreciated  Pulmonary: Decreased, otherwise clear, no W/R/R  Gastrointestinal: S/ND/NT, +BS  Musculoskeletal: No gross deformities  Skin: No jaundice, no rash on exposed skin appreciated  Neuro: CN II through XII grossly intact; speech clear; no tremor    Result Review    I have personally reviewed these results:  [x]  Laboratory personally reviewed BMP, CBC, magnesium      Lab 11/05/24 0427 11/04/24 0407 11/02/24 0406 11/01/24  1540   WBC 11.48* 10.32 7.34 6.49   HEMOGLOBIN 13.1 13.4 11.7* 12.1   HEMATOCRIT 44.6 44.9 39.2 39.8   PLATELETS 185 176 142 146   NEUTROS ABS  --   --  4.99 4.78   IMMATURE GRANS (ABS)  --   --  0.02 0.02   LYMPHS ABS  --   --  1.50 1.06   MONOS ABS  --   --  0.74 0.57   EOS ABS  --   --  0.07 0.03   MCV 99.3* 98.2* 99.0* 99.3*         Lab 11/05/24 0427 11/04/24 0407 11/03/24  1612 11/03/24  0413 11/02/24  0406 11/01/24  1540   SODIUM 139 141  --  144   < > 147*   POTASSIUM 5.1 4.1 4.4 3.4*   < > 3.0*   CHLORIDE 93* 96*  --  96*   < > 101   CO2 38.3* 37.0*  --  42.3*   < > 37.0*   ANION GAP 7.7 8.0  --  5.7   < > 9.0   BUN 37* 23  --  16   < > 12   CREATININE 1.95* 1.17*  --  0.95   < > 0.92   EGFR 23.3* 43.1*  --  55.3*   < > 57.5*   GLUCOSE 117* 129*  --  105*   < > 147*   CALCIUM 9.8* 9.8*  --  9.5   < > 9.4   MAGNESIUM 1.9 1.7  --  1.7   < > 1.6*   HEMOGLOBIN A1C  --   --   --  5.50  --   --    TSH  --   --   --   --   --  2.060    < > = values in this interval not displayed.         Lab 11/02/24  0406 11/01/24  1540   TOTAL PROTEIN 6.3 6.9   ALBUMIN 3.8 4.2   GLOBULIN 2.5 2.7   ALT (SGPT) 12 14   AST (SGOT) 20 21   BILIRUBIN 1.5* 1.4*   ALK PHOS 71 76         Lab 11/01/24  1540   PROBNP 3,121.0*   HSTROP T 32*                  Brief Urine Lab Results       None          [x]  Microbiology   [x]  Radiology  []  EKG/Telemetry   []  Cardiology/Vascular   []  Pathology  []  Old records  []  Other:    Assessment & Plan   Assessment / Plan   Hypotension  NANETTE  Acute exacerbation of diastolic congestive heart failure  Advanced age with debility  Hypertension  Hypothyroidism  Moderate TR  Paroxysmal SVT    Continue to monitor in the hospital for workup and management of the above  Notable hypotension and NANETTE this morning, hold losartan, diuretics, hydralazine and Aldactone today  Monitor renal function closely, try to avoid IV fluids but may need to use a small amount  IV Ativan if needed for severe anxiety/agitation  Continue to wean oxygen as tolerated  Continue appropriate home medications  Discussed with cardiology-patient does have multiple valvular abnormalities and overall poor prognosis especially in the setting of inability to tolerate goal-directed medical therapy  Palliative care has been consulted-at this point, the plan is to attempt rehab before considering hospice  PT/OT following  Trend renal function and electrolytes with a.m. BMP, magnesium   Trend Hgb and WBC with a.m. CBC     Discussed with RN, cardiology    VTE Prophylaxis:  Pharmacologic VTE prophylaxis orders are present.        CODE STATUS:   Medical Intervention Limits: No intubation (DNI)  Code Status (Patient has no pulse and is not breathing): No CPR (Do Not Attempt to Resuscitate)  Medical Interventions (Patient has pulse or is breathing): Limited Support

## 2024-11-05 NOTE — PLAN OF CARE
Goal Outcome Evaluation:   Patient progressing, care plan ongoing. A&Ox4, with periods of confusion. Palliative consulted today, family and patient not quite on board with yet, would like to do rehab.  looking for placement at rehab. On 3L NC. NSR to Afib throughout shift, HR between 90s-110s. MD notified. EKG done and PRN med on board. Tyl given for generalized pain. Patient sat up in chair today for couple of hours, tolerated well. No other complaints at this time, call light in reach.

## 2024-11-05 NOTE — PLAN OF CARE
Goal Outcome Evaluation:  Plan of Care Reviewed With: patient              Patient resting in bed. Currently on NC @ 2LPM. Patient has no concerns at this time an no signs of acute distress present. Call light within reach and VSS.

## 2024-11-05 NOTE — PROGRESS NOTES
CARDIOLOY  INPATIENT PROGRESS NOTE         [address]    11/4/2024      PATIENT IDENTIFICATION:   Name:  Loly Terrazas      MRN:  [id number]     89 y.o.  female               CC SOB  SUBJECTIVE:   Feels better in terms of breathing.  Otherwise wants to come out of bed and ambulate.  No complaints today  OBJECTIVE:  Vitals:    11/04/24 0723 11/04/24 0830 11/04/24 1127 11/04/24 1441   BP: 129/47 130/61 125/44 123/53   BP Location: Left arm  Right arm Right arm   Patient Position: Lying  Lying Lying   Pulse: 71  90 70   Resp: 24 24 28   Temp: 98.2 °F (36.8 °C)  97.9 °F (36.6 °C) 98.2 °F (36.8 °C)   TempSrc: Oral  Oral Oral   SpO2: 90%  95% 90%   Weight:       Height:               Body mass index is 24.52 kg/m².    Intake/Output Summary (Last 24 hours) at 11/4/2024 1911  Last data filed at 11/4/2024 1243  Gross per 24 hour   Intake 120 ml   Output 900 ml   Net -780 ml       Telemetry: Sinus pause, intermittent PVCs, normal sinus rhythm    Physical Exam  Constitutional:  Awake. Not in acute distress. Normal appearance.  On nasal cannula  Neck: No carotid bruit, hepatojugular reflux or JVD.   Cardiovascular:      Rate and Rhythm: Normal rate and regular rhythm.,  Murmurs are present.  Pulmonary: Pulmonary effort is normal. Normal breath sounds. No wheezing, rhonchi or rales.   Extremities: Minimal bilateral edema is noted.   Skin: Warm and dry. Non cyanotic, No petechiae or rash.   Neurological: Alert and oriented x 3        Allergies   Allergen Reactions    Pneumococcal Vac Polyvalent Swelling     Scheduled meds:  enoxaparin, 40 mg, Subcutaneous, Daily  hydrALAZINE, 25 mg, Oral, Q8H  levothyroxine, 50 mcg, Oral, Q AM  losartan, 25 mg, Oral, Q24H  potassium chloride, 40 mEq, Oral, Daily  sodium chloride, 10 mL, Intravenous, Q12H  spironolactone, 25 mg, Oral, Daily      IV meds:                         Data Review:  CBC          11/1/2024    15:40 11/2/2024    04:06 11/4/2024    04:07   CBC   WBC 6.49   "7.34  10.32    RBC 4.01  3.96  4.57    Hemoglobin 12.1  11.7  13.4    Hematocrit 39.8  39.2  44.9    MCV 99.3  99.0  98.2    MCH 30.2  29.5  29.3    MCHC 30.4  29.8  29.8    RDW 14.6  14.6  14.3    Platelets 146  142  176      CMP          11/2/2024    04:06 11/2/2024    18:09 11/3/2024    04:13 11/3/2024    16:12 11/4/2024    04:07   CMP   Glucose 129   105   129    BUN 11   16   23    Creatinine 0.77   0.95   1.17    EGFR 71.1   55.3   43.1    Sodium 145   144   141    Potassium 2.7  3.9  3.4  4.4  4.1    Chloride 98   96   96    Calcium 8.7   9.5   9.8    Total Protein 6.3        Albumin 3.8        Globulin 2.5        Total Bilirubin 1.5        Alkaline Phosphatase 71        AST (SGOT) 20        ALT (SGPT) 12        Albumin/Globulin Ratio 1.5        BUN/Creatinine Ratio 14.3   16.8   19.7    Anion Gap 6.5   5.7   8.0       CARDIAC LABS:      Lab 11/01/24  1540   PROBNP 3,121.0*   HSTROP T 32*        No results found for: \"DIGOXIN\"   Lab Results   Component Value Date    TSH 2.060 11/01/2024           Invalid input(s): \"LDLCALC\"  No results found for: \"POCTROP\"  Lab Results   Component Value Date    TROPONINT 32 (H) 11/01/2024   (  Lab Results   Component Value Date    MG 1.7 11/04/2024     Results for orders placed during the hospital encounter of 11/01/24    Adult Transthoracic Echo Complete W/ Cont if Necessary Per Protocol    Interpretation Summary  Severe biatrial enlargement is present.  RV is dilated.  However LV has normal size.  LV has mild concentric hypertrophy.  No regional wall motion abnormalities are noted.  LV systolic function is hyperdynamic.  Estimated LVEF is greater than 70%.  Near obliteration of LV cavity is present.  Diastolic function cannot be accurately graded in the presence of LVH.  At least moderately abnormal diastolic function is present  LVOT Doppler jet is symmetric.  Gradient is not concerning for LVOT obstruction..  Aortic valve is trileaflet.  Leaflets are calcified.  Mild " aortic stenosis is noted.  Mild to moderate aortic regurgitation is present  Mitral valve has thickened leaflets with preserved excursion.  MAC is present.  Mild to moderate MR is present  Tricuspid valve is not well-visualized.  Moderate to severe TR is noted.  Calculated RVSP is 40 mmHg plus right atrial pressure.  Hepatic vein/IVC Doppler is not included in the study.  Mild to moderate pulmonary regurgitation is present.  Small pericardial effusion is noted  IVC is dilated.  Estimated right atrial pressure is greater than 15 mmHg.    No prior study is available for comparison.           ASSESSMENT:  Heart failure with preserved ejection fraction  Small hemodynamically insignificant pericardial effusion  Moderate TR  Calcific mitral valve degeneration  Paroxysmal SVT    PLAN:  Patient's echocardiogram was discussed with patient's daughter.  She has more than 1 degenerative valvular disease, effecting the hemodynamics therefore it is challenging to optimize guideline directed medical therapy.    The risk of kidney injury is high with optimizing guideline directed medical therapy.  Creatinine elevation is noted today, diuresis holiday for 24 hours    Palliative care is on board.  Blood pressure is optimally controlled  Continue spironolactone, losartan and hydralazine.  Patient is exhibiting SVTs.  Due to concern for significant bradycardia we will currently not initiate beta-blocker therapy    Kevin Oh MD  11/4/2024    19:11 EST

## 2024-11-05 NOTE — THERAPY TREATMENT NOTE
Patient Name: Loly Terrazas  : 6/10/1929    MRN: [id number]                              Today's Date: 2024       Admit Date: 2024    Visit Dx:     ICD-10-CM ICD-9-CM   1. Acute congestive heart failure, unspecified heart failure type  I50.9 428.0   2. 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    History of stroke    Hypothyroidism    Insomnia, unspecified    Memory change    Renal insufficiency    Scoliosis    Statin intolerance    Dizziness    Mixed hyperlipidemia    Heart failure     Past Medical History:   Diagnosis Date    Allergic rhinitis due to allergen 2018    Anemia 2019    Anxiety disorder 10/26/2020    Arthritis     COPD (chronic obstructive pulmonary disease) 2018    DDD (degenerative disc disease), lumbar 2019    Deafness     Dyspnea 2019    Essential hypertension 2018    Fall 2019    SEAN (generalized anxiety disorder) 2020    Gait disturbance 2018    History of stroke 2018    Hypothyroidism 2018    Insomnia 2019    Joint pain 10/26/2020    Low hemoglobin 2019    Lumbago 2019    Lumbar herniated disc     L5-S1    Memory change 2018    Renal insufficiency 10/26/2020    Scoliosis     Sinus node dysfunction     Statin intolerance 2019    Tremor     Vein disorder 2018     Past Surgical History:   Procedure Laterality Date    CHOLECYSTECTOMY      LEG SURGERY Right     Fracture with pins    THYROIDECTOMY        General Information       Row Name 24 1333          OT Time and Intention    Document Type therapy note (daily note)  -LF     Mode of Treatment  individual therapy;occupational therapy  -LF     Patient Effort good  -LF               User Key  (r) = Recorded By, (t) = Taken By, (c) = Cosigned By      Initials Name Provider Type    LF Elissa Montes OT Occupational Therapist                     Mobility/ADL's       Row Name 11/05/24 1333          Bed Mobility    Comment, (Bed Mobility) Pt seated on [facility] upon OT's arrival.  -LF       Row Name 11/05/24 1333          Transfers    Transfers sit-stand transfer;stand-sit transfer;toilet transfer  -LF       Row Name 11/05/24 1333          Sit-Stand Transfer    Sit-Stand [place] (Transfers) minimum assist (75% patient effort);1 person assist  -LF     Assistive Device (Sit-Stand Transfers) walker, front-wheeled  -LF       Row Name 11/05/24 1333          Stand-Sit Transfer    Stand-Sit [place] (Transfers) minimum assist (75% patient effort);1 person assist  -LF     Assistive Device (Stand-Sit Transfers) walker, front-wheeled  -LF       Row Name 11/05/24 1333          Toilet Transfer    Type (Toilet Transfer) sit-stand;stand-sit  -LF     [place] Level (Toilet Transfer) minimum assist (75% patient effort);verbal cues  -     Assistive Device (Toilet Transfer) commode, 3-in-1;walker, front-wheeled  -LF       Row Name 11/05/24 1333          Functional Mobility    Functional Mobility- Ind. Level contact guard assist  -LF     Functional Mobility- Device walker, front-wheeled  -LF     Functional Mobility- Safety Issues sequencing ability decreased;step length decreased  -LF     Functional Mobility- Comment Pt completed functional mobility to the sink in preparation for grooming with CGA using RW, requiring two standing rest breaks. Max cues throughout for upright posture and safety using RW.  -LF       Row Name 11/05/24 1333          Activities of Daily Living    BADL Assessment/Intervention lower body dressing;grooming;toileting  -       Row Name 11/05/24 1333          Lower Body Dressing  Assessment/Training    Addison Level (Lower Body Dressing) lower body dressing skills;don;pants/bottoms;socks;maximum assist (25% patient effort)  -LF     Position (Lower Body Dressing) unsupported sitting;supported standing  -       Row Name 11/05/24 1333          Grooming Assessment/Training    Addison Level (Grooming) grooming skills;hair care, combing/brushing;oral care regimen;set up  -LF     Position (Grooming) unsupported sitting;sink side  in standard chair  -     Comment, (Grooming) Attempted standing at the sink but unable to d/t fatigue.  -       Row Name 11/05/24 1333          Toileting Assessment/Training    Addison Level (Toileting) toileting skills;adjust/manage clothing;change pad/brief;perform perineal hygiene;maximum assist (25% patient effort);dependent (less than 25% patient effort)  -     Assistive Devices (Toileting) commode, 3-in-1  -LF     Position (Toileting) unsupported sitting;supported standing  -               User Key  (r) = Recorded By, (t) = Taken By, (c) = Cosigned By      Initials Name Provider Type     Elissa Montes OT Occupational Therapist                   Obj/Interventions       Row Name 11/05/24 1336          Motor Skills    Functional Endurance Poor+  -       Row Name 11/05/24 1336          Balance    Balance Assessment sitting dynamic balance;standing dynamic balance  -     Dynamic Sitting Balance supervision  -LF     Position, Sitting Balance supported;sitting in chair  -LF     Dynamic Standing Balance contact guard  -LF     Position/Device Used, Standing Balance supported;walker, front-wheeled  -     Balance Interventions sitting;standing;sit to stand;supported;dynamic;occupation based/functional task;weight shifting activity  -               User Key  (r) = Recorded By, (t) = Taken By, (c) = Cosigned By      Initials Name Provider Type     Elissa Montes OT Occupational Therapist                   Goals/Plan    No  documentation.                  Clinical Impression       Row Name 11/05/24 1337          Pain Assessment    Additional Documentation Pain Scale: FACES Pre/Post-Treatment (Group)  -LF       Row Name 11/05/24 1337          Pain Scale: FACES Pre/Post-Treatment    Pain: FACES Scale, Pretreatment 0-->no hurt  -LF     Posttreatment Pain Rating 0-->no hurt  -LF       Row Name 11/05/24 1336          Plan of Care Review    Plan of Care Reviewed With patient  -LF     Progress improving  -     Outcome Evaluation Pt met while seated on BSC, completing toileting and LB dressing with max assist/dependent. She then completed functional mobility to the sink in prepartion for grooming but unable to complete in standing secondary to fatigue, completing oral care/combing her hair while seated in a standard chair at the sink. She would benefit from continued OT services until safe d/c.  -       Row Name 11/05/24 1337          Vital Signs    O2 Delivery Pre Treatment nasal cannula  -     O2 Delivery Intra Treatment nasal cannula  -     O2 Delivery Post Treatment nasal cannula  -       Row Name 11/05/24 1330          Positioning and Restraints    Pre-Treatment Position bedside commode  -     Post Treatment Position chair  -     In Chair reclined;notified nsg;call light within reach;encouraged to call for assist;exit alarm on;with other staff  MD  -LF               User Key  (r) = Recorded By, (t) = Taken By, (c) = Cosigned By      Initials Name Provider Type     Elissa Montes, OT Occupational Therapist                   Outcome Measures       Row Name 11/05/24 1348          How much help from another is currently needed...    Putting on and taking off regular lower body clothing? 2  -LF     Bathing (including washing, rinsing, and drying) 3  -LF     Toileting (which includes using toilet bed pan or urinal) 2  -LF     Putting on and taking off regular upper body clothing 4  -LF     Taking care of personal grooming  (such as brushing teeth) 4  -LF     Eating meals 4  -LF     AM-PAC 6 Clicks Score (OT) 19  -LF       Row Name 11/05/24 1340          Functional Assessment    Outcome Measure Options AM-PAC 6 Clicks Daily Activity (OT);Optimal Instrument  -LF       Row Name 11/05/24 1340          Optimal Instrument    Optimal Instrument Optimal - 3  -LF     Bending/Stooping 4  -LF     Standing 2  -LF     Reaching 2  -LF     From the list, choose the 3 activities you would most like to be able to do without any difficulty Bending/stooping;Standing;Reaching  -LF     Total Score Optimal - 3 8  -LF               User Key  (r) = Recorded By, (t) = Taken By, (c) = Cosigned By      Initials Name Provider Type     Elissa Montes OT Occupational Therapist                    Occupational Therapy Education       Title: PT OT SLP Therapies (Done)       Topic: Occupational Therapy (Done)       Point: ADL training (Done)       Description:   Instruct learner(s) on proper safety adaptation and remediation techniques during self care or transfers.   Instruct in proper use of assistive devices.                  Learning Progress Summary            Patient Acceptance, E,D, DU by PG at 11/4/2024 0857                      Point: Home exercise program (Done)       Description:   Instruct learner(s) on appropriate technique for monitoring, assisting and/or progressing therapeutic exercises/activities.                  Learning Progress Summary            Patient Acceptance, E,D, DU by PG at 11/4/2024 0857                      Point: Precautions (Done)       Description:   Instruct learner(s) on prescribed precautions during self-care and functional transfers.                  Learning Progress Summary            Patient Acceptance, E,D, DU by PG at 11/4/2024 0857                      Point: Body mechanics (Done)       Description:   Instruct learner(s) on proper positioning and spine alignment during self-care, functional mobility activities and/or  exercises.                  Learning Progress Summary            Patient Acceptance, E,D, DU by PG at 11/4/2024 0857                                      User Key       Initials Effective Dates Name Provider Type Discipline    PG 06/16/21 -  Alex Boone OT Occupational Therapist OT                  OT Recommendation and Plan     Plan of Care Review  Plan of Care Reviewed With: patient  Progress: improving  Outcome Evaluation: Pt met while seated on BSC, completing toileting and LB dressing with max assist/dependent. She then completed functional mobility to the sink in prepartion for grooming but unable to complete in standing secondary to fatigue, completing oral care/combing her hair while seated in a standard chair at the sink. She would benefit from continued OT services until safe d/c.     Time Calculation:         Time Calculation- OT       Row Name 11/05/24 1340             Time Calculation- OT    OT Received On 11/05/24  -LF      OT Goal Re-Cert Due Date 11/13/24  -LF         Timed Charges    29883 - OT Therapeutic Activity Minutes 10  -LF      76527 - OT Self Care/Mgmt Minutes 15  -LF         Total Minutes    Timed Charges Total Minutes 25  -LF       Total Minutes 25  -LF                User Key  (r) = Recorded By, (t) = Taken By, (c) = Cosigned By      Initials Name Provider Type    LF Elissa Montes OT Occupational Therapist                  Therapy Charges for Today       Code Description Service Date Service Provider Modifiers Qty    52772303443 HC OT THERAPEUTIC ACT EA 15 MIN 11/5/2024 Elissa Montes OT GO 1    87235145965 HC OT SELF CARE/MGMT/TRAIN EA 15 MIN 11/5/2024 Elissa Montes OT GO 1                 Elissa Montes OT  11/5/2024

## 2024-11-06 ENCOUNTER — APPOINTMENT (OUTPATIENT)
Dept: ULTRASOUND IMAGING | Facility: HOSPITAL | Age: 89
DRG: 291 | End: 2024-11-06
Payer: MEDICARE

## 2024-11-06 LAB
ANION GAP SERPL CALCULATED.3IONS-SCNC: 7.7 MMOL/L (ref 5–15)
BACTERIA UR QL AUTO: ABNORMAL /HPF
BASOPHILS # BLD AUTO: 0.05 10*3/MM3 (ref 0–0.2)
BASOPHILS NFR BLD AUTO: 0.7 % (ref 0–1.5)
BILIRUB UR QL STRIP: NEGATIVE
BUN SERPL-MCNC: 50 MG/DL (ref 8–23)
BUN/CREAT SERPL: 22.2 (ref 7–25)
CALCIUM SPEC-SCNC: 9.3 MG/DL (ref 8.2–9.6)
CHLORIDE SERPL-SCNC: 94 MMOL/L (ref 98–107)
CLARITY UR: CLEAR
CO2 SERPL-SCNC: 35.3 MMOL/L (ref 22–29)
COLOR UR: YELLOW
CREAT SERPL-MCNC: 2.25 MG/DL (ref 0.57–1)
CREAT UR-MCNC: 112.2 MG/DL
DEPRECATED RDW RBC AUTO: 52 FL (ref 37–54)
EGFRCR SERPLBLD CKD-EPI 2021: 19.6 ML/MIN/1.73
EOSINOPHIL # BLD AUTO: 0.26 10*3/MM3 (ref 0–0.4)
EOSINOPHIL NFR BLD AUTO: 3.9 % (ref 0.3–6.2)
ERYTHROCYTE [DISTWIDTH] IN BLOOD BY AUTOMATED COUNT: 14.1 % (ref 12.3–15.4)
GLUCOSE SERPL-MCNC: 109 MG/DL (ref 65–99)
GLUCOSE UR STRIP-MCNC: NEGATIVE MG/DL
HCT VFR BLD AUTO: 43 % (ref 34–46.6)
HGB BLD-MCNC: 12.6 G/DL (ref 12–15.9)
HGB UR QL STRIP.AUTO: NEGATIVE
HYALINE CASTS UR QL AUTO: ABNORMAL /LPF
IMM GRANULOCYTES # BLD AUTO: 0.01 10*3/MM3 (ref 0–0.05)
IMM GRANULOCYTES NFR BLD AUTO: 0.1 % (ref 0–0.5)
KETONES UR QL STRIP: NEGATIVE
LEUKOCYTE ESTERASE UR QL STRIP.AUTO: ABNORMAL
LYMPHOCYTES # BLD AUTO: 1.5 10*3/MM3 (ref 0.7–3.1)
LYMPHOCYTES NFR BLD AUTO: 22.2 % (ref 19.6–45.3)
MAGNESIUM SERPL-MCNC: 2 MG/DL (ref 1.7–2.3)
MCH RBC QN AUTO: 29.4 PG (ref 26.6–33)
MCHC RBC AUTO-ENTMCNC: 29.3 G/DL (ref 31.5–35.7)
MCV RBC AUTO: 100.5 FL (ref 79–97)
MONOCYTES # BLD AUTO: 0.88 10*3/MM3 (ref 0.1–0.9)
MONOCYTES NFR BLD AUTO: 13 % (ref 5–12)
NEUTROPHILS NFR BLD AUTO: 4.05 10*3/MM3 (ref 1.7–7)
NEUTROPHILS NFR BLD AUTO: 60.1 % (ref 42.7–76)
NITRITE UR QL STRIP: POSITIVE
NRBC BLD AUTO-RTO: 0 /100 WBC (ref 0–0.2)
PH UR STRIP.AUTO: 6.5 [PH] (ref 5–8)
PHOSPHATE SERPL-MCNC: 5.1 MG/DL (ref 2.5–4.5)
PLATELET # BLD AUTO: 162 10*3/MM3 (ref 140–450)
PMV BLD AUTO: 11.5 FL (ref 6–12)
POTASSIUM SERPL-SCNC: 4.8 MMOL/L (ref 3.5–5.2)
PROT ?TM UR-MCNC: 34.8 MG/DL
PROT UR QL STRIP: ABNORMAL
PROT/CREAT UR: 0.31 MG/G{CREAT}
RBC # BLD AUTO: 4.28 10*6/MM3 (ref 3.77–5.28)
RBC # UR STRIP: ABNORMAL /HPF
REF LAB TEST METHOD: ABNORMAL
SODIUM SERPL-SCNC: 137 MMOL/L (ref 136–145)
SP GR UR STRIP: 1.01 (ref 1–1.03)
SQUAMOUS #/AREA URNS HPF: ABNORMAL /HPF
UROBILINOGEN UR QL STRIP: ABNORMAL
WBC # UR STRIP: ABNORMAL /HPF
WBC NRBC COR # BLD AUTO: 6.75 10*3/MM3 (ref 3.4–10.8)

## 2024-11-06 PROCEDURE — 25010000002 ENOXAPARIN PER 10 MG: Performed by: INTERNAL MEDICINE

## 2024-11-06 PROCEDURE — 83735 ASSAY OF MAGNESIUM: CPT | Performed by: INTERNAL MEDICINE

## 2024-11-06 PROCEDURE — 84156 ASSAY OF PROTEIN URINE: CPT | Performed by: INTERNAL MEDICINE

## 2024-11-06 PROCEDURE — 80048 BASIC METABOLIC PNL TOTAL CA: CPT | Performed by: INTERNAL MEDICINE

## 2024-11-06 PROCEDURE — 82570 ASSAY OF URINE CREATININE: CPT | Performed by: INTERNAL MEDICINE

## 2024-11-06 PROCEDURE — 84100 ASSAY OF PHOSPHORUS: CPT | Performed by: INTERNAL MEDICINE

## 2024-11-06 PROCEDURE — 99233 SBSQ HOSP IP/OBS HIGH 50: CPT | Performed by: INTERNAL MEDICINE

## 2024-11-06 PROCEDURE — 85025 COMPLETE CBC W/AUTO DIFF WBC: CPT | Performed by: INTERNAL MEDICINE

## 2024-11-06 PROCEDURE — 87186 SC STD MICRODIL/AGAR DIL: CPT | Performed by: PHYSICIAN ASSISTANT

## 2024-11-06 PROCEDURE — 25810000003 SODIUM CHLORIDE 0.9 % SOLUTION: Performed by: INTERNAL MEDICINE

## 2024-11-06 PROCEDURE — 87086 URINE CULTURE/COLONY COUNT: CPT | Performed by: PHYSICIAN ASSISTANT

## 2024-11-06 PROCEDURE — 81001 URINALYSIS AUTO W/SCOPE: CPT | Performed by: INTERNAL MEDICINE

## 2024-11-06 PROCEDURE — 99232 SBSQ HOSP IP/OBS MODERATE 35: CPT | Performed by: STUDENT IN AN ORGANIZED HEALTH CARE EDUCATION/TRAINING PROGRAM

## 2024-11-06 PROCEDURE — 76775 US EXAM ABDO BACK WALL LIM: CPT

## 2024-11-06 PROCEDURE — 87088 URINE BACTERIA CULTURE: CPT | Performed by: PHYSICIAN ASSISTANT

## 2024-11-06 RX ORDER — TRAMADOL HYDROCHLORIDE 50 MG/1
50 TABLET ORAL EVERY 6 HOURS PRN
Status: DISCONTINUED | OUTPATIENT
Start: 2024-11-06 | End: 2024-11-08 | Stop reason: HOSPADM

## 2024-11-06 RX ORDER — SODIUM CHLORIDE 9 MG/ML
75 INJECTION, SOLUTION INTRAVENOUS CONTINUOUS
Status: ACTIVE | OUTPATIENT
Start: 2024-11-06 | End: 2024-11-06

## 2024-11-06 RX ORDER — LIDOCAINE 4 G/G
2 PATCH TOPICAL
Status: DISCONTINUED | OUTPATIENT
Start: 2024-11-06 | End: 2024-11-08 | Stop reason: HOSPADM

## 2024-11-06 RX ADMIN — Medication 10 ML: at 22:05

## 2024-11-06 RX ADMIN — ENOXAPARIN SODIUM 30 MG: 100 INJECTION SUBCUTANEOUS at 09:48

## 2024-11-06 RX ADMIN — LEVOTHYROXINE SODIUM 50 MCG: 50 TABLET ORAL at 05:16

## 2024-11-06 RX ADMIN — SODIUM CHLORIDE 75 ML/HR: 9 INJECTION, SOLUTION INTRAVENOUS at 22:05

## 2024-11-06 RX ADMIN — Medication 10 ML: at 09:48

## 2024-11-06 RX ADMIN — LIDOCAINE 2 PATCH: 4 PATCH TOPICAL at 15:31

## 2024-11-06 RX ADMIN — TRAMADOL HYDROCHLORIDE 50 MG: 50 TABLET, COATED ORAL at 15:31

## 2024-11-06 RX ADMIN — SODIUM CHLORIDE 75 ML/HR: 9 INJECTION, SOLUTION INTRAVENOUS at 09:46

## 2024-11-06 NOTE — CASE MANAGEMENT/SOCIAL WORK
Discharge Planning Assessment  JG Lua     Patient Name: Loly Terrazas  MRN: [id number]  Today's Date: 11/6/2024    Admit Date: 11/1/2024    Plan: Pt lives with family. Pt has food support from family. Daughter drives Pt to and from need appts. Family at bedside, Pt does not like to go to the Dr or take much medication. Family denies fin. stressors at this time. Pt and family agreeable to sub acute rehab referrals. SW will make referrals. Pt will need skilled care followed by possible long term care. Referrals have been made.   Discharge Needs Assessment       Row Name 11/06/24 0715       Living Environment    People in Home child(lorne), adult    Current Living Arrangements home    Potentially Unsafe Housing Conditions none    In the past 12 months has the electric, gas, oil, or water company threatened to shut off services in your home? No    Primary Care Provided by self;child(lorne)    Provides Primary Care For no one, unable/limited ability to care for self    Family Caregiver if Needed child(lorne), adult    Quality of Family Relationships helpful;involved    Able to Return to Prior Arrangements no    Living Arrangement Comments Family states Pt will need In Pt rehab with the possibility of LTC       Resource/Environmental Concerns    Resource/Environmental Concerns none    Transportation Concerns none       Transportation Needs    In the past 12 months, has lack of transportation kept you from medical appointments or from getting medications? no    In the past 12 months, has lack of transportation kept you from meetings, work, or from getting things needed for daily living? No       Food Insecurity    Within the past 12 months, you worried that your food would run out before you got the money to buy more. Never true    Within the past 12 months, the food you bought just didn't last and you didn't have money to get more. Never true       Transition Planning    Patient/Family Anticipates Transition to inpatient  rehabilitation facility;long-term care facility    Patient/Family Anticipated Services at Transition none       Discharge Needs Assessment    Readmission Within the Last 30 Days no previous admission in last 30 days    Equipment Currently Used at Home cane, straight;wheelchair;walker, rolling;commode;shower chair    Concerns to be Addressed discharge planning    Do you want help finding or keeping work or a job? I do not need or want help    Do you want help with school or training? For example, starting or completing job training or getting a high school diploma, GED or equivalent No    Anticipated Changes Related to Illness none    Equipment Needed After Discharge none    Discharge Coordination/Progress Pt lives with family. Pt has food support from family. Daughter drives Pt to and from need appts. Family at bedside, Pt does not like to go to the Dr or take much medication. Family denies fin. stressors at this time. Pt and family agreeable to sub acute rehab referrals. SW will make referrals. Pt will need skilled care followed by possible long term care. Referrals have been made.                   Discharge Plan       Row Name 11/06/24 0718       Plan    Plan Pt lives with family. Pt has food support from family. Daughter drives Pt to and from need appts. Family at bedside, Pt does not like to go to the Dr or take much medication. Family denies fin. stressors at this time. Pt and family agreeable to sub acute rehab referrals. SW will make referrals. Pt will need skilled care followed by possible long term care. Referrals have been made.                  Continued Care and Services - Admitted Since 11/1/2024       Destination       Service Provider Request Status Services Address Phone Fax Patient Preferred    [facility] Pending - Request Sent -- [address] [place] [place] [address] [phone] --       Current Capacity last updated by Fredy Berumen on 9/23/2024  1528    61             [facility] Pending - Request Sent -- [address], [place] [address] [phone] --    [facility] Pending - Request Sent -- [address] [place] [id number] [phone] --    [facility] Pending - Request Sent -- [address] [place] [id number] [phone] --    [facility] - Request Sent [facility] [address] [facility] [address] [phone] --    [facility] Pending - Request Sent -- [address] [place] [address] [phone] --                     Demographic Summary       Row Name 11/06/24 0714       General Information    Admission Type inpatient    Arrived From emergency department    Referral Source admission list    Reason for Consult discharge planning    Preferred Language English       Contact Information    Permission Granted to Share Info With lay caregiver    Contact Information Obtained for lay caregiver       Lay Caregiver Information    Name, Lay Caregiver Linnea Farmer    Phone, Lay Caregiver [phone]                   Functional Status       Row Name 11/06/24 0714       Functional Status    Usual Activity Tolerance fair    Current Activity Tolerance fair       Physical Activity    On average, how many days per week do you engage in moderate to strenuous exercise (like a brisk walk)? 0 days    On average, how many minutes do you engage in exercise at this level? 0 min    Number of minutes of exercise per week 0       Assessment of Health Literacy    How often do you have someone help you read hospital materials? Occasionally    How often do you have problems learning about your medical condition because of difficulty understanding written information? Occasionally    How often do you have a problem understanding what is told to you  about your medical condition? Occasionally    How confident are you filling out medical forms by yourself? Somewhat    Health Literacy Moderate       Functional Status, IADL    Medications assistive person    Meal Preparation assistive person    Housekeeping assistive person    Laundry assistive person    Shopping assistive person    If for any reason you need help with day-to-day activities such as bathing, preparing meals, shopping, managing finances, etc., do you get the help you need? I get all the help I need       Mental Status    General Appearance WDL WDL       Mental Status Summary    Recent Changes in Mental Status/Cognitive Functioning no changes       Employment/    Employment Status retired                   Psychosocial    No documentation.                  Abuse/Neglect    No documentation.                  Legal       Row Name 11/06/24 0715       Financial Resource Strain    How hard is it for you to pay for the very basics like food, housing, medical care, and heating? Not hard       Financial/Legal    Source of Income social security    Application for Public Assistance not applied       Legal    Criminal Activity/Legal Involvement none                   Substance Abuse    No documentation.                  Patient Forms    No documentation.                     Ellen Harden

## 2024-11-06 NOTE — PLAN OF CARE
Goal Outcome Evaluation:              Outcome Evaluation: Pt c/o pain recieved  new orders for Ultram, administered and pt slept comfortably. VSS

## 2024-11-06 NOTE — CONSULTS
[facility]   Nephrology Consult Note      Patient Name: Loly Terrazas  : 6/10/1929  MRN: [id number]  Primary Care Physician:  Keisha Sullivan APRN  Referring Physician: No ref. provider found  Date of admission: 2024    Subjective   Subjective     Reason for Consult/ Chief Complaint: Acute kidney injury    HPI:  Loly Terrazas is a 89 y.o. female with history of CHF with preserved EF and probably mild CKD who was admitted with shortness of breath.  She was evaluated by cardiology, diuresed initially but developed hypotension.  Creatinine has risen from 0.9 up to 2.25 today.  Baseline within normal range.  She was given some IV fluid, Aldactone, ARB are on hold.  Not receiving diuretics at this time.  Patient was seen this morning, this is a late note entry.  She is complaining of neck pain and back ache.  She was also having some shortness of breath but no chest pain.  Urine output not recorded last 24 hours.  Records reviewed.  No nausea or vomiting, no diarrhea.    Review of Systems   All systems were reviewed and negative except for: What is mentioned above.    Personal History     Past Medical History:   Diagnosis Date    Allergic rhinitis due to allergen 2018    Anemia 2019    Anxiety disorder 10/26/2020    Arthritis     COPD (chronic obstructive pulmonary disease) 2018    DDD (degenerative disc disease), lumbar 2019    Deafness     Dyspnea 2019    Essential hypertension 2018    Fall 2019    SEAN (generalized anxiety disorder) 2020    Gait disturbance 2018    History of stroke 2018    Hypothyroidism 2018    Insomnia 2019    Joint pain 10/26/2020    Low hemoglobin 2019    Lumbago 2019    Lumbar herniated disc     L5-S1    Memory change 2018    Renal insufficiency 10/26/2020    Scoliosis     Sinus node dysfunction     Statin intolerance 2019    Tremor     Vein disorder 2018       Past Surgical  History:   Procedure Laterality Date    CHOLECYSTECTOMY      LEG SURGERY Right     Fracture with pins    THYROIDECTOMY         Family History: family history is not on file. Otherwise pertinent FHx was reviewed and not pertinent to current issue.    Social History:  reports that she quit smoking about 45 years ago. Her smoking use included cigarettes. She started smoking about 75 years ago. She has a 7.5 pack-year smoking history. She has never used smokeless tobacco. She reports current alcohol use of about 1.0 standard drink of alcohol per week. She reports that she does not use drugs.    Home Medications:  atenolol, furosemide, levothyroxine, meclizine, mirtazapine, and vitamin B-12    Allergies:  Allergies   Allergen Reactions    Pneumococcal Vac Polyvalent Swelling       Objective    Objective     Vitals:   Temp:  [98.1 °F (36.7 °C)-98.6 °F (37 °C)] 98.1 °F (36.7 °C)  Heart Rate:  [73-84] 84  Resp:  [20-28] 28  BP: (104-130)/(45-56) 107/45  Flow (L/min) (Oxygen Therapy):  [3] 3     Physical Exam    Constitutional: Awake, alert, mildly hard of hearing.   Eyes: sclerae anicteric, no conjunctival injection   HENT: mucous membranes moist   Neck: Supple, no thyromegaly, no lymphadenopathy, trachea midline, No JVD   Respiratory: Clear to auscultation bilaterally, nonlabored respirations    Cardiovascular: RRR, no murmurs, rubs, or gallops.   Gastrointestinal: Positive bowel sounds, soft, nontender, nondistended   Musculoskeletal: No edema, no clubbing or cyanosis   Psychiatric: Appropriate affect, cooperative   Neurologic: Oriented x 3, moving all extremities, Cranial Nerves grossly intact, speech clear   Skin: warm and dry, no rashes    Pure wick in place, canister empty.    Result Review    Result Reviewed:  I have personally reviewed the results from the time of this admission to 11/6/2024 13:24 EST and agree with these findings:  [x]  Laboratory  []  Microbiology  [x]  Radiology  []  EKG/Telemetry   []   Cardiology/Vascular   []  Pathology  [x]  Old records  []  Other:  LAB RESULTS:    LAB RESULTS:        Lab 11/06/24  0407 11/05/24  0427 11/04/24  0407 11/03/24  1612 11/03/24  0413 11/02/24  1809 11/02/24  0406 11/01/24  1540   SODIUM 137 139 141  --  144  --  145 147*   POTASSIUM 4.8 5.1 4.1 4.4 3.4* 3.9 2.7* 3.0*   CHLORIDE 94* 93* 96*  --  96*  --  98 101   CO2 35.3* 38.3* 37.0*  --  42.3*  --  40.5* 37.0*   BUN 50* 37* 23  --  16  --  11 12   CREATININE 2.25* 1.95* 1.17*  --  0.95  --  0.77 0.92   GLUCOSE 109* 117* 129*  --  105*  --  129* 147*   EGFR 19.6* 23.3* 43.1*  --  55.3*  --  71.1 57.5*   ANION GAP 7.7 7.7 8.0  --  5.7  --  6.5 9.0   MAGNESIUM 2.0 1.9 1.7  --  1.7  --  1.8 1.6*   PHOSPHORUS 5.1*  --   --   --   --   --   --   --            Most notable findings include: As above.  2D echo showed diastolic dysfunction with LVH, hyperdynamic LV.    Assessment & Plan   Assessment / Plan     Brief Patient Summary:  Loly Terrazas is a 89 y.o. female who is admitted with shortness of breath which is attributed to decompensated diastolic heart failure, developed acute kidney injury.    Active Hospital Problems:  Active Hospital Problems    Diagnosis     **Heart failure        Assessment and Plan:   - Acute kidney injury, oliguric most likely secondary to hypotension and intravascular volume depletion.  Hypotension resolved now.  She is status post 1 L of LR.  Will resume IV fluid in form of normal saline at 75 cc an hour x 1 L.  Continue to hold Aldactone and ARB and Lasix.  Will check renal ultrasound, strict I's and O's, obtain urinalysis and quantify proteinuria and obtain bladder scan.  Further workup may be needed.  - Probable underlying chronic kidney disease stage III with advanced age.  - Diastolic dysfunction/congestive heart failure and paroxysmal SVT, per cardiology.  - Hypertension with hypotension, DC amlodipine for now and monitor, avoid hypotension.  The above discussed with her.  Will  follow    Thank you very much for this consult!    Electronically signed by Manoj Jain MD, 11/6/2024, 13:24 EST.

## 2024-11-06 NOTE — PROGRESS NOTES
" [facility]   Hospitalist Progress Note  Date: 2024  Patient Name: Loly Terrazas  : 6/10/1929  MRN: [id number]  Date of admission: 2024  Room/Bed: 260/1      Subjective   Subjective     Chief Complaint: short of air     Summary:Loly Terrazas is a 89 y.o. female  with past medical history of COPD, hypertension, CVA, and hypothyroidism who presents with shortness of breath for a month  The patient's history was obtained from her and her daughters at bedside.  Patient's had some leg swelling and some shortness of breath for about 1 month.  However over the last 1 to 2 weeks has gotten progressively worse.  She has orthopnea.  She has PND.  Worsening lower extremity edema.  In the emergency department the patient's vital signs are as follows: Temperature  98.1, pulse 51, respiratory rate was 20, blood pressure 220/66, 85% on room air.  CBC shows no acute abnormalities.  CMP shows normal creatinine.  Sodium is 147.  BNP is 3000 with previous BNP of 419 and ..  Chest x-ray shows cardiomegaly suggestive cardiomyopathy and/or pericardial effusion.  Likely small right pleural effusion with adjacent atelectasis and/or pneumonia.  Patient was given potassium.  She is given 40 of IV Lasix.  Patient's heart failure symptoms are most likely due to diastolic dysfunction from severe hypertension.  She was given Nitropaste if this does not reduce her pressure effectively they will need to started on nitroglycerin drip.  Patient to the hospital for heart failure with unknown EF due to hypertensive emergency causing acute hypoxic respiratory failure.    Interval Followup:   No acute events overnight.  Still states she feels \"bad,\" meaning weak and fatigued.  Still gets short of breath with exertion.  Still requiring supplemental oxygen.     Objective   Objective     Vitals:   Temp:  [97.5 °F (36.4 °C)-98.6 °F (37 °C)] 98.2 °F (36.8 °C)  Heart Rate:  [73-84] 84  Resp:  [20-28] 28  BP: (104-130)/(49-81) 121/50  Flow " (L/min) (Oxygen Therapy):  [3] 3    Physical Exam   General: Awake, alert, NAD, sitting up in bedside chair  HENT: NCAT, MMM  Cardiovascular: RRR, systolic murmur appreciated  Pulmonary: Diminished in bilateral bases, otherwise clear, nasal cannula in place  Gastrointestinal: S/ND/NT, +BS  Musculoskeletal: No gross deformities  Skin: No jaundice, no rash on exposed skin appreciated  Neuro: CN II through XII grossly intact; speech clear; no tremor    Result Review    I have personally reviewed these results:  [x]  Laboratory personally reviewed BMP, CBC, magnesium      Lab 11/06/24 0407 11/05/24 0427 11/04/24  0407 11/02/24  0406 11/01/24  1540   WBC 6.75 11.48* 10.32 7.34 6.49   HEMOGLOBIN 12.6 13.1 13.4 11.7* 12.1   HEMATOCRIT 43.0 44.6 44.9 39.2 39.8   PLATELETS 162 185 176 142 146   NEUTROS ABS 4.05  --   --  4.99 4.78   IMMATURE GRANS (ABS) 0.01  --   --  0.02 0.02   LYMPHS ABS 1.50  --   --  1.50 1.06   MONOS ABS 0.88  --   --  0.74 0.57   EOS ABS 0.26  --   --  0.07 0.03   .5* 99.3* 98.2* 99.0* 99.3*         Lab 11/06/24 0407 11/05/24  0427 11/04/24  0407 11/03/24  1612 11/03/24  0413 11/02/24  0406 11/01/24  1540   SODIUM 137 139 141  --  144   < > 147*   POTASSIUM 4.8 5.1 4.1   < > 3.4*   < > 3.0*   CHLORIDE 94* 93* 96*  --  96*   < > 101   CO2 35.3* 38.3* 37.0*  --  42.3*   < > 37.0*   ANION GAP 7.7 7.7 8.0  --  5.7   < > 9.0   BUN 50* 37* 23  --  16   < > 12   CREATININE 2.25* 1.95* 1.17*  --  0.95   < > 0.92   EGFR 19.6* 23.3* 43.1*  --  55.3*   < > 57.5*   GLUCOSE 109* 117* 129*  --  105*   < > 147*   CALCIUM 9.3 9.8* 9.8*  --  9.5   < > 9.4   MAGNESIUM 2.0 1.9 1.7  --  1.7   < > 1.6*   PHOSPHORUS 5.1*  --   --   --   --   --   --    HEMOGLOBIN A1C  --   --   --   --  5.50  --   --    TSH  --   --   --   --   --   --  2.060    < > = values in this interval not displayed.         Lab 11/02/24  0406 11/01/24  1540   TOTAL PROTEIN 6.3 6.9   ALBUMIN 3.8 4.2   GLOBULIN 2.5 2.7   ALT (SGPT) 12 14    AST (SGOT) 20 21   BILIRUBIN 1.5* 1.4*   ALK PHOS 71 76         Lab 11/01/24  1540   PROBNP 3,121.0*   HSTROP T 32*                 Brief Urine Lab Results       None          [x]  Microbiology   [x]  Radiology  []  EKG/Telemetry   []  Cardiology/Vascular   []  Pathology  []  Old records  []  Other:    Assessment & Plan   Assessment / Plan   Hypotension  NANETTE  Acute exacerbation of diastolic congestive heart failure  Advanced age with debility  Hypertension  Hypothyroidism  Moderate TR  Paroxysmal SVT    Continue to monitor in the hospital for workup and management of the above  Discussed with cardiology-patient was given a total of 500 cc of fluid, renal function continued to worsen  Will consult nephrology for assistance  Continue to hold losartan, diuretics, hydralazine and Aldactone today  Monitor renal function closely  IV Ativan if needed for severe anxiety/agitation  Continue to wean oxygen as tolerated  Continue appropriate home medications  Palliative care has been consulted-patient is DNR/DNI.  Plan is to attempt rehab before considering hospice  PT/OT following-recommend rehab  Social work aware and sending a referral  Trend renal function and electrolytes with a.m. BMP, magnesium   Trend Hgb and WBC with a.m. CBC     Discussed with RN, cardiology, nephrology    VTE Prophylaxis:  Pharmacologic VTE prophylaxis orders are present.        CODE STATUS:   Medical Intervention Limits: No intubation (DNI)  Code Status (Patient has no pulse and is not breathing): No CPR (Do Not Attempt to Resuscitate)  Medical Interventions (Patient has pulse or is breathing): Limited Support

## 2024-11-06 NOTE — PLAN OF CARE
Goal Outcome Evaluation:  Plan of Care Reviewed With: patient              Patient resting in bed. Arouses to voice. No signs of acute distress present and patient has no concerns at this time. Currently alert and oriented x4. Call light within reach.

## 2024-11-06 NOTE — PROGRESS NOTES
[facility]  INPATIENT PROGRESS NOTE         [address]    11/6/2024      PATIENT IDENTIFICATION:   Name:  Loly Terrazas      MRN:  [id number]     89 y.o.  female               Chief complaint shortness of breath   SUBJECTIVE: patient states that she is having worsening of breathing again.  OBJECTIVE:  Vitals:    11/06/24 0619 11/06/24 0755 11/06/24 1110 11/06/24 1516   BP:  121/50 107/45 124/45   BP Location:  Left arm Left arm Left arm   Patient Position:  Lying Lying Lying   Pulse:  84 84 89   Resp:  28 28 24   Temp:  98.2 °F (36.8 °C) 98.1 °F (36.7 °C) 98.6 °F (37 °C)   TempSrc:  Oral Oral Oral   SpO2:  (!) 88% 94% 91%   Weight: 63.8 kg (140 lb 10.5 oz)      Height:               Body mass index is 25.73 kg/m².    Intake/Output Summary (Last 24 hours) at 11/6/2024 1824  Last data filed at 11/6/2024 1642  Gross per 24 hour   Intake 270 ml   Output 650 ml   Net -380 ml       Telemetry: Sinus pause, intermittent PVCs, normal sinus rhythm    Physical Exam  Constitutional: Not in distress  Neck: No carotid bruit, hepatojugular reflux or JVD.   Cardiovascular:      Rate and Rhythm: Normal rate and regular rhythm.,  Murmurs are present.  Pulmonary: Pulmonary effort is normal. Normal breath sounds. No wheezing, rhonchi or rales.   Extremities: Minimal bilateral edema is noted.   Skin: Warm and dry. Non cyanotic, No petechiae or rash.   Neurological: Alert and oriented x 3        Allergies   Allergen Reactions    Pneumococcal Vac Polyvalent Swelling     Scheduled meds:  enoxaparin, 30 mg, Subcutaneous, Daily  levothyroxine, 50 mcg, Oral, Q AM  Lidocaine, 2 patch, Transdermal, Q24H  [Held by provider] losartan, 25 mg, Oral, Q24H  sodium chloride, 10 mL, Intravenous, Q12H  [Held by provider] spironolactone, 25 mg, Oral, Daily      IV meds:                      sodium chloride, 75 mL/hr, Last Rate: 75 mL/hr (11/06/24 0946)      Data Review:  CBC          11/4/2024    04:07 11/5/2024    04:27 11/6/2024     "04:07   CBC   WBC 10.32  11.48  6.75    RBC 4.57  4.49  4.28    Hemoglobin 13.4  13.1  12.6    Hematocrit 44.9  44.6  43.0    MCV 98.2  99.3  100.5    MCH 29.3  29.2  29.4    MCHC 29.8  29.4  29.3    RDW 14.3  14.3  14.1    Platelets 176  185  162      CMP          11/4/2024    04:07 11/5/2024    04:27 11/6/2024    04:07   CMP   Glucose 129  117  109    BUN 23  37  50    Creatinine 1.17  1.95  2.25    EGFR 43.1  23.3  19.6    Sodium 141  139  137    Potassium 4.1  5.1  4.8    Chloride 96  93  94    Calcium 9.8  9.8  9.3    BUN/Creatinine Ratio 19.7  19.0  22.2    Anion Gap 8.0  7.7  7.7       CARDIAC LABS:      Lab 11/01/24  1540   PROBNP 3,121.0*   HSTROP T 32*        No results found for: \"DIGOXIN\"   Lab Results   Component Value Date    TSH 2.060 11/01/2024           Invalid input(s): \"LDLCALC\"  No results found for: \"POCTROP\"  Lab Results   Component Value Date    TROPONINT 32 (H) 11/01/2024   (  Lab Results   Component Value Date    MG 2.0 11/06/2024     Results for orders placed during the hospital encounter of 11/01/24    Adult Transthoracic Echo Complete W/ Cont if Necessary Per Protocol    Interpretation Summary  Severe biatrial enlargement is present.  RV is dilated.  However LV has normal size.  LV has mild concentric hypertrophy.  No regional wall motion abnormalities are noted.  LV systolic function is hyperdynamic.  Estimated LVEF is greater than 70%.  Near obliteration of LV cavity is present.  Diastolic function cannot be accurately graded in the presence of LVH.  At least moderately abnormal diastolic function is present  LVOT Doppler jet is symmetric.  Gradient is not concerning for LVOT obstruction..  Aortic valve is trileaflet.  Leaflets are calcified.  Mild aortic stenosis is noted.  Mild to moderate aortic regurgitation is present  Mitral valve has thickened leaflets with preserved excursion.  MAC is present.  Mild to moderate MR is present  Tricuspid valve is not well-visualized.  Moderate to " severe TR is noted.  Calculated RVSP is 40 mmHg plus right atrial pressure.  Hepatic vein/IVC Doppler is not included in the study.  Mild to moderate pulmonary regurgitation is present.  Small pericardial effusion is noted  IVC is dilated.  Estimated right atrial pressure is greater than 15 mmHg.    No prior study is available for comparison.           ASSESSMENT:  Heart failure with preserved ejection fraction  Small hemodynamically insignificant pericardial effusion  Moderate TR  Calcific mitral valve degeneration  Paroxysmal SVT    PLAN:  Patient's echocardiogram was discussed with patient's daughter.  She has more than 1 degenerative valvular disease, effecting the hemodynamics therefore it is challenging to optimize guideline directed medical therapy.      The risk of kidney injury was high with optimizing guideline directed medical therapy. Unfortunately, patient has developed NANETTE, has been holding diuresis for > 48 hours.     Nephrology nephrology team is assisting we are currently desisting we are currently doing continuous fluids at 75 cc an hour.  Despite fluid boluses creatinine has not improved.  Urine output is picking up.  Creatinine will eventually get better.      Continue weight based diuresis only, if necessary to relieve shortness of breath.  Continue Losartan when creatinine improves or starts downtrending  to optimally control blood pressure  Continue amlodipine for optimal blood pressure control.    Palliative care is on board.  Blood pressure is optimally controlled  Patient is exhibiting SVTs.  Due to concern for significant bradycardia we will currently not initiate beta-blocker therapy    Kevin Oh MD  11/6/2024    18:24 EST

## 2024-11-06 NOTE — PROGRESS NOTES
[facility]  INPATIENT PROGRESS NOTE         [address]    11/5/2024      PATIENT IDENTIFICATION:   Name:  Loly Terrazas      MRN:  [id number]     89 y.o.  female               CC; SOB  SUBJECTIVE: Patient was sleeping when she was seen today.   OBJECTIVE:  Vitals:    11/05/24 0813 11/05/24 0820 11/05/24 1212 11/05/24 1541   BP: (!) 83/34 (!) 78/34 113/81 104/49   BP Location: Left arm Left arm Right arm Right arm   Patient Position: Sitting Sitting Sitting Lying   Pulse: 82 86 78 75   Resp: 24  22 20   Temp: 97.7 °F (36.5 °C)  97.5 °F (36.4 °C) 98.1 °F (36.7 °C)   TempSrc: Oral  Oral Oral   SpO2: 91%  99% 95%   Weight:       Height:               Body mass index is 25.65 kg/m².    Intake/Output Summary (Last 24 hours) at 11/5/2024 2009  Last data filed at 11/5/2024 1813  Gross per 24 hour   Intake 360 ml   Output --   Net 360 ml       Telemetry: Sinus pause, intermittent PVCs, normal sinus rhythm    Physical Exam  Constitutional: Not in distress  Neck: No carotid bruit, hepatojugular reflux or JVD.   Cardiovascular:      Rate and Rhythm: Normal rate and regular rhythm.,  Murmurs are present.  Pulmonary: Pulmonary effort is normal. Normal breath sounds. No wheezing, rhonchi or rales.   Extremities: Minimal bilateral edema is noted.   Skin: Warm and dry. Non cyanotic, No petechiae or rash.   Neurological: Alert and oriented x 3        Allergies   Allergen Reactions    Pneumococcal Vac Polyvalent Swelling     Scheduled meds:  [START ON 11/6/2024] enoxaparin, 30 mg, Subcutaneous, Daily  levothyroxine, 50 mcg, Oral, Q AM  [Held by provider] losartan, 25 mg, Oral, Q24H  sodium chloride, 10 mL, Intravenous, Q12H  [Held by provider] spironolactone, 25 mg, Oral, Daily      IV meds:                      lactated ringers, 100 mL/hr      Data Review:  CBC          11/2/2024    04:06 11/4/2024    04:07 11/5/2024    04:27   CBC   WBC 7.34  10.32  11.48    RBC 3.96  4.57  4.49    Hemoglobin 11.7  13.4  13.1   "  Hematocrit 39.2  44.9  44.6    MCV 99.0  98.2  99.3    MCH 29.5  29.3  29.2    MCHC 29.8  29.8  29.4    RDW 14.6  14.3  14.3    Platelets 142  176  185      CMP          11/3/2024    04:13 11/3/2024    16:12 11/4/2024    04:07 11/5/2024    04:27   CMP   Glucose 105   129  117    BUN 16   23  37    Creatinine 0.95   1.17  1.95    EGFR 55.3   43.1  23.3    Sodium 144   141  139    Potassium 3.4  4.4  4.1  5.1    Chloride 96   96  93    Calcium 9.5   9.8  9.8    BUN/Creatinine Ratio 16.8   19.7  19.0    Anion Gap 5.7   8.0  7.7       CARDIAC LABS:      Lab 11/01/24  1540   PROBNP 3,121.0*   HSTROP T 32*        No results found for: \"DIGOXIN\"   Lab Results   Component Value Date    TSH 2.060 11/01/2024           Invalid input(s): \"LDLCALC\"  No results found for: \"POCTROP\"  Lab Results   Component Value Date    TROPONINT 32 (H) 11/01/2024   (  Lab Results   Component Value Date    MG 1.9 11/05/2024     Results for orders placed during the hospital encounter of 11/01/24    Adult Transthoracic Echo Complete W/ Cont if Necessary Per Protocol    Interpretation Summary  Severe biatrial enlargement is present.  RV is dilated.  However LV has normal size.  LV has mild concentric hypertrophy.  No regional wall motion abnormalities are noted.  LV systolic function is hyperdynamic.  Estimated LVEF is greater than 70%.  Near obliteration of LV cavity is present.  Diastolic function cannot be accurately graded in the presence of LVH.  At least moderately abnormal diastolic function is present  LVOT Doppler jet is symmetric.  Gradient is not concerning for LVOT obstruction..  Aortic valve is trileaflet.  Leaflets are calcified.  Mild aortic stenosis is noted.  Mild to moderate aortic regurgitation is present  Mitral valve has thickened leaflets with preserved excursion.  MAC is present.  Mild to moderate MR is present  Tricuspid valve is not well-visualized.  Moderate to severe TR is noted.  Calculated RVSP is 40 mmHg plus right " atrial pressure.  Hepatic vein/IVC Doppler is not included in the study.  Mild to moderate pulmonary regurgitation is present.  Small pericardial effusion is noted  IVC is dilated.  Estimated right atrial pressure is greater than 15 mmHg.    No prior study is available for comparison.           ASSESSMENT:  Heart failure with preserved ejection fraction  Small hemodynamically insignificant pericardial effusion  Moderate TR  Calcific mitral valve degeneration  Paroxysmal SVT    PLAN:  Patient's echocardiogram was discussed with patient's daughter.  She has more than 1 degenerative valvular disease, effecting the hemodynamics therefore it is challenging to optimize guideline directed medical therapy.      The risk of kidney injury is high with optimizing guideline directed medical therapy.  Unfortunately, patient has developed NANETTE, has been holding diuresis for 48 hours.   Administered fluids 500 cc at 100 cc an hour, will reassess in the AM.    Continue weight based diuresis only.  Continue Losartan when creatinine improves or starts downtrending  to optimally control blood pressure  Continue amlodipine for optimal blood pressure control.    Palliative care is on board.  Blood pressure is optimally controlled  Patient is exhibiting SVTs.  Due to concern for significant bradycardia we will currently not initiate beta-blocker therapy    Kevin Oh MD  11/5/2024    20:09 EST

## 2024-11-06 NOTE — NURSING NOTE
Spoke with CM prior to pt who reports that family is now requesting LTC placement. Met with daughter at bedside who reports that yes she would like placement for pt now and that they talked in detail last night. Provided information on [facility] so if daughter would like to reach out at a later time she has information. Palliative care will continue to follow to ensure no changes in status occur before placement is found. Spoke with RN. No needs at this time.         Meche RAMACHANDRAN RN  Palliative Care

## 2024-11-07 LAB
ANION GAP SERPL CALCULATED.3IONS-SCNC: 4.7 MMOL/L (ref 5–15)
BASOPHILS # BLD AUTO: 0.03 10*3/MM3 (ref 0–0.2)
BASOPHILS NFR BLD AUTO: 0.5 % (ref 0–1.5)
BUN SERPL-MCNC: 39 MG/DL (ref 8–23)
BUN/CREAT SERPL: 30.5 (ref 7–25)
CALCIUM SPEC-SCNC: 9.3 MG/DL (ref 8.2–9.6)
CHLORIDE SERPL-SCNC: 98 MMOL/L (ref 98–107)
CO2 SERPL-SCNC: 35.3 MMOL/L (ref 22–29)
CREAT SERPL-MCNC: 1.28 MG/DL (ref 0.57–1)
DEPRECATED RDW RBC AUTO: 53.1 FL (ref 37–54)
EGFRCR SERPLBLD CKD-EPI 2021: 38.7 ML/MIN/1.73
EOSINOPHIL # BLD AUTO: 0.19 10*3/MM3 (ref 0–0.4)
EOSINOPHIL NFR BLD AUTO: 3.1 % (ref 0.3–6.2)
ERYTHROCYTE [DISTWIDTH] IN BLOOD BY AUTOMATED COUNT: 14 % (ref 12.3–15.4)
GLUCOSE SERPL-MCNC: 112 MG/DL (ref 65–99)
HCT VFR BLD AUTO: 40.1 % (ref 34–46.6)
HGB BLD-MCNC: 11.8 G/DL (ref 12–15.9)
IMM GRANULOCYTES # BLD AUTO: 0.02 10*3/MM3 (ref 0–0.05)
IMM GRANULOCYTES NFR BLD AUTO: 0.3 % (ref 0–0.5)
LYMPHOCYTES # BLD AUTO: 1.26 10*3/MM3 (ref 0.7–3.1)
LYMPHOCYTES NFR BLD AUTO: 20.4 % (ref 19.6–45.3)
MAGNESIUM SERPL-MCNC: 1.9 MG/DL (ref 1.7–2.3)
MCH RBC QN AUTO: 30.1 PG (ref 26.6–33)
MCHC RBC AUTO-ENTMCNC: 29.4 G/DL (ref 31.5–35.7)
MCV RBC AUTO: 102.3 FL (ref 79–97)
MONOCYTES # BLD AUTO: 0.65 10*3/MM3 (ref 0.1–0.9)
MONOCYTES NFR BLD AUTO: 10.5 % (ref 5–12)
NEUTROPHILS NFR BLD AUTO: 4.04 10*3/MM3 (ref 1.7–7)
NEUTROPHILS NFR BLD AUTO: 65.2 % (ref 42.7–76)
NRBC BLD AUTO-RTO: 0 /100 WBC (ref 0–0.2)
PHOSPHATE SERPL-MCNC: 2.8 MG/DL (ref 2.5–4.5)
PLATELET # BLD AUTO: 164 10*3/MM3 (ref 140–450)
PMV BLD AUTO: 11.1 FL (ref 6–12)
POTASSIUM SERPL-SCNC: 4.8 MMOL/L (ref 3.5–5.2)
RBC # BLD AUTO: 3.92 10*6/MM3 (ref 3.77–5.28)
SODIUM SERPL-SCNC: 138 MMOL/L (ref 136–145)
WBC NRBC COR # BLD AUTO: 6.19 10*3/MM3 (ref 3.4–10.8)

## 2024-11-07 PROCEDURE — 99232 SBSQ HOSP IP/OBS MODERATE 35: CPT | Performed by: INTERNAL MEDICINE

## 2024-11-07 PROCEDURE — 84100 ASSAY OF PHOSPHORUS: CPT | Performed by: INTERNAL MEDICINE

## 2024-11-07 PROCEDURE — 97110 THERAPEUTIC EXERCISES: CPT

## 2024-11-07 PROCEDURE — 25010000002 ENOXAPARIN PER 10 MG: Performed by: INTERNAL MEDICINE

## 2024-11-07 PROCEDURE — 85025 COMPLETE CBC W/AUTO DIFF WBC: CPT | Performed by: INTERNAL MEDICINE

## 2024-11-07 PROCEDURE — 83735 ASSAY OF MAGNESIUM: CPT | Performed by: INTERNAL MEDICINE

## 2024-11-07 PROCEDURE — 97535 SELF CARE MNGMENT TRAINING: CPT

## 2024-11-07 PROCEDURE — 80048 BASIC METABOLIC PNL TOTAL CA: CPT | Performed by: INTERNAL MEDICINE

## 2024-11-07 PROCEDURE — 25010000002 CEFTRIAXONE PER 250 MG: Performed by: PHYSICIAN ASSISTANT

## 2024-11-07 RX ADMIN — TRAMADOL HYDROCHLORIDE 50 MG: 50 TABLET, COATED ORAL at 21:05

## 2024-11-07 RX ADMIN — LIDOCAINE 2 PATCH: 4 PATCH TOPICAL at 09:30

## 2024-11-07 RX ADMIN — LEVOTHYROXINE SODIUM 50 MCG: 50 TABLET ORAL at 05:46

## 2024-11-07 RX ADMIN — Medication 1000 MG: at 05:46

## 2024-11-07 RX ADMIN — ENOXAPARIN SODIUM 30 MG: 100 INJECTION SUBCUTANEOUS at 09:30

## 2024-11-07 RX ADMIN — Medication 10 ML: at 21:05

## 2024-11-07 NOTE — THERAPY TREATMENT NOTE
Acute Care - Physical Therapy Treatment Note  JG Lua     Patient Name: Loly Terrazas  : 6/10/1929  MRN: [id number]  Today's Date: 2024   Onset of Illness/Injury or Date of Surgery: 24  Visit Dx:     ICD-10-CM ICD-9-CM   1. Acute congestive heart failure, unspecified heart failure type  I50.9 428.0   2. 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    History of stroke    Hypothyroidism    Insomnia, unspecified    Memory change    Renal insufficiency    Scoliosis    Statin intolerance    Dizziness    Mixed hyperlipidemia    Heart failure     Past Medical History:   Diagnosis Date    Allergic rhinitis due to allergen 2018    Anemia 2019    Anxiety disorder 10/26/2020    Arthritis     COPD (chronic obstructive pulmonary disease) 2018    DDD (degenerative disc disease), lumbar 2019    Deafness     Dyspnea 2019    Essential hypertension 2018    Fall 2019    SEAN (generalized anxiety disorder) 2020    Gait disturbance 2018    History of stroke 2018    Hypothyroidism 2018    Insomnia 2019    Joint pain 10/26/2020    Low hemoglobin 2019    Lumbago 2019    Lumbar herniated disc     L5-S1    Memory change 2018    Renal insufficiency 10/26/2020    Scoliosis     Sinus node dysfunction     Statin intolerance 2019    Tremor     Vein disorder 2018     Past Surgical History:   Procedure Laterality Date    CHOLECYSTECTOMY      LEG SURGERY Right     Fracture with pins    THYROIDECTOMY       PT Assessment (Last 12 Hours)       PT Evaluation and Treatment       Row Name 24 1458           Physical Therapy Time and Intention    Document Type therapy note (daily note) (P)   -IF     Mode of Treatment individual therapy;physical therapy (P)   -IF     Patient Effort good (P)   -IF     Symptoms Noted During/After Treatment none (P)   -IF       Row Name 11/07/24 1458          General Information    Patient Profile Reviewed yes (P)   -IF     Patient Observations agree to therapy;cooperative;alert (P)   -IF       Row Name 11/07/24 1458          Bed Mobility    Bed Mobility supine-sit (P)   -IF     Supine-Sit [place] (Bed Mobility) standby assist (P)   -IF     Assistive Device (Bed Mobility) head of bed elevated;bed rails (P)   -IF       Row Name 11/07/24 1458          Transfers    Transfers sit-stand transfer (P)   -IF       Row Name 11/07/24 1458          Sit-Stand Transfer    Sit-Stand [place] (Transfers) contact guard (P)   -IF     Assistive Device (Sit-Stand Transfers) walker, front-wheeled (P)   -IF       Row Name 11/07/24 1458          Stand-Sit Transfer    Stand-Sit [place] (Transfers) contact guard (P)   -IF     Assistive Device (Stand-Sit Transfers) walker, front-wheeled (P)   -IF       Row Name 11/07/24 1458          Gait/Stairs (Locomotion)    Gait/Stairs Locomotion gait/ambulation assistive device (P)   -IF     [place] Level (Gait) contact guard (P)   -IF     Assistive Device (Gait) walker, front-wheeled (P)   -IF     Patient was able to Ambulate yes (P)   -IF     Distance in Feet (Gait) 10 (P)   -IF       Row Name 11/07/24 1458          Balance    Balance Assessment standing dynamic balance (P)   -IF     Dynamic Standing Balance contact guard (P)   -IF     Position/Device Used, Standing Balance supported;walker, front-wheeled (P)   -IF       Row Name 11/07/24 1458          Plan of Care Review    Plan of Care Reviewed With patient (P)   -IF     Progress improving (P)   -IF     Outcome Evaluation Pt was able to ambulate 10' CGA to navigate from her bed, around her room and  into her recliner. Pt was able to perform STS activity with CGA demonstrating improvements in stability. Pt continued to become fatigued with ambulation and demonstrates continued strength deficits that would benefit from continued skilled interventions. Pt tolerated all interventions well. (P)   -IF       Row Name 11/07/24 1458          Positioning and Restraints    Pre-Treatment Position in bed (P)   -IF     Post Treatment Position chair (P)   -IF     In Chair exit alarm on;encouraged to call for assist;call light within reach (P)   -IF       Row Name 11/07/24 1458          Progress Summary (PT)    Progress Toward Functional Goals (PT) progress toward functional goals is good (P)   -IF               User Key  (r) = Recorded By, (t) = Taken By, (c) = Cosigned By      Initials Name Provider Type    IF Merna Kumar, PT Student PT Student                    Physical Therapy Education       Title: PT OT SLP Therapies (Done)       Topic: Physical Therapy (Done)       Point: Mobility training (Done)       Learning Progress Summary            Patient Acceptance, E,TB, VU by  at 11/4/2024 1145                      Point: Home exercise program (Done)       Learning Progress Summary            Patient Acceptance, E,TB, VU by EW at 11/4/2024 1145                      Point: Body mechanics (Done)       Learning Progress Summary            Patient Acceptance, E,TB, VU by EW at 11/4/2024 1145                      Point: Precautions (Done)       Learning Progress Summary            Patient Acceptance, E,TB, VU by EW at 11/4/2024 1145                                      User Key       Initials Effective Dates Name Provider Type Discipline     08/27/24 -  Delfino Mcnulty, PT Student PT Student PT                  PT Recommendation and Plan     Progress Summary (PT)  Progress Toward Functional Goals (PT): (P) progress toward functional goals is good  Plan of Care Reviewed With: (P) patient  Progress: (P) improving  Outcome  Evaluation: (P) Pt was able to ambulate 10' CGA to navigate from her bed, around her room and into her recliner. Pt was able to perform STS activity with CGA demonstrating improvements in stability. Pt continued to become fatigued with ambulation and demonstrates continued strength deficits that would benefit from continued skilled interventions. Pt tolerated all interventions well.   Outcome Measures       Row Name 11/07/24 1503             How much help from another person do you currently need...    Turning from your back to your side while in flat bed without using bedrails? 4 (P)   -IF      Moving from lying on back to sitting on the side of a flat bed without bedrails? 4 (P)   -IF      Moving to and from a bed to a chair (including a wheelchair)? 3 (P)   -IF      Standing up from a chair using your arms (e.g., wheelchair, bedside chair)? 3 (P)   -IF      Climbing 3-5 steps with a railing? 2 (P)   -IF      To walk in hospital room? 3 (P)   -IF      AM-PAC 6 Clicks Score (PT) 19 (P)   -IF                User Key  (r) = Recorded By, (t) = Taken By, (c) = Cosigned By      Initials Name Provider Type    IF Merna Kumar PT Student PT Student                     Time Calculation:    PT Charges       Row Name 11/07/24 1504             Time Calculation    PT Received On 11/07/24 (P)   -IF         Timed Charges    31856 - PT Therapeutic Exercise Minutes 15 (P)   -IF         Total Minutes    Timed Charges Total Minutes 15 (P)   -IF       Total Minutes 15 (P)   -IF                User Key  (r) = Recorded By, (t) = Taken By, (c) = Cosigned By      Initials Name Provider Type    IF Merna Kumar PT Student PT Student                  Therapy Charges for Today       Code Description Service Date Service Provider Modifiers Qty    96528601593 HC PT THER PROC EA 15 MIN 11/7/2024 Merna Kumar PT Student GP 1            PT G-Codes  Outcome Measure Options: AM-PAC 6 Clicks Daily Activity (OT), Optimal  Instrument  AM-PAC 6 Clicks Score (PT): (P) 19  AM-PAC 6 Clicks Score (OT): 20    Merna Kumar, PT Student  11/7/2024

## 2024-11-07 NOTE — SIGNIFICANT NOTE
11/06/24 1300   Coping/Psychosocial   Observed Emotional State calm;cooperative   Verbalized Emotional State hopefulness   Trust Relationship/Rapport empathic listening provided   Family/Support Persons daughter;family   Involvement in Care interacting with patient   Additional Documentation Spiritual Care (Group)   Spiritual Care   Use of Spiritual Resources non-Buddhist use of spiritual care   Spiritual Care Source  initiative   Spiritual Care Follow-Up follow-up, none required as presently assessed   Response to Spiritual Care receptive of support;thanks expressed   Spiritual Care Interventions supportive conversation provided   Spiritual Care Visit Type initial   Receptivity to Spiritual Care visit welcomed

## 2024-11-07 NOTE — THERAPY TREATMENT NOTE
Patient Name: Loly Terrazas  : 6/10/1929    MRN: [id number]                              Today's Date: 2024       Admit Date: 2024    Visit Dx:     ICD-10-CM ICD-9-CM   1. Acute congestive heart failure, unspecified heart failure type  I50.9 428.0   2. 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    History of stroke    Hypothyroidism    Insomnia, unspecified    Memory change    Renal insufficiency    Scoliosis    Statin intolerance    Dizziness    Mixed hyperlipidemia    Heart failure     Past Medical History:   Diagnosis Date    Allergic rhinitis due to allergen 2018    Anemia 2019    Anxiety disorder 10/26/2020    Arthritis     COPD (chronic obstructive pulmonary disease) 2018    DDD (degenerative disc disease), lumbar 2019    Deafness     Dyspnea 2019    Essential hypertension 2018    Fall 2019    SEAN (generalized anxiety disorder) 2020    Gait disturbance 2018    History of stroke 2018    Hypothyroidism 2018    Insomnia 2019    Joint pain 10/26/2020    Low hemoglobin 2019    Lumbago 2019    Lumbar herniated disc     L5-S1    Memory change 2018    Renal insufficiency 10/26/2020    Scoliosis     Sinus node dysfunction     Statin intolerance 2019    Tremor     Vein disorder 2018     Past Surgical History:   Procedure Laterality Date    CHOLECYSTECTOMY      LEG SURGERY Right     Fracture with pins    THYROIDECTOMY        General Information       Row Name 24 1145          OT Time and Intention    Document Type therapy note (daily note)  -SC     Mode of Treatment  individual therapy;occupational therapy  -SC     Patient Effort good  -SC       Row Name 11/07/24 1145          General Information    Patient Profile Reviewed yes  -SC     Existing Precautions/Restrictions fall  -SC       Row Name 11/07/24 1145          Cognition    Orientation Status (Cognition) oriented x 3  -SC       Row Name 11/07/24 1145          Safety Issues/Impairments Affecting Functional Mobility    Impairments Affecting Function (Mobility) balance;endurance/activity tolerance;shortness of breath;strength  -SC               User Key  (r) = Recorded By, (t) = Taken By, (c) = Cosigned By      Initials Name Provider Type    SC Joy Biswas OT Occupational Therapist                     Mobility/ADL's       Row Name 11/07/24 1145          Bed Mobility    Bed Mobility supine-sit  -SC     Supine-Sit [place] (Bed Mobility) contact guard  -SC     Assistive Device (Bed Mobility) bed rails  -SC     Comment, (Bed Mobility) Instructed on use of bed rails .  -SC       Row Name 11/07/24 1145          Transfers    Transfers sit-stand transfer;stand-sit transfer;toilet transfer  -SC       Row Name 11/07/24 1145          Bed-Chair Transfer    Bed-Chair [place] (Transfers) contact guard;verbal cues  -SC     Assistive Device (Bed-Chair Transfers) walker, front-wheeled  -SC       Row Name 11/07/24 1145          Sit-Stand Transfer    Sit-Stand [place] (Transfers) contact guard  -SC     Assistive Device (Sit-Stand Transfers) walker, front-wheeled  -SC     Comment, (Sit-Stand Transfer) STS x 6 reps throughout exercise and basic ADL task  -SC       Row Name 11/07/24 1145          Stand-Sit Transfer    Stand-Sit [place] (Transfers) contact guard  -SC     Assistive Device (Stand-Sit Transfers) walker, front-wheeled  -SC       Row Name 11/07/24 1145          Toilet Transfer    Type (Toilet Transfer) sit-stand;stand-sit  -SC     [place] Level (Toilet Transfer) contact guard  -SC     Assistive Device (Toilet  Transfer) commode, 3-in-1;walker, front-wheeled  -SC     Comment, (Toilet Transfer) --  -SC       Row Name 11/07/24 1145          Functional Mobility    Functional Mobility- Ind. Level contact guard assist  -SC     Functional Mobility- Device walker, front-wheeled  -SC     Functional Mobility- Comment Patient able to take 3 steps forward,backward and laterally at CGA. Limited with mobility by multiple Iv lines. Patient able to complete a portion of UB strength exercise in standing for purpose of stand tolerance and increased stand balance for support of adl routine.  -SC       Row Name 11/07/24 1145          Activities of Daily Living    BADL Assessment/Intervention grooming;lower body dressing  -SC       Row Name 11/07/24 1145          Upper Body Dressing Assessment/Training    [place] Level (Upper Body Dressing) set up  -SC       Row Name 11/07/24 1145          Lower Body Dressing Assessment/Training    [place] Level (Lower Body Dressing) maximum assist (25% patient effort)  -SC     Position (Lower Body Dressing) unsupported sitting;supported standing  -SC       Row Name 11/07/24 1145          Grooming Assessment/Training    [place] Level (Grooming) grooming skills;hair care, combing/brushing;oral care regimen;set up  -SC       Row Name 11/07/24 1145          Toileting Assessment/Training    [place] Level (Toileting) minimum assist (75% patient effort)  -SC     Assistive Devices (Toileting) commode, 3-in-1  -SC     Comment, (Toileting) Min A for thouroughness  -SC               User Key  (r) = Recorded By, (t) = Taken By, (c) = Cosigned By      Initials Name Provider Type    SC Joy Biswas OT Occupational Therapist                   Obj/Interventions       Row Name 11/07/24 1155          Shoulder (Therapeutic Exercise)    Shoulder (Therapeutic Exercise) strengthening exercise  -SC     Shoulder Strengthening (Therapeutic Exercise) bilateral;flexion;extension;horizontal  aBduction/aDduction;resistance band;yellow;10 repetitions;sitting  UB exercise completed for increased activity tolerance  for support of ADLs.  -SC       Row Name 11/07/24 1155          Elbow/Forearm (Therapeutic Exercise)    Elbow/Forearm (Therapeutic Exercise) strengthening exercise  -SC     Elbow/Forearm Strengthening (Therapeutic Exercise) bilateral;flexion;extension;resistance band;10 repetitions;yellow;standing  -SC       Row Name 11/07/24 1155          Motor Skills    Therapeutic Exercise shoulder;elbow/forearm  -SC       Row Name 11/07/24 1155          Balance    Balance Interventions standing;dynamic;UE activity with balance activity  -SC     Comment, Balance CGA-Min A  -SC               User Key  (r) = Recorded By, (t) = Taken By, (c) = Cosigned By      Initials Name Provider Type    Joy Infante OT Occupational Therapist                   Goals/Plan    No documentation.                  Clinical Impression       [facility] Name 11/07/24 1201          Pain Assessment    Pretreatment Pain Rating 0/10 - no pain  -SC     Posttreatment Pain Rating 0/10 - no pain  -[facility] Name 11/07/24 1201          Plan of Care Review    Plan of Care Reviewed With patient  -SC     Progress improving  -SC     Outcome Evaluation Patient demonstrated good participation in OT this date. Note improved function for bed mobility and sit to stand from min assist to CGA. She will continue to benefit from skilled services to address strength, balance, and activity tolerance.  -SC       Row Name 11/07/24 1201          Positioning and Restraints    Pre-Treatment Position in bed  -SC     Post Treatment Position bed  -SC     In Bed encouraged to call for assist;call light within reach;exit alarm on  -SC               User Key  (r) = Recorded By, (t) = Taken By, (c) = Cosigned By      Initials Name Provider Type    Joy Infante OT Occupational Therapist                   Outcome Measures       Row Name 11/07/24 1205          How  much help from another is currently needed...    Putting on and taking off regular lower body clothing? 2  -SC     Bathing (including washing, rinsing, and drying) 3  -SC     Toileting (which includes using toilet bed pan or urinal) 3  -SC     Putting on and taking off regular upper body clothing 4  -SC     Taking care of personal grooming (such as brushing teeth) 4  -SC     Eating meals 4  -SC     AM-PAC 6 Clicks Score (OT) 20  -SC       Row Name 11/07/24 0900 11/07/24 0344       How much help from another person do you currently need...    Turning from your back to your side while in flat bed without using bedrails? 4  -RB 4  -AP    Moving from lying on back to sitting on the side of a flat bed without bedrails? 3  -RB 3  -AP    Moving to and from a bed to a chair (including a wheelchair)? 3  -RB 3  -AP    Standing up from a chair using your arms (e.g., wheelchair, bedside chair)? 3  -RB 3  -AP    Climbing 3-5 steps with a railing? 2  -RB 2  -AP    To walk in hospital room? 3  -RB 3  -AP    AM-PAC 6 Clicks Score (PT) 18  -RB 18  -AP    Highest Level of Mobility Goal 6 --> Walk 10 steps or more  -RB 6 --> Walk 10 steps or more  -AP      Row Name 11/07/24 1205          Functional Assessment    Outcome Measure Options AM-PAC 6 Clicks Daily Activity (OT);Optimal Instrument  -SC       Row Name 11/07/24 1205          Optimal Instrument    Optimal Instrument Optimal - 3  -SC     Bending/Stooping 4  -SC     Standing 2  -SC     Reaching 1  -SC               User Key  (r) = Recorded By, (t) = Taken By, (c) = Cosigned By      Initials Name Provider Type    Crystal Simpson, RN Registered Nurse    Joy Infante OT Occupational Therapist    Tea Conroy RN Registered Nurse                    Occupational Therapy Education       Title: PT OT SLP Therapies (Done)       Topic: Occupational Therapy (Done)       Point: ADL training (Done)       Description:   Instruct learner(s) on proper safety adaptation and  remediation techniques during self care or transfers.   Instruct in proper use of assistive devices.                  Learning Progress Summary            Patient Acceptance, E,D, DU by PG at 11/4/2024 0857                      Point: Home exercise program (Done)       Description:   Instruct learner(s) on appropriate technique for monitoring, assisting and/or progressing therapeutic exercises/activities.                  Learning Progress Summary            Patient Acceptance, E,D, DU by PG at 11/4/2024 0857                      Point: Precautions (Done)       Description:   Instruct learner(s) on prescribed precautions during self-care and functional transfers.                  Learning Progress Summary            Patient Acceptance, E,D, DU by PG at 11/4/2024 0857                      Point: Body mechanics (Done)       Description:   Instruct learner(s) on proper positioning and spine alignment during self-care, functional mobility activities and/or exercises.                  Learning Progress Summary            Patient Acceptance, E,D, DU by PG at 11/4/2024 0857                                      User Key       Initials Effective Dates Name Provider Type Discipline    PG 06/16/21 -  Alex Boone OT Occupational Therapist OT                  OT Recommendation and Plan     Plan of Care Review  Plan of Care Reviewed With: patient  Progress: improving  Outcome Evaluation: Patient demonstrated good participation in OT this date. Note improved function for bed mobility and sit to stand from min assist to CGA. She will continue to benefit from skilled services to address strength, balance, and activity tolerance.     Time Calculation:         Time Calculation- OT       Row Name 11/07/24 1206             Time Calculation- OT    OT Received On 11/07/24  -SC         Timed Charges    86089 - OT Therapeutic Exercise Minutes 14  -SC      51880 - OT Self Care/Mgmt Minutes 12  -SC         Total Minutes    Timed Charges  Total Minutes 26  -SC       Total Minutes 26  -SC                User Key  (r) = Recorded By, (t) = Taken By, (c) = Cosigned By      Initials Name Provider Type    SC Joy Biswas OT Occupational Therapist                  Therapy Charges for Today       Code Description Service Date Service Provider Modifiers Qty    87437612814  OT SELF CARE/MGMT/TRAIN EA 15 MIN 11/7/2024 Joy Biswas OT GO 1    [id number]  OT THER PROC EA 15 MIN 11/7/2024 Joy Biswas OT GO 1                 Joy Biswas OT  11/7/2024

## 2024-11-07 NOTE — PLAN OF CARE
Goal Outcome Evaluation:              Outcome Evaluation: VSS, Afib on the monitor. Denies pain. Call light is within reach. Bed alarm in place.

## 2024-11-07 NOTE — PLAN OF CARE
Problem: Adult Inpatient Plan of Care  Goal: Plan of Care Review  Outcome: Progressing  Flowsheets (Taken 11/7/2024 6178)  Progress: no change  Outcome Evaluation: Patient A&Ox4, VSS, tolerating IVF, sleeping in between care, on 3 LPM via NC, no s/s of distress, care ongoing  Plan of Care Reviewed With: patient   Goal Outcome Evaluation:  Plan of Care Reviewed With: patient        Progress: no change  Outcome Evaluation: Patient A&Ox4, VSS, tolerating IVF, sleeping in between care, on 3 LPM via NC, no s/s of distress, care ongoing

## 2024-11-07 NOTE — PROGRESS NOTES
" LOS: 6 days   Patient Care Team:  Keisha Sullivan APRN as PCP - General (Nurse Practitioner)    Chief Complaint: NANETTE    Subjective     History of Present Illness  Pt without any new complaints    Subjective    History taken from: patient chart RN    Objective     Vital Sign Min/Max for last 24 hours  Temp  Min: 98.1 °F (36.7 °C)  Max: 98.6 °F (37 °C)   BP  Min: 107/45  Max: 148/58   Pulse  Min: 76  Max: 89   Resp  Min: 16  Max: 28   SpO2  Min: 91 %  Max: 96 %   Flow (L/min) (Oxygen Therapy)  Min: 3  Max: 3   Weight  Min: 64 kg (141 lb 1.5 oz)  Max: 64 kg (141 lb 1.5 oz)     Flowsheet Rows      Flowsheet Row First Filed Value   Admission Height 157.5 cm (62\") Documented at 11/01/2024 1518   Admission Weight 71.7 kg (158 lb 1.1 oz) Documented at 11/01/2024 1518            I/O this shift:  In: 60 [P.O.:60]  Out: -   I/O last 3 completed shifts:  In: 930 [P.O.:930]  Out: 1100 [Urine:1100]    Objective:  General Appearance:  Comfortable.    Vital signs: (most recent): Blood pressure 133/57, pulse 79, temperature 98.2 °F (36.8 °C), temperature source Oral, resp. rate 16, height 157.5 cm (62\"), weight 64 kg (141 lb 1.5 oz), SpO2 96%.  Vital signs are normal.    HEENT: Normal HEENT exam.    Lungs:  Normal effort and normal respiratory rate.    Heart: Normal rate.  Regular rhythm.    Abdomen: Abdomen is soft.  Bowel sounds are normal.   There is no abdominal tenderness.     Extremities: Normal range of motion.  There is no dependent edema.    Pulses: Distal pulses are intact.    Neurological: Patient is alert and oriented to person, place and time.                Results Review:     I reviewed the patient's new clinical results.  I reviewed the patient's new imaging results and agree with the interpretation.  I reviewed the patient's other test results and agree with the interpretation    WBC WBC   Date Value Ref Range Status   11/07/2024 6.19 3.40 - 10.80 10*3/mm3 Final   11/06/2024 6.75 3.40 - 10.80 10*3/mm3 Final " "  11/05/2024 11.48 (H) 3.40 - 10.80 10*3/mm3 Final      HGB Hemoglobin   Date Value Ref Range Status   11/07/2024 11.8 (L) 12.0 - 15.9 g/dL Final   11/06/2024 12.6 12.0 - 15.9 g/dL Final   11/05/2024 13.1 12.0 - 15.9 g/dL Final      HCT Hematocrit   Date Value Ref Range Status   11/07/2024 40.1 34.0 - 46.6 % Final   11/06/2024 43.0 34.0 - 46.6 % Final   11/05/2024 44.6 34.0 - 46.6 % Final      Platlets No results found for: \"LABPLAT\"   MCV MCV   Date Value Ref Range Status   11/07/2024 102.3 (H) 79.0 - 97.0 fL Final   11/06/2024 100.5 (H) 79.0 - 97.0 fL Final   11/05/2024 99.3 (H) 79.0 - 97.0 fL Final          Sodium Sodium   Date Value Ref Range Status   11/07/2024 138 136 - 145 mmol/L Final   11/06/2024 137 136 - 145 mmol/L Final   11/05/2024 139 136 - 145 mmol/L Final      Potassium Potassium   Date Value Ref Range Status   11/07/2024 4.8 3.5 - 5.2 mmol/L Final   11/06/2024 4.8 3.5 - 5.2 mmol/L Final   11/05/2024 5.1 3.5 - 5.2 mmol/L Final      Chloride Chloride   Date Value Ref Range Status   11/07/2024 98 98 - 107 mmol/L Final   11/06/2024 94 (L) 98 - 107 mmol/L Final   11/05/2024 93 (L) 98 - 107 mmol/L Final      CO2 CO2   Date Value Ref Range Status   11/07/2024 35.3 (H) 22.0 - 29.0 mmol/L Final   11/06/2024 35.3 (H) 22.0 - 29.0 mmol/L Final   11/05/2024 38.3 (H) 22.0 - 29.0 mmol/L Final      BUN BUN   Date Value Ref Range Status   11/07/2024 39 (H) 8 - 23 mg/dL Final   11/06/2024 50 (H) 8 - 23 mg/dL Final   11/05/2024 37 (H) 8 - 23 mg/dL Final      Creatinine Creatinine   Date Value Ref Range Status   11/07/2024 1.28 (H) 0.57 - 1.00 mg/dL Final   11/06/2024 2.25 (H) 0.57 - 1.00 mg/dL Final   11/05/2024 1.95 (H) 0.57 - 1.00 mg/dL Final      Calcium Calcium   Date Value Ref Range Status   11/07/2024 9.3 8.2 - 9.6 mg/dL Final   11/06/2024 9.3 8.2 - 9.6 mg/dL Final   11/05/2024 9.8 (H) 8.2 - 9.6 mg/dL Final      PO4 No results found for: \"CAPO4\"   Albumin No results found for: \"ALBUMIN\"   Magnesium Magnesium " "  Date Value Ref Range Status   11/07/2024 1.9 1.7 - 2.3 mg/dL Final   11/06/2024 2.0 1.7 - 2.3 mg/dL Final   11/05/2024 1.9 1.7 - 2.3 mg/dL Final      Uric Acid No results found for: \"URICACID\"     Medication Review:   cefTRIAXone, 1,000 mg, Intravenous, Q24H  enoxaparin, 30 mg, Subcutaneous, Daily  levothyroxine, 50 mcg, Oral, Q AM  Lidocaine, 2 patch, Transdermal, Q24H  [Held by provider] losartan, 25 mg, Oral, Q24H  sodium chloride, 10 mL, Intravenous, Q12H  [Held by provider] spironolactone, 25 mg, Oral, Daily          Assessment & Plan       Heart failure      Assessment & Plan  - Acute kidney injury, oliguric most likely secondary to hypotension and intravascular volume depletion.  Hypotension resolved now.  She is status post 1 L of LR.   Continue to hold Aldactone and ARB and Lasix.  Creatinine improved to 1.28 today.  UA with 1+ protein, no blood.  USN with no acute abnormalities.  No changes.    - Probable underlying chronic kidney disease stage III with advanced age.    - Diastolic dysfunction/congestive heart failure and paroxysmal SVT, per cardiology.    - Hypertension with hypotension, hold amlodipine for now and monitor, avoid hypotension.  Cozaar and aldactone also on hold.    Benitez Gleason MD  11/07/24  09:42 EST          "

## 2024-11-07 NOTE — PROGRESS NOTES
[facility]   Hospitalist Progress Note  Date: 2024  Patient Name: Loly Terrazas  : 6/10/1929  MRN: [id number]  Date of admission: 2024  Room/Bed: 260/1      Subjective   Subjective     Chief Complaint: short of air     Summary:Loly Terrazas is a 89 y.o. female  with past medical history of COPD, hypertension, CVA, and hypothyroidism who presents with shortness of breath for a month  The patient's history was obtained from her and her daughters at bedside.  Patient's had some leg swelling and some shortness of breath for about 1 month.  However over the last 1 to 2 weeks has gotten progressively worse.  She has orthopnea.  She has PND.  Worsening lower extremity edema.  In the emergency department the patient's vital signs are as follows: Temperature  98.1, pulse 51, respiratory rate was 20, blood pressure 220/66, 85% on room air.  CBC shows no acute abnormalities.  CMP shows normal creatinine.  Sodium is 147.  BNP is 3000 with previous BNP of 419 and ..  Chest x-ray shows cardiomegaly suggestive cardiomyopathy and/or pericardial effusion.  Likely small right pleural effusion with adjacent atelectasis and/or pneumonia.  Patient was given potassium.  She is given 40 of IV Lasix.  Patient's heart failure symptoms are most likely due to diastolic dysfunction from severe hypertension.  She was given Nitropaste if this does not reduce her pressure effectively they will need to started on nitroglycerin drip.  Patient to the hospital for heart failure with unknown EF due to hypertensive emergency causing acute hypoxic respiratory failure.    Interval Followup:   No acute events overnight.  States she feels a little bit better than yesterday.  Still short of breath. Continues to require supplemental oxygen.    Objective   Objective     Vitals:   Temp:  [98.1 °F (36.7 °C)-98.6 °F (37 °C)] 98.2 °F (36.8 °C)  Heart Rate:  [76-89] 83  Resp:  [16-24] 16  BP: (124-148)/(45-63) 138/63  Flow (L/min) (Oxygen  Therapy):  [3] 3    Physical Exam   General: Awake, alert, NAD, resting in bed  HENT: NCAT, MMM  Cardiovascular: RRR, systolic murmur appreciated  Pulmonary: Diminished in bilateral bases, otherwise clear, nasal cannula in place  Gastrointestinal: S/ND/NT, +BS  Musculoskeletal: No gross deformities  Skin: No jaundice, no rash on exposed skin appreciated  Neuro: CN II through XII grossly intact; speech clear; no tremor    Result Review    I have personally reviewed these results:  [x]  Laboratory personally reviewed BMP, CBC, magnesium      Lab 11/07/24 0414 11/06/24 0407 11/05/24 0427 11/04/24  0407 11/02/24  0406   WBC 6.19 6.75 11.48*   < > 7.34   HEMOGLOBIN 11.8* 12.6 13.1   < > 11.7*   HEMATOCRIT 40.1 43.0 44.6   < > 39.2   PLATELETS 164 162 185   < > 142   NEUTROS ABS 4.04 4.05  --   --  4.99   IMMATURE GRANS (ABS) 0.02 0.01  --   --  0.02   LYMPHS ABS 1.26 1.50  --   --  1.50   MONOS ABS 0.65 0.88  --   --  0.74   EOS ABS 0.19 0.26  --   --  0.07   .3* 100.5* 99.3*   < > 99.0*    < > = values in this interval not displayed.         Lab 11/07/24 0414 11/06/24  0407 11/05/24  0427 11/03/24  1612 11/03/24  0413 11/02/24  0406 11/01/24  1540   SODIUM 138 137 139   < > 144   < > 147*   POTASSIUM 4.8 4.8 5.1   < > 3.4*   < > 3.0*   CHLORIDE 98 94* 93*   < > 96*   < > 101   CO2 35.3* 35.3* 38.3*   < > 42.3*   < > 37.0*   ANION GAP 4.7* 7.7 7.7   < > 5.7   < > 9.0   BUN 39* 50* 37*   < > 16   < > 12   CREATININE 1.28* 2.25* 1.95*   < > 0.95   < > 0.92   EGFR 38.7* 19.6* 23.3*   < > 55.3*   < > 57.5*   GLUCOSE 112* 109* 117*   < > 105*   < > 147*   CALCIUM 9.3 9.3 9.8*   < > 9.5   < > 9.4   MAGNESIUM 1.9 2.0 1.9   < > 1.7   < > 1.6*   PHOSPHORUS 2.8 5.1*  --   --   --   --   --    HEMOGLOBIN A1C  --   --   --   --  5.50  --   --    TSH  --   --   --   --   --   --  2.060    < > = values in this interval not displayed.         Lab 11/02/24  0406 11/01/24  1540   TOTAL PROTEIN 6.3 6.9   ALBUMIN 3.8 4.2    GLOBULIN 2.5 2.7   ALT (SGPT) 12 14   AST (SGOT) 20 21   BILIRUBIN 1.5* 1.4*   ALK PHOS 71 76         Lab 11/01/24  1540   PROBNP 3,121.0*   HSTROP T 32*                 Brief Urine Lab Results  (Last result in the past 365 days)        Color   Clarity   Blood   Leuk Est   Nitrite   Protein   CREAT   Urine HCG        11/06/24 1139             112.2               [x]  Microbiology   [x]  Radiology  []  EKG/Telemetry   []  Cardiology/Vascular   []  Pathology  []  Old records  []  Other:    Assessment & Plan   Assessment / Plan   Hypotension  NANETTE  UTI due to unknown bacterial source, presumed gram-negative  Acute exacerbation of diastolic congestive heart failure  Advanced age with debility  Hypertension  Hypothyroidism  Moderate TR  Paroxysmal SVT    Continue to monitor in the hospital for workup and management of the above  Discussed with nephrology-renal function improving, discontinue IV fluids  Urinalysis concerning for UTI, continue with IV Rocephin for now and follow-up urine culture  Continue to hold Aldactone and losartan  Continue to hold hydralazine  Monitor renal function closely  Renal ultrasound reviewed showing cortical echogenicity consistent with underlying glomerular disease   IV Ativan if needed for severe anxiety/agitation  Continue to wean oxygen as tolerated  Continue appropriate home medications  Ultram as needed for breakthrough pain  PT/OT following-recommend rehab  Social work aware and sending a referral  Trend renal function and electrolytes with a.m. BMP, magnesium   Trend Hgb and WBC with a.m. CBC     Discussed with RN, cardiology, nephrology    VTE Prophylaxis:  Pharmacologic VTE prophylaxis orders are present.        CODE STATUS:   Medical Intervention Limits: No intubation (DNI)  Code Status (Patient has no pulse and is not breathing): No CPR (Do Not Attempt to Resuscitate)  Medical Interventions (Patient has pulse or is breathing): Limited Support

## 2024-11-08 VITALS
RESPIRATION RATE: 16 BRPM | SYSTOLIC BLOOD PRESSURE: 140 MMHG | TEMPERATURE: 97.9 F | DIASTOLIC BLOOD PRESSURE: 74 MMHG | HEIGHT: 62 IN | WEIGHT: 146.39 LBS | BODY MASS INDEX: 26.94 KG/M2 | OXYGEN SATURATION: 95 % | HEART RATE: 100 BPM

## 2024-11-08 LAB
ANION GAP SERPL CALCULATED.3IONS-SCNC: 8.2 MMOL/L (ref 5–15)
BASOPHILS # BLD AUTO: 0.02 10*3/MM3 (ref 0–0.2)
BASOPHILS NFR BLD AUTO: 0.4 % (ref 0–1.5)
BUN SERPL-MCNC: 21 MG/DL (ref 8–23)
BUN/CREAT SERPL: 24.4 (ref 7–25)
CALCIUM SPEC-SCNC: 9.1 MG/DL (ref 8.2–9.6)
CHLORIDE SERPL-SCNC: 100 MMOL/L (ref 98–107)
CO2 SERPL-SCNC: 31.8 MMOL/L (ref 22–29)
CREAT SERPL-MCNC: 0.86 MG/DL (ref 0.57–1)
DEPRECATED RDW RBC AUTO: 52.1 FL (ref 37–54)
EGFRCR SERPLBLD CKD-EPI 2021: 62.3 ML/MIN/1.73
EOSINOPHIL # BLD AUTO: 0.19 10*3/MM3 (ref 0–0.4)
EOSINOPHIL NFR BLD AUTO: 3.8 % (ref 0.3–6.2)
ERYTHROCYTE [DISTWIDTH] IN BLOOD BY AUTOMATED COUNT: 13.7 % (ref 12.3–15.4)
GLUCOSE SERPL-MCNC: 101 MG/DL (ref 65–99)
HCT VFR BLD AUTO: 38.5 % (ref 34–46.6)
HGB BLD-MCNC: 11.3 G/DL (ref 12–15.9)
IMM GRANULOCYTES # BLD AUTO: 0.01 10*3/MM3 (ref 0–0.05)
IMM GRANULOCYTES NFR BLD AUTO: 0.2 % (ref 0–0.5)
LYMPHOCYTES # BLD AUTO: 1.26 10*3/MM3 (ref 0.7–3.1)
LYMPHOCYTES NFR BLD AUTO: 25 % (ref 19.6–45.3)
MAGNESIUM SERPL-MCNC: 1.7 MG/DL (ref 1.7–2.3)
MCH RBC QN AUTO: 30.1 PG (ref 26.6–33)
MCHC RBC AUTO-ENTMCNC: 29.4 G/DL (ref 31.5–35.7)
MCV RBC AUTO: 102.4 FL (ref 79–97)
MONOCYTES # BLD AUTO: 0.68 10*3/MM3 (ref 0.1–0.9)
MONOCYTES NFR BLD AUTO: 13.5 % (ref 5–12)
NEUTROPHILS NFR BLD AUTO: 2.87 10*3/MM3 (ref 1.7–7)
NEUTROPHILS NFR BLD AUTO: 57.1 % (ref 42.7–76)
NRBC BLD AUTO-RTO: 0 /100 WBC (ref 0–0.2)
PHOSPHATE SERPL-MCNC: 2.3 MG/DL (ref 2.5–4.5)
PLATELET # BLD AUTO: 166 10*3/MM3 (ref 140–450)
PMV BLD AUTO: 11.4 FL (ref 6–12)
POTASSIUM SERPL-SCNC: 4.2 MMOL/L (ref 3.5–5.2)
RBC # BLD AUTO: 3.76 10*6/MM3 (ref 3.77–5.28)
SODIUM SERPL-SCNC: 140 MMOL/L (ref 136–145)
WBC NRBC COR # BLD AUTO: 5.03 10*3/MM3 (ref 3.4–10.8)

## 2024-11-08 PROCEDURE — 83735 ASSAY OF MAGNESIUM: CPT | Performed by: INTERNAL MEDICINE

## 2024-11-08 PROCEDURE — 97116 GAIT TRAINING THERAPY: CPT

## 2024-11-08 PROCEDURE — 25010000002 ENOXAPARIN PER 10 MG: Performed by: INTERNAL MEDICINE

## 2024-11-08 PROCEDURE — 97110 THERAPEUTIC EXERCISES: CPT

## 2024-11-08 PROCEDURE — 80048 BASIC METABOLIC PNL TOTAL CA: CPT | Performed by: INTERNAL MEDICINE

## 2024-11-08 PROCEDURE — 99232 SBSQ HOSP IP/OBS MODERATE 35: CPT | Performed by: STUDENT IN AN ORGANIZED HEALTH CARE EDUCATION/TRAINING PROGRAM

## 2024-11-08 PROCEDURE — 85025 COMPLETE CBC W/AUTO DIFF WBC: CPT | Performed by: INTERNAL MEDICINE

## 2024-11-08 PROCEDURE — 25010000002 CEFTRIAXONE PER 250 MG: Performed by: PHYSICIAN ASSISTANT

## 2024-11-08 PROCEDURE — 99239 HOSP IP/OBS DSCHRG MGMT >30: CPT | Performed by: INTERNAL MEDICINE

## 2024-11-08 PROCEDURE — 84100 ASSAY OF PHOSPHORUS: CPT | Performed by: INTERNAL MEDICINE

## 2024-11-08 RX ORDER — AMLODIPINE BESYLATE 5 MG/1
5 TABLET ORAL
Status: DISCONTINUED | OUTPATIENT
Start: 2024-11-09 | End: 2024-11-08 | Stop reason: HOSPADM

## 2024-11-08 RX ORDER — AMLODIPINE BESYLATE 5 MG/1
2.5 TABLET ORAL
Status: DISCONTINUED | OUTPATIENT
Start: 2024-11-08 | End: 2024-11-08

## 2024-11-08 RX ORDER — FUROSEMIDE 20 MG/1
20 TABLET ORAL DAILY
Status: DISCONTINUED | OUTPATIENT
Start: 2024-11-09 | End: 2024-11-08 | Stop reason: HOSPADM

## 2024-11-08 RX ORDER — CEFDINIR 300 MG/1
300 CAPSULE ORAL 2 TIMES DAILY
Qty: 8 CAPSULE | Refills: 0 | Status: SHIPPED | OUTPATIENT
Start: 2024-11-08 | End: 2024-11-12

## 2024-11-08 RX ORDER — TRAMADOL HYDROCHLORIDE 50 MG/1
50 TABLET ORAL EVERY 6 HOURS PRN
Qty: 12 TABLET | Refills: 0 | Status: SHIPPED | OUTPATIENT
Start: 2024-11-08 | End: 2024-11-11

## 2024-11-08 RX ORDER — AMLODIPINE BESYLATE 5 MG/1
5 TABLET ORAL
Qty: 30 TABLET | Refills: 0 | Status: SHIPPED | OUTPATIENT
Start: 2024-11-09

## 2024-11-08 RX ORDER — LOSARTAN POTASSIUM 25 MG/1
25 TABLET ORAL 2 TIMES DAILY
Qty: 60 TABLET | Refills: 0 | Status: SHIPPED | OUTPATIENT
Start: 2024-11-08

## 2024-11-08 RX ORDER — LOSARTAN POTASSIUM 25 MG/1
25 TABLET ORAL 2 TIMES DAILY
Status: DISCONTINUED | OUTPATIENT
Start: 2024-11-08 | End: 2024-11-08 | Stop reason: HOSPADM

## 2024-11-08 RX ORDER — LOSARTAN POTASSIUM 25 MG/1
12.5 TABLET ORAL 2 TIMES DAILY
Status: DISCONTINUED | OUTPATIENT
Start: 2024-11-08 | End: 2024-11-08

## 2024-11-08 RX ORDER — FUROSEMIDE 40 MG/1
40 TABLET ORAL ONCE
Status: COMPLETED | OUTPATIENT
Start: 2024-11-08 | End: 2024-11-08

## 2024-11-08 RX ADMIN — Medication 1000 MG: at 04:42

## 2024-11-08 RX ADMIN — LEVOTHYROXINE SODIUM 50 MCG: 50 TABLET ORAL at 04:41

## 2024-11-08 RX ADMIN — LOSARTAN POTASSIUM 12.5 MG: 25 TABLET, FILM COATED ORAL at 09:40

## 2024-11-08 RX ADMIN — Medication 10 ML: at 09:40

## 2024-11-08 RX ADMIN — AMLODIPINE BESYLATE 2.5 MG: 5 TABLET ORAL at 09:40

## 2024-11-08 RX ADMIN — LIDOCAINE 2 PATCH: 4 PATCH TOPICAL at 09:39

## 2024-11-08 RX ADMIN — ENOXAPARIN SODIUM 30 MG: 100 INJECTION SUBCUTANEOUS at 09:39

## 2024-11-08 RX ADMIN — FUROSEMIDE 40 MG: 40 TABLET ORAL at 09:40

## 2024-11-08 RX ADMIN — TRAMADOL HYDROCHLORIDE 50 MG: 50 TABLET, COATED ORAL at 04:41

## 2024-11-08 NOTE — PROGRESS NOTES
[facility]  INPATIENT PROGRESS NOTE         [address]    11/8/2024      PATIENT IDENTIFICATION:   Name:  Loly Terrazas      MRN:  [id number]     89 y.o.  female               Chief complaint shortness of breath   SUBJECTIVE: Shortness of breath is particularly better now.  OBJECTIVE:  Vitals:    11/08/24 0100 11/08/24 0430 11/08/24 0558 11/08/24 0735   BP: (!) 190/66 162/62  153/67   BP Location: Right arm Right arm  Right arm   Patient Position: Lying Lying  Lying   Pulse: 81 84  73   Resp: 16 16  16   Temp: 97.8 °F (36.6 °C) 97.5 °F (36.4 °C)  98.1 °F (36.7 °C)   TempSrc: Oral Oral  Oral   SpO2: 95% 94%  95%   Weight:   66.4 kg (146 lb 6.2 oz)    Height:               Body mass index is 26.77 kg/m².    Intake/Output Summary (Last 24 hours) at 11/8/2024 1209  Last data filed at 11/8/2024 0442  Gross per 24 hour   Intake 370 ml   Output 750 ml   Net -380 ml       Telemetry: Sinus pause, intermittent PVCs, normal sinus rhythm    Physical Exam  Constitutional: Not in distress  Neck: No carotid bruit, hepatojugular reflux or JVD.   Cardiovascular:      Rate and Rhythm: Normal rate and regular rhythm.,  Murmurs are present.  Pulmonary: Pulmonary effort is normal. Normal breath sounds. No wheezing, rhonchi or rales.   Extremities: Minimal bilateral edema is noted.   Skin: Warm and dry. Non cyanotic, No petechiae or rash.   Neurological: Alert and oriented x 3        Allergies   Allergen Reactions    Pneumococcal Vac Polyvalent Swelling     Scheduled meds:  amLODIPine, 2.5 mg, Oral, Q24H  cefTRIAXone, 1,000 mg, Intravenous, Q24H  enoxaparin, 30 mg, Subcutaneous, Daily  [START ON 11/9/2024] furosemide, 20 mg, Oral, Daily  levothyroxine, 50 mcg, Oral, Q AM  Lidocaine, 2 patch, Transdermal, Q24H  losartan, 12.5 mg, Oral, BID  sodium chloride, 10 mL, Intravenous, Q12H  [Held by provider] spironolactone, 25 mg, Oral, Daily      IV meds:                           Data Review:  CBC          11/6/2024    04:07  "11/7/2024    04:14 11/8/2024    04:19   CBC   WBC 6.75  6.19  5.03    RBC 4.28  3.92  3.76    Hemoglobin 12.6  11.8  11.3    Hematocrit 43.0  40.1  38.5    .5  102.3  102.4    MCH 29.4  30.1  30.1    MCHC 29.3  29.4  29.4    RDW 14.1  14.0  13.7    Platelets 162  164  166      CMP          11/6/2024    04:07 11/7/2024    04:14 11/8/2024    04:19   CMP   Glucose 109  112  101    BUN 50  39  21    Creatinine 2.25  1.28  0.86    EGFR 19.6  38.7  62.3    Sodium 137  138  140    Potassium 4.8  4.8  4.2    Chloride 94  98  100    Calcium 9.3  9.3  9.1    BUN/Creatinine Ratio 22.2  30.5  24.4    Anion Gap 7.7  4.7  8.2       CARDIAC LABS:      Lab 11/01/24  1540   PROBNP 3,121.0*   HSTROP T 32*        No results found for: \"DIGOXIN\"   Lab Results   Component Value Date    TSH 2.060 11/01/2024           Invalid input(s): \"LDLCALC\"  No results found for: \"POCTROP\"  Lab Results   Component Value Date    TROPONINT 32 (H) 11/01/2024   (  Lab Results   Component Value Date    MG 1.7 11/08/2024     Results for orders placed during the hospital encounter of 11/01/24    Adult Transthoracic Echo Complete W/ Cont if Necessary Per Protocol    Interpretation Summary  Severe biatrial enlargement is present.  RV is dilated.  However LV has normal size.  LV has mild concentric hypertrophy.  No regional wall motion abnormalities are noted.  LV systolic function is hyperdynamic.  Estimated LVEF is greater than 70%.  Near obliteration of LV cavity is present.  Diastolic function cannot be accurately graded in the presence of LVH.  At least moderately abnormal diastolic function is present  LVOT Doppler jet is symmetric.  Gradient is not concerning for LVOT obstruction..  Aortic valve is trileaflet.  Leaflets are calcified.  Mild aortic stenosis is noted.  Mild to moderate aortic regurgitation is present  Mitral valve has thickened leaflets with preserved excursion.  MAC is present.  Mild to moderate MR is present  Tricuspid valve is " not well-visualized.  Moderate to severe TR is noted.  Calculated RVSP is 40 mmHg plus right atrial pressure.  Hepatic vein/IVC Doppler is not included in the study.  Mild to moderate pulmonary regurgitation is present.  Small pericardial effusion is noted  IVC is dilated.  Estimated right atrial pressure is greater than 15 mmHg.    No prior study is available for comparison.           ASSESSMENT:  Heart failure with preserved ejection fraction  Small hemodynamically insignificant pericardial effusion  Moderate TR  Calcific mitral valve degeneration  Paroxysmal SVT    PLAN:  Patient's echocardiogram was discussed with patient's daughter.  She has more than 1 degenerative valvular disease, effecting the hemodynamics therefore it is challenging to optimize guideline directed medical therapy.      The risk of kidney injury was high with optimizing guideline directed medical therapy. Unfortunately, patient has developed NANETTE, which has now resolved.   Patient has been getting fluids over the past few days.  I recommend discontinuation of fluids. NANETTE was predominantly from overdiuresis.  Continue weight based diuresis only, if necessary to relieve shortness of breath.  Patient is now hypertensive which will lead to worsening of pulmonary edema, therefore will need higher doses of antihypertensives rather than gradually increase.    Start losartan 25 mg daily and continue amlodipine 5 mg p.o. daily.   Continue hydralazine pushes if blood pressure continues to stay above 180 on as needed basis    Patient had paroxysmal SVTs. Due to concern for significant bradycardia we will currently not initiate beta-blocker therapy.         Kevin hO MD  11/8/2024    12:09 EST

## 2024-11-08 NOTE — PLAN OF CARE
Problem: Adult Inpatient Plan of Care  Goal: Plan of Care Review  11/8/2024 0526 by Tea Tamez RN  Outcome: Progressing     Problem: Adult Inpatient Plan of Care  Goal: Plan of Care Review  11/8/2024 0526 by Tea Tamez RN  Outcome: Progressing  11/8/2024 0525 by Tea Tamez RN  Outcome: Progressing  Flowsheets (Taken 11/8/2024 0525)  Plan of Care Reviewed With: patient  11/8/2024 0525 by Tea Tamez RN  Outcome: Progressing  Flowsheets (Taken 11/8/2024 0525)  Plan of Care Reviewed With: patient  Goal: Patient-Specific Goal (Individualized)  Outcome: Progressing  Goal: Absence of Hospital-Acquired Illness or Injury  Outcome: Progressing  Intervention: Identify and Manage Fall Risk  Recent Flowsheet Documentation  Taken 11/8/2024 0514 by Tea Tamez RN  Safety Promotion/Fall Prevention:   room organization consistent   safety round/check completed  Taken 11/8/2024 0319 by Tea Tamez RN  Safety Promotion/Fall Prevention:   room organization consistent   safety round/check completed  Taken 11/8/2024 0141 by Tea Tamez RN  Safety Promotion/Fall Prevention:   room organization consistent   safety round/check completed  Taken 11/7/2024 2328 by Tae Tamez RN  Safety Promotion/Fall Prevention:   room organization consistent   safety round/check completed  Taken 11/7/2024 2226 by Tea Tamez RN  Safety Promotion/Fall Prevention: room organization consistent  Taken 11/7/2024 2152 by Tea Tamez RN  Safety Promotion/Fall Prevention:   room organization consistent   safety round/check completed  Taken 11/7/2024 1928 by Tea Tamez RN  Safety Promotion/Fall Prevention:   safety round/check completed   room organization consistent  Intervention: Prevent Skin Injury  Recent Flowsheet Documentation  Taken 11/7/2024 2226 by Tea Tamez RN  Body Position: position changed independently  Skin Protection: transparent dressing  maintained  Intervention: Prevent and Manage VTE (Venous Thromboembolism) Risk  Recent Flowsheet Documentation  Taken 11/7/2024 2226 by Tea Tamez RN  VTE Prevention/Management: (see MAR) other (see comments)  Intervention: Prevent Infection  Recent Flowsheet Documentation  Taken 11/8/2024 0514 by Tea Tamez RN  Infection Prevention:   rest/sleep promoted   single patient room provided  Taken 11/8/2024 0319 by Tea Tamez RN  Infection Prevention:   single patient room provided   rest/sleep promoted  Taken 11/8/2024 0141 by Tea Tamez RN  Infection Prevention:   rest/sleep promoted   single patient room provided  Taken 11/7/2024 2328 by Tea Tamez RN  Infection Prevention:   single patient room provided   rest/sleep promoted  Taken 11/7/2024 2152 by Tea Tamez RN  Infection Prevention:   rest/sleep promoted   single patient room provided  Taken 11/7/2024 1928 by Tea Tamez RN  Infection Prevention:   rest/sleep promoted   single patient room provided  Goal: Optimal Comfort and Wellbeing  Outcome: Progressing  Intervention: Monitor Pain and Promote Comfort  Recent Flowsheet Documentation  Taken 11/7/2024 2135 by Tea Tamez RN  Pain Management Interventions:   care clustered   quiet environment facilitated  Taken 11/7/2024 2105 by Tea Tamez RN  Pain Management Interventions: pain medication given  Intervention: Provide Person-Centered Care  Recent Flowsheet Documentation  Taken 11/7/2024 2226 by Tea Tamez RN  Trust Relationship/Rapport:   care explained   choices provided  Goal: Readiness for Transition of Care  Outcome: Progressing     Problem: Comorbidity Management  Goal: Maintenance of Heart Failure Symptom Control  Outcome: Progressing  Intervention: Maintain Heart Failure Management  Recent Flowsheet Documentation  Taken 11/8/2024 0514 by Tea Tamez RN  Medication Review/Management: medications reviewed  Taken 11/8/2024 0319  by Tea Tamez RN  Medication Review/Management: medications reviewed  Taken 11/8/2024 0141 by Tea Tamez RN  Medication Review/Management: medications reviewed  Taken 11/7/2024 2328 by Tea Tamez RN  Medication Review/Management: medications reviewed  Taken 11/7/2024 2226 by Tea Tamez RN  Medication Review/Management: medications reviewed  Taken 11/7/2024 2152 by Tea Tamez RN  Medication Review/Management: medications reviewed  Taken 11/7/2024 1928 by Tea Tamez RN  Medication Review/Management: medications reviewed  Goal: Blood Pressure in Desired Range  Outcome: Progressing  Intervention: Maintain Blood Pressure Management  Recent Flowsheet Documentation  Taken 11/8/2024 0514 by Tea Tamez RN  Medication Review/Management: medications reviewed  Taken 11/8/2024 0319 by Tea Tamez RN  Medication Review/Management: medications reviewed  Taken 11/8/2024 0141 by Tea Tamez RN  Medication Review/Management: medications reviewed  Taken 11/7/2024 2328 by Tea Tamez RN  Medication Review/Management: medications reviewed  Taken 11/7/2024 2226 by Tea Tamez RN  Medication Review/Management: medications reviewed  Taken 11/7/2024 2152 by Tea Tamez RN  Medication Review/Management: medications reviewed  Taken 11/7/2024 1928 by Tea Tamez RN  Medication Review/Management: medications reviewed     Problem: Heart Failure  Goal: Optimal Coping  Outcome: Progressing  Intervention: Support Psychosocial Response  Recent Flowsheet Documentation  Taken 11/7/2024 2226 by Tea Tamez RN  Supportive Measures: active listening utilized  Family/Support System Care: self-care encouraged  Goal: Optimal Cardiac Output and Blood Flow  Outcome: Progressing  Intervention: Optimize Cardiac Output  Recent Flowsheet Documentation  Taken 11/7/2024 2226 by Tea Tamez RN  Stabilization Measures: legs elevated  Environmental  Support:   calm environment promoted   caregiver consistency promoted  Taken 11/7/2024 1928 by Tea Tamez, RN  Stabilization Measures: legs elevated  Goal: Stable Heart Rate and Rhythm  Outcome: Progressing  Goal: Fluid and Electrolyte Balance  Outcome: Progressing  Intervention: Monitor and Manage Fluid and Electrolyte Balance  Recent Flowsheet Documentation  Taken 11/7/2024 2226 by Tea Tamez, RN  Fluid/Electrolyte Management: (2L/day)   fluids restricted   other (see comments)  Goal: Optimal Functional Ability  Outcome: Progressing  Intervention: Optimize Functional Ability  Recent Flowsheet Documentation  Taken 11/7/2024 2226 by Tea Tamez RN  Activity Management: activity encouraged  Self-Care Promotion:   independence encouraged   BADL personal objects within reach  Goal: Improved Oral Intake  Outcome: Progressing  Intervention: Promote and Optimize Nutrition Intake  Recent Flowsheet Documentation  Taken 11/7/2024 2226 by Tea Tamez RN  Oral Nutrition Promotion: rest periods promoted  Nutrition Interventions: food preferences provided  Goal: Effective Oxygenation and Ventilation  Outcome: Progressing  Intervention: Promote Airway Secretion Clearance  Recent Flowsheet Documentation  Taken 11/7/2024 2226 by Tea Tamez RN  Activity Management: activity encouraged  Cough And Deep Breathing: done independently per patient  Intervention: Optimize Oxygenation and Ventilation  Recent Flowsheet Documentation  Taken 11/7/2024 2226 by Tea Tamez RN  Head of Bed (HOB) Positioning: HOB lowered  Airway/Ventilation Management: airway patency maintained  Goal: Effective Breathing Pattern During Sleep  Outcome: Progressing  Intervention: Monitor and Manage Obstructive Sleep Apnea  Recent Flowsheet Documentation  Taken 11/8/2024 0514 by Tea Tamez, RN  Medication Review/Management: medications reviewed  Taken 11/8/2024 0319 by Tea Tamez, RN  Medication  Review/Management: medications reviewed  Taken 11/8/2024 0141 by Tea Tamez RN  Medication Review/Management: medications reviewed  Taken 11/7/2024 2328 by Tea Tamez RN  Medication Review/Management: medications reviewed  Taken 11/7/2024 2226 by Tea Tamez RN  Medication Review/Management: medications reviewed  Taken 11/7/2024 2152 by Tea Tamez RN  Medication Review/Management: medications reviewed  Taken 11/7/2024 1928 by Tea Tamez RN  Medication Review/Management: medications reviewed     Problem: Fall Injury Risk  Goal: Absence of Fall and Fall-Related Injury  Outcome: Progressing  Intervention: Identify and Manage Contributors  Recent Flowsheet Documentation  Taken 11/8/2024 0514 by Tea Tamez RN  Medication Review/Management: medications reviewed  Taken 11/8/2024 0319 by Tea Tamez RN  Medication Review/Management: medications reviewed  Taken 11/8/2024 0141 by Tea Tamez RN  Medication Review/Management: medications reviewed  Taken 11/7/2024 2328 by Tea Tamez RN  Medication Review/Management: medications reviewed  Taken 11/7/2024 2226 by Tea Tamez RN  Medication Review/Management: medications reviewed  Self-Care Promotion:   independence encouraged   BADL personal objects within reach  Taken 11/7/2024 2152 by Tea Tamez, RN  Medication Review/Management: medications reviewed  Taken 11/7/2024 1928 by Tea Tamez, RN  Medication Review/Management: medications reviewed  Intervention: Promote Injury-Free Environment  Recent Flowsheet Documentation  Taken 11/8/2024 0514 by Tea Tamez, RN  Safety Promotion/Fall Prevention:   room organization consistent   safety round/check completed  Taken 11/8/2024 0319 by Tea Tamez, RN  Safety Promotion/Fall Prevention:   room organization consistent   safety round/check completed  Taken 11/8/2024 0141 by Tea Tamez, RN  Safety Promotion/Fall Prevention:   room  organization consistent   safety round/check completed  Taken 11/7/2024 2328 by Tea Tamez RN  Safety Promotion/Fall Prevention:   room organization consistent   safety round/check completed  Taken 11/7/2024 2226 by Tea Tamez RN  Safety Promotion/Fall Prevention: room organization consistent  Taken 11/7/2024 2152 by Tea Tamez RN  Safety Promotion/Fall Prevention:   room organization consistent   safety round/check completed  Taken 11/7/2024 1928 by Tea Tamez RN  Safety Promotion/Fall Prevention:   safety round/check completed   room organization consistent     Problem: Skin Injury Risk Increased  Goal: Skin Health and Integrity  Outcome: Progressing  Intervention: Optimize Skin Protection  Recent Flowsheet Documentation  Taken 11/7/2024 2226 by Tea Tamez RN  Activity Management: activity encouraged  Pressure Reduction Techniques: frequent weight shift encouraged  Head of Bed (HOB) Positioning: HOB lowered  Pressure Reduction Devices: pressure-redistributing mattress utilized  Skin Protection: transparent dressing maintained  Intervention: Promote and Optimize Oral Intake  Recent Flowsheet Documentation  Taken 11/7/2024 2226 by Tea Tamez RN  Oral Nutrition Promotion: rest periods promoted  Nutrition Interventions: food preferences provided     Problem: Palliative Care  Goal: Enhanced Quality of Life  Outcome: Progressing  Intervention: Maximize Comfort  Recent Flowsheet Documentation  Taken 11/7/2024 2226 by Tea Tamez RN  Nutrition Interventions: food preferences provided  Oral Care: oral rinse provided  Taken 11/7/2024 2135 by Tea Tamez RN  Pain Management Interventions:   care clustered   quiet environment facilitated  Taken 11/7/2024 2105 by Tea Tamez RN  Pain Management Interventions: pain medication given  Intervention: Optimize Function  Recent Flowsheet Documentation  Taken 11/7/2024 2226 by Tea Tamez RN  Sensory  Stimulation Regulation:   lighting decreased   care clustered  Fatigue Management: activity assistance provided  Sleep/Rest Enhancement:   awakenings minimized   consistent schedule promoted  Intervention: Optimize Psychosocial Wellbeing  Recent Flowsheet Documentation  Taken 11/7/2024 2226 by Tea Tamez RN  Supportive Measures: active listening utilized  Family/Support System Care: self-care encouraged     Problem: Pain Acute  Goal: Optimal Pain Control and Function  Outcome: Progressing  Intervention: Optimize Psychosocial Wellbeing  Recent Flowsheet Documentation  Taken 11/7/2024 2226 by Tea Tamez RN  Supportive Measures: active listening utilized  Diversional Activities: television  Intervention: Develop Pain Management Plan  Recent Flowsheet Documentation  Taken 11/7/2024 2135 by Tea Tamez RN  Pain Management Interventions:   care clustered   quiet environment facilitated  Taken 11/7/2024 2105 by Tea Tamez RN  Pain Management Interventions: pain medication given  Intervention: Prevent or Manage Pain  Recent Flowsheet Documentation  Taken 11/8/2024 0514 by Tea Tamez RN  Medication Review/Management: medications reviewed  Taken 11/8/2024 0319 by Tea Tamez RN  Medication Review/Management: medications reviewed  Taken 11/8/2024 0141 by Tea Tamez RN  Medication Review/Management: medications reviewed  Taken 11/7/2024 2328 by Tea Tamez RN  Medication Review/Management: medications reviewed  Taken 11/7/2024 2226 by Tea Tamez RN  Sensory Stimulation Regulation:   lighting decreased   care clustered  Sleep/Rest Enhancement:   awakenings minimized   consistent schedule promoted  Medication Review/Management: medications reviewed  Taken 11/7/2024 2152 by Tea Tamez RN  Medication Review/Management: medications reviewed  Taken 11/7/2024 1928 by Tea Tamez RN  Medication Review/Management: medications reviewed   Goal Outcome  Evaluation:  Plan of Care Reviewed With: patient        Progress: no change  Outcome Evaluation: Patient A&Ox4, VSS, leg pain controlled with tramadol, sleeping in between care, tolerating abx therapy, on 3 LPM via NC, no s/s of distress, care ongoing

## 2024-11-08 NOTE — THERAPY TREATMENT NOTE
Acute Care - Physical Therapy Treatment Note  [facility]     Patient Name: Loly Terrazas  : 6/10/1929  MRN: [id number]  Today's Date: 2024   Onset of Illness/Injury or Date of Surgery: 24  Visit Dx:     ICD-10-CM ICD-9-CM   1. Acute congestive heart failure, unspecified heart failure type  I50.9 428.0   2. Difficulty in walking  R26.2 719.7   3. Acute heart failure, unspecified heart failure type  I50.9 428.9   4. 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    History of stroke    Hypothyroidism    Insomnia, unspecified    Memory change    Renal insufficiency    Scoliosis    Statin intolerance    Dizziness    Mixed hyperlipidemia    Heart failure     Past Medical History:   Diagnosis Date    Allergic rhinitis due to allergen 2018    Anemia 2019    Anxiety disorder 10/26/2020    Arthritis     COPD (chronic obstructive pulmonary disease) 2018    DDD (degenerative disc disease), lumbar 2019    Deafness     Dyspnea 2019    Essential hypertension 2018    Fall 2019    SEAN (generalized anxiety disorder) 2020    Gait disturbance 2018    History of stroke 2018    Hypothyroidism 2018    Insomnia 2019    Joint pain 10/26/2020    Low hemoglobin 2019    Lumbago 2019    Lumbar herniated disc     L5-S1    Memory change 2018    Renal insufficiency 10/26/2020    Scoliosis     Sinus node dysfunction     Statin intolerance 2019    Tremor     Vein disorder 2018     Past Surgical History:   Procedure Laterality Date    CHOLECYSTECTOMY      LEG SURGERY Right     Fracture with pins    THYROIDECTOMY        PT Assessment (Last 12 Hours)       PT Evaluation and Treatment       [facility] Name 11/08/24 1347          Physical Therapy Time and Intention    Document Type therapy note (daily note) (P)   -IF     Mode of Treatment individual therapy;physical therapy (P)   -IF     Patient Effort good (P)   -IF     Symptoms Noted During/After Treatment none (P)   -IF       [facility] Name 11/08/24 1347          General Information    Patient Profile Reviewed yes (P)   -IF     Patient Observations alert;agree to therapy;cooperative (P)   -IF       [facility] Name 11/08/24 1347          Bed Mobility    Bed Mobility supine-sit;sit-supine (P)   -IF     Supine-Sit [place] (Bed Mobility) standby assist (P)   -IF     Sit-Supine [place] (Bed Mobility) standby assist (P)   -IF     Assistive Device (Bed Mobility) head of bed elevated;bed rails (P)   -IF       [facility] Name 11/08/24 1347          Transfers    Transfers sit-stand transfer;stand-sit transfer (P)   -IF       Row Name 11/08/24 1347          Sit-Stand Transfer    Sit-Stand [place] (Transfers) standby assist (P)   -IF     Assistive Device (Sit-Stand Transfers) walker, front-wheeled (P)   -IF       [facility] Name 11/08/24 1347          Stand-Sit Transfer    Stand-Sit [place] (Transfers) standby assist (P)   -IF     Assistive Device (Stand-Sit Transfers) walker, front-wheeled (P)   -IF       [facility] Name 11/08/24 1347          Gait/Stairs (Locomotion)    Gait/Stairs Locomotion gait/ambulation assistive device (P)   -IF     [place] Level (Gait) contact guard (P)   -IF     Assistive Device (Gait) walker, front-wheeled (P)   -IF     Patient was able to Ambulate yes (P)   -IF     Distance in Feet (Gait) 20 (P)   -IF       [facility] Name 11/08/24 1347          Balance    Balance Assessment standing dynamic balance (P)   -IF     Dynamic Standing Balance contact guard (P)   -IF     Position/Device Used, Standing Balance supported;walker, front-wheeled (P)   -IF       [facility] Name 11/08/24 1347           Plan of Care Review    Plan of Care Reviewed With patient (P)   -IF     Progress improving (P)   -IF     Outcome Evaluation Pt was able to perform STS with SBA and ambulated 20' in the room with RW. Pt tolerated all interventions well but continues to need skilled therapy to address her deficits. (P)   -IF       Row Name 11/08/24 1347          Positioning and Restraints    Pre-Treatment Position in bed (P)   -IF     Post Treatment Position bed (P)   -IF     In Bed call light within reach;encouraged to call for assist;exit alarm on (P)   -IF               User Key  (r) = Recorded By, (t) = Taken By, (c) = Cosigned By      Initials Name Provider Type    IF Merna Kumar, PT Student PT Student                    Physical Therapy Education       Title: PT OT SLP Therapies (Resolved)       Topic: Physical Therapy (Resolved)       Point: Mobility training (Resolved)       Learning Progress Summary            Patient Acceptance, E,TB, VU by  at 11/8/2024 1328    Acceptance, E,TB, VU by EW at 11/4/2024 1145                      Point: Home exercise program (Resolved)       Learning Progress Summary            Patient Acceptance, E,TB, VU by  at 11/8/2024 1328    Acceptance, E,TB, VU by EW at 11/4/2024 1145                      Point: Body mechanics (Resolved)       Learning Progress Summary            Patient Acceptance, E,TB, VU by  at 11/8/2024 1328    Acceptance, E,TB, VU by EW at 11/4/2024 1145                      Point: Precautions (Resolved)       Learning Progress Summary            Patient Acceptance, E,TB, VU by  at 11/8/2024 1328    Acceptance, E,TB, VU by EW at 11/4/2024 1145                                      User Key       Initials Effective Dates Name Provider Type Discipline     02/02/24 -  Nicole Doyle, RN Registered Nurse Nurse    EW 08/27/24 -  Delfino Mcnulty, PT Student PT Student PT                  PT Recommendation and Plan     Progress Summary (PT)  Progress Toward Functional  Goals (PT): progress toward functional goals is good  Plan of Care Reviewed With: (P) patient  Progress: (P) improving  Outcome Evaluation: (P) Pt was able to perform STS with SBA and ambulated 20' in the room with RW. Pt tolerated all interventions well but continues to need skilled therapy to address her deficits.   Outcome Measures       Row Name 11/08/24 1350 11/07/24 1503          How much help from another person do you currently need...    Turning from your back to your side while in flat bed without using bedrails? 4 (P)   -IF 4  -JUANA (r) IF (t) JUANA (c)     Moving from lying on back to sitting on the side of a flat bed without bedrails? 4 (P)   -IF 4  -JUANA (r) IF (t) JUANA (c)     Moving to and from a bed to a chair (including a wheelchair)? 3 (P)   -IF 3  -JUANA (r) IF (t) JUANA (c)     Standing up from a chair using your arms (e.g., wheelchair, bedside chair)? 3 (P)   -IF 3  -JUANA (r) IF (t) JUANA (c)     Climbing 3-5 steps with a railing? 3 (P)   -IF 2  -JUANA (r) IF (t) JUANA (c)     To walk in hospital room? 3 (P)   -IF 3  -JUANA (r) IF (t) JUANA (c)     AM-PAC 6 Clicks Score (PT) 20 (P)   -IF 19  -JUANA (r) IF (t)               User Key  (r) = Recorded By, (t) = Taken By, (c) = Cosigned By      Initials Name Provider Type    Christian Mckeon, PT Physical Therapist    IF Merna Kumar, PT Student PT Student                     Time Calculation:    PT Charges       Row Name 11/08/24 1351             Time Calculation    PT Received On 11/08/24 (P)   -IF         Timed Charges    08921 - Gait Training Minutes  15 (P)   -IF         Total Minutes    Timed Charges Total Minutes 15 (P)   -IF       Total Minutes 15 (P)   -IF                User Key  (r) = Recorded By, (t) = Taken By, (c) = Cosigned By      Initials Name Provider Type    IF Troy, Merna, PT Student PT Student                  Therapy Charges for Today       Code Description Service Date Service Provider Modifiers Qty    74421800292 HC PT THER PROC EA 15 MIN  11/7/2024 Merna Kumar, PT Student GP 1            PT G-Codes  Outcome Measure Options: AM-PAC 6 Clicks Daily Activity (OT)  AM-PAC 6 Clicks Score (PT): (P) 20  AM-PAC 6 Clicks Score (OT): 21    Merna Kumar PT Student  11/8/2024

## 2024-11-08 NOTE — DISCHARGE SUMMARY
[facility]         HOSPITALIST  DISCHARGE SUMMARY    Patient Name: Loly Terrazas  : 6/10/1929  MRN: [id number]    Date of Admission: 2024  Date of Discharge: 2024  Primary Care Physician: Keisha Sullivan APRN    Consults       Date and Time Order Name Status Description    2024  7:48 AM Inpatient Nephrology Consult Completed     2024  7:08 AM Inpatient Cardiology Consult      2024  4:44 PM Inpatient Hospitalist Consult              Active and Resolved Hospital Problems:  Active Hospital Problems    Diagnosis POA    **Heart failure [I50.9] Yes      Resolved Hospital Problems   No resolved problems to display.   UTI due to E. coli  Acute exacerbation of diastolic congestive heart failure  Advanced age with debility  Hypertension  Hypotension  NANETTE  Chronic hypoxemia  Hypothyroidism  Moderate TR  Paroxysmal SVT    Hospital Course     Hospital Course:  Loly Terrazas is a 89 y.o. female with past medical history of COPD, hypertension, CVA, and hypothyroidism who presents with shortness of breath for a month  The patient's history was obtained from her and her daughters at bedside.  Patient's had some leg swelling and some shortness of breath for about 1 month.  However over the last 1 to 2 weeks has gotten progressively worse.  She has orthopnea.  She has PND.  Worsening lower extremity edema.  In the emergency department the patient's vital signs are as follows: Temperature  98.1, pulse 51, respiratory rate was 20, blood pressure 220/66, 85% on room air.  CBC shows no acute abnormalities.  CMP shows normal creatinine.  Sodium is 147.  BNP is 3000 with previous BNP of 419 and 2.  Chest x-ray shows cardiomegaly suggestive cardiomyopathy and/or pericardial effusion.  Likely small right pleural effusion with adjacent atelectasis and/or pneumonia.  Patient was given potassium.  She is given 40 of IV Lasix.  Patient's heart failure symptoms are most likely due to  diastolic dysfunction from severe hypertension.  She was given Nitropaste with improvement.  She was admitted for further care, cardiology was consulted.  She was diuresed, 2D echo showed normal EF and multiple valvular abnormalities.  Patient's echocardiogram was discussed with patient's daughter.  She has more than 1 degenerative valvular disease, effecting the hemodynamics therefore it is challenging to optimize guideline directed medical therapy.  Goal-directed medical therapy proved to be difficult as she had development of hypotension and NANETTE.  Nephrology was consulted.  Her renal function improved, she was started on low-dose ARB, Lasix, amlodipine for blood pressure control.  Palliative care was consulted.  Plan is for her to go to rehab but she may need a long-term care and they are open to discussing hospice down the road should she worsen.  PT/OT consulted, recommended rehab.  She was discharged in stable condition to [facility] on 11/8/2024.  She will continue supplemental oxygen at discharge.  Recommend repeat BMP within 3-5 days.  Recommend follow-up with cardiology within 2-3 weeks, nephrology within 1 week, PCP within 1 week.  CODE STATUS is DNR/DNI at the time of discharge.    DISCHARGE Follow Up Recommendations for labs and diagnostics: BMP within 3-5 days    Day of Discharge     Vital Signs:  Temp:  [97.5 °F (36.4 °C)-98.7 °F (37.1 °C)] 98.1 °F (36.7 °C)  Heart Rate:  [73-90] 73  Resp:  [16] 16  BP: (153-190)/(62-71) 153/67  Flow (L/min) (Oxygen Therapy):  [3] 3  Physical Exam:   General: Awake, alert, NAD, resting in bed  HENT: NCAT, MMM  Cardiovascular: RRR, systolic murmur appreciated  Pulmonary: Diminished in bilateral bases, otherwise clear, nasal cannula in place  Gastrointestinal: S/ND/NT, +BS  Musculoskeletal: No gross deformities  Skin: No jaundice, no rash on exposed skin appreciated  Neuro: CN II through XII grossly intact; speech clear; no tremor     Discharge Details         Discharge Medications        New Medications        Instructions Start Date   amLODIPine 5 MG tablet  Commonly known as: NORVASC   5 mg, Oral, Every 24 Hours Scheduled   Start Date: November 9, 2024     cefdinir 300 MG capsule  Commonly known as: OMNICEF   300 mg, Oral, 2 Times Daily      losartan 25 MG tablet  Commonly known as: COZAAR   25 mg, Oral, 2 Times Daily      traMADol 50 MG tablet  Commonly known as: ULTRAM   50 mg, Oral, Every 6 Hours PRN             Changes to Medications        Instructions Start Date   furosemide 20 MG tablet  Commonly known as: Lasix  What changed: additional instructions   20 mg, Oral, Daily             Continue These Medications        Instructions Start Date   levothyroxine 50 MCG tablet  Commonly known as: SYNTHROID, LEVOTHROID   50 mcg, Oral, Daily      meclizine 25 MG tablet  Commonly known as: ANTIVERT   25 mg, Oral, 3 Times Daily PRN      mirtazapine 7.5 MG tablet  Commonly known as: REMERON   7.5 mg, Oral, Nightly      vitamin B-12 1000 MCG tablet  Commonly known as: CYANOCOBALAMIN   1,000 mcg, Oral, Daily             Stop These Medications      atenolol 50 MG tablet  Commonly known as: TENORMIN              Allergies   Allergen Reactions    Pneumococcal Vac Polyvalent Swelling       Discharge Disposition:  Skilled Nursing Facility (DC - External)    Diet:  Hospital:  Diet Order   Procedures    Diet: Cardiac, Fluid Restriction (240 mL/tray); Low Sodium (2g); 2000 mL/day; Fluid Consistency: Thin (IDDSI 0)       Discharge Activity:   Activity Instructions       Activity as Tolerated              CODE STATUS:  Code Status and Medical Interventions: No CPR (Do Not Attempt to Resuscitate); Limited Support; No intubation (DNI)   Ordered at: 11/01/24 2044     Medical Intervention Limits:    No intubation (DNI)     Code Status (Patient has no pulse and is not breathing):    No CPR (Do Not Attempt to Resuscitate)     Medical Interventions (Patient has pulse or is breathing):     Limited Support         Future Appointments   Date Time Provider Department Center   3/5/2025 10:15 AM Keisha Sullivan APRN [facility]       Additional Instructions for the Follow-ups that You Need to Schedule       Discharge Follow-up with PCP   As directed       Currently Documented PCP:    Keisha Sullivan APRN    PCP Phone Number:    [phone]     Follow Up Details: 3-5 days                Pertinent  and/or Most Recent Results     PROCEDURES:   None    LAB RESULTS:      Lab 11/08/24 0419 11/07/24 0414 11/06/24 0407 11/05/24  0427 11/04/24  0407 11/02/24  0406 11/01/24  1540   WBC 5.03 6.19 6.75 11.48* 10.32 7.34 6.49   HEMOGLOBIN 11.3* 11.8* 12.6 13.1 13.4 11.7* 12.1   HEMATOCRIT 38.5 40.1 43.0 44.6 44.9 39.2 39.8   PLATELETS 166 164 162 185 176 142 146   NEUTROS ABS 2.87 4.04 4.05  --   --  4.99 4.78   IMMATURE GRANS (ABS) 0.01 0.02 0.01  --   --  0.02 0.02   LYMPHS ABS 1.26 1.26 1.50  --   --  1.50 1.06   MONOS ABS 0.68 0.65 0.88  --   --  0.74 0.57   EOS ABS 0.19 0.19 0.26  --   --  0.07 0.03   .4* 102.3* 100.5* 99.3* 98.2* 99.0* 99.3*         Lab 11/08/24 0419 11/07/24 0414 11/06/24  0407 11/05/24  0427 11/04/24  0407 11/03/24  1612 11/03/24  0413 11/02/24  0406 11/01/24  1540   SODIUM 140 138 137 139 141  --  144   < > 147*   POTASSIUM 4.2 4.8 4.8 5.1 4.1   < > 3.4*   < > 3.0*   CHLORIDE 100 98 94* 93* 96*  --  96*   < > 101   CO2 31.8* 35.3* 35.3* 38.3* 37.0*  --  42.3*   < > 37.0*   ANION GAP 8.2 4.7* 7.7 7.7 8.0  --  5.7   < > 9.0   BUN 21 39* 50* 37* 23  --  16   < > 12   CREATININE 0.86 1.28* 2.25* 1.95* 1.17*  --  0.95   < > 0.92   EGFR 62.3 38.7* 19.6* 23.3* 43.1*  --  55.3*   < > 57.5*   GLUCOSE 101* 112* 109* 117* 129*  --  105*   < > 147*   CALCIUM 9.1 9.3 9.3 9.8* 9.8*  --  9.5   < > 9.4   MAGNESIUM 1.7 1.9 2.0 1.9 1.7  --  1.7   < > 1.6*   PHOSPHORUS 2.3* 2.8 5.1*  --   --   --   --   --   --    HEMOGLOBIN A1C  --   --   --   --   --   --  5.50  --   --     TSH  --   --   --   --   --   --   --   --  2.060    < > = values in this interval not displayed.         Lab 11/02/24  0406 11/01/24  1540   TOTAL PROTEIN 6.3 6.9   ALBUMIN 3.8 4.2   GLOBULIN 2.5 2.7   ALT (SGPT) 12 14   AST (SGOT) 20 21   BILIRUBIN 1.5* 1.4*   ALK PHOS 71 76         Lab 11/01/24  1540   PROBNP 3,121.0*   HSTROP T 32*                 Brief Urine Lab Results  (Last result in the past 365 days)        Color   Clarity   Blood   Leuk Est   Nitrite   Protein   CREAT   Urine HCG        11/06/24 1139             112.2               Microbiology Results (last 10 days)       Procedure Component Value - Date/Time    Urine Culture - Urine, Urine, Clean Catch [[id number]]  (Abnormal) Collected: 11/06/24 1138    Lab Status: Preliminary result Specimen: Urine, Clean Catch Updated: 11/08/24 1116     Urine Culture >100,000 CFU/mL Escherichia coli    Narrative:      Colonization of the urinary tract without infection is common. Treatment is discouraged unless the patient is symptomatic, pregnant, or undergoing an invasive urologic procedure.    COVID-19, FLU A/B, RSV PCR 1 HR TAT - Swab, Nasopharynx [[id number]]  (Normal) Collected: 11/01/24 1552    Lab Status: Final result Specimen: Swab from Nasopharynx Updated: 11/01/24 1635     COVID19 Not Detected     Influenza A PCR Not Detected     Influenza B PCR Not Detected     RSV, PCR Not Detected    Narrative:      Fact sheet for providers: https://www.fda.gov/media/983598/download    Fact sheet for patients: https://www.fda.gov/media/548413/download    Test performed by PCR.            US Renal Bilateral    Result Date: 11/6/2024  Impression: Bilateral renal ultrasound demonstrating increased cortical echogenicity consistent with underlying glomerular disease. Electronically Signed: Aodnis Garcia MD  11/6/2024 10:01 PM EST  Workstation ID: [id number]    XR Chest 1 View    Result Date: 11/1/2024  Impression: Cardiomegaly suggestive of cardiomyopathy and/or pericardial  effusion. Likely small right pleural effusion with adjacent atelectasis and/or pneumonia. Electronically Signed: Kyle Padilla  11/1/2024 4:31 PM EDT  Workstation ID: [id number]              Results for orders placed during the hospital encounter of 11/01/24    Adult Transthoracic Echo Complete W/ Cont if Necessary Per Protocol    Interpretation Summary  Severe biatrial enlargement is present.  RV is dilated.  However LV has normal size.  LV has mild concentric hypertrophy.  No regional wall motion abnormalities are noted.  LV systolic function is hyperdynamic.  Estimated LVEF is greater than 70%.  Near obliteration of LV cavity is present.  Diastolic function cannot be accurately graded in the presence of LVH.  At least moderately abnormal diastolic function is present  LVOT Doppler jet is symmetric.  Gradient is not concerning for LVOT obstruction..  Aortic valve is trileaflet.  Leaflets are calcified.  Mild aortic stenosis is noted.  Mild to moderate aortic regurgitation is present  Mitral valve has thickened leaflets with preserved excursion.  MAC is present.  Mild to moderate MR is present  Tricuspid valve is not well-visualized.  Moderate to severe TR is noted.  Calculated RVSP is 40 mmHg plus right atrial pressure.  Hepatic vein/IVC Doppler is not included in the study.  Mild to moderate pulmonary regurgitation is present.  Small pericardial effusion is noted  IVC is dilated.  Estimated right atrial pressure is greater than 15 mmHg.    No prior study is available for comparison.      Labs Pending at Discharge:  Pending Labs       Order Current Status    Urine Culture - Urine, Urine, Clean Catch Preliminary result              Time spent on Discharge including face to face service:  34 minutes    Electronically signed by Albert Zamora MD, 11/08/24, 12:42 PM EST.

## 2024-11-08 NOTE — PROGRESS NOTES
" LOS: 7 days   Patient Care Team:  Keisha Sullivan APRN as PCP - General (Nurse Practitioner)    Chief Complaint: NANETTE    Subjective     History of Present Illness  Pt feels more short of breath than before.  Blood pressure is on the high side.  Otherwise she feels better.    Subjective:  Symptoms:  Improved.  She reports shortness of breath.    Diet:  Adequate intake.  No nausea or vomiting.    Activity level: Impaired due to weakness.    Pain:  She reports no pain.        History taken from: patient chart RN    Objective     Vital Sign Min/Max for last 24 hours  Temp  Min: 97.5 °F (36.4 °C)  Max: 98.7 °F (37.1 °C)   BP  Min: 138/63  Max: 190/66   Pulse  Min: 73  Max: 90   Resp  Min: 16  Max: 16   SpO2  Min: 91 %  Max: 96 %   Flow (L/min) (Oxygen Therapy)  Min: 3  Max: 3   Weight  Min: 66.4 kg (146 lb 6.2 oz)  Max: 66.4 kg (146 lb 6.2 oz)     Flowsheet Rows      Flowsheet Row First Filed Value   Admission Height 157.5 cm (62\") Documented at 11/01/2024 1518   Admission Weight 71.7 kg (158 lb 1.1 oz) Documented at 11/01/2024 1518            No intake/output data recorded.  I/O last 3 completed shifts:  In: 1160 [P.O.:1140; IV Piggyback:20]  Out: 1200 [Urine:1200]    Objective:  General Appearance:  Comfortable, in no acute distress and not in pain.    Vital signs: (most recent): Blood pressure 153/67, pulse 73, temperature 98.1 °F (36.7 °C), temperature source Oral, resp. rate 16, height 157.5 cm (62\"), weight 66.4 kg (146 lb 6.2 oz), SpO2 95%.  Vital signs are normal.  No fever.    Output: Producing urine.    HEENT: Normal HEENT exam.  (+ JVD)    Lungs:  Normal effort and normal respiratory rate.  She is not in respiratory distress.  There are rales.    Heart: Normal rate.  Regular rhythm.    Abdomen: Abdomen is soft.  Bowel sounds are normal.   There is no abdominal tenderness.   (Pure wick in place.).   Extremities: Normal range of motion.  There is no dependent edema.    Pulses: Distal pulses are intact.  " "  Neurological: Patient is alert and oriented to person, place and time.    Skin:  Warm and dry.                Results Review:     I reviewed the patient's new clinical results.  I reviewed the patient's new imaging results and agree with the interpretation.  I reviewed the patient's other test results and agree with the interpretation    WBC WBC   Date Value Ref Range Status   11/08/2024 5.03 3.40 - 10.80 10*3/mm3 Final   11/07/2024 6.19 3.40 - 10.80 10*3/mm3 Final   11/06/2024 6.75 3.40 - 10.80 10*3/mm3 Final      HGB Hemoglobin   Date Value Ref Range Status   11/08/2024 11.3 (L) 12.0 - 15.9 g/dL Final   11/07/2024 11.8 (L) 12.0 - 15.9 g/dL Final   11/06/2024 12.6 12.0 - 15.9 g/dL Final      HCT Hematocrit   Date Value Ref Range Status   11/08/2024 38.5 34.0 - 46.6 % Final   11/07/2024 40.1 34.0 - 46.6 % Final   11/06/2024 43.0 34.0 - 46.6 % Final      Platlets No results found for: \"LABPLAT\"   MCV MCV   Date Value Ref Range Status   11/08/2024 102.4 (H) 79.0 - 97.0 fL Final   11/07/2024 102.3 (H) 79.0 - 97.0 fL Final   11/06/2024 100.5 (H) 79.0 - 97.0 fL Final          Sodium Sodium   Date Value Ref Range Status   11/08/2024 140 136 - 145 mmol/L Final   11/07/2024 138 136 - 145 mmol/L Final   11/06/2024 137 136 - 145 mmol/L Final      Potassium Potassium   Date Value Ref Range Status   11/08/2024 4.2 3.5 - 5.2 mmol/L Final   11/07/2024 4.8 3.5 - 5.2 mmol/L Final   11/06/2024 4.8 3.5 - 5.2 mmol/L Final      Chloride Chloride   Date Value Ref Range Status   11/08/2024 100 98 - 107 mmol/L Final   11/07/2024 98 98 - 107 mmol/L Final   11/06/2024 94 (L) 98 - 107 mmol/L Final      CO2 CO2   Date Value Ref Range Status   11/08/2024 31.8 (H) 22.0 - 29.0 mmol/L Final   11/07/2024 35.3 (H) 22.0 - 29.0 mmol/L Final   11/06/2024 35.3 (H) 22.0 - 29.0 mmol/L Final      BUN BUN   Date Value Ref Range Status   11/08/2024 21 8 - 23 mg/dL Final   11/07/2024 39 (H) 8 - 23 mg/dL Final   11/06/2024 50 (H) 8 - 23 mg/dL Final    " "  Creatinine Creatinine   Date Value Ref Range Status   11/08/2024 0.86 0.57 - 1.00 mg/dL Final   11/07/2024 1.28 (H) 0.57 - 1.00 mg/dL Final   11/06/2024 2.25 (H) 0.57 - 1.00 mg/dL Final      Calcium Calcium   Date Value Ref Range Status   11/08/2024 9.1 8.2 - 9.6 mg/dL Final   11/07/2024 9.3 8.2 - 9.6 mg/dL Final   11/06/2024 9.3 8.2 - 9.6 mg/dL Final      PO4 No results found for: \"CAPO4\"   Albumin No results found for: \"ALBUMIN\"   Magnesium Magnesium   Date Value Ref Range Status   11/08/2024 1.7 1.7 - 2.3 mg/dL Final   11/07/2024 1.9 1.7 - 2.3 mg/dL Final   11/06/2024 2.0 1.7 - 2.3 mg/dL Final      Uric Acid No results found for: \"URICACID\"     Medication Review:   cefTRIAXone, 1,000 mg, Intravenous, Q24H  enoxaparin, 30 mg, Subcutaneous, Daily  [START ON 11/9/2024] furosemide, 20 mg, Oral, Daily  furosemide, 40 mg, Oral, Once  levothyroxine, 50 mcg, Oral, Q AM  Lidocaine, 2 patch, Transdermal, Q24H  losartan, 12.5 mg, Oral, BID  sodium chloride, 10 mL, Intravenous, Q12H  [Held by provider] spironolactone, 25 mg, Oral, Daily          Assessment & Plan       Heart failure      Assessment & Plan  - Acute kidney injury, oliguric most likely secondary to hypotension and intravascular volume depletion.  Hypotension resolved now.  Status post IV fluid.  Today she is more short of breath, creatinine is back to normal.  Will give Lasix 40 mg p.o. x 1 and start 20 mg daily starting tomorrow.    Will start losartan 12.5 mg p.o. twice daily and monitor.  Continue to hold Aldactone for now but can reintroduce at a later date.    UA is bland with mild proteinuria, UPC 34/112.  Renal ultrasound compatible with CKD.     - Probable underlying chronic kidney disease stage III with advanced age.    - Diastolic dysfunction/congestive heart failure and paroxysmal SVT, per cardiology.  I wonder if cardiology feels differently about priorities of ARB/spironolactone    - Hypertension with hypotension, restartingn ARB as above.  Will " resume amlodipine at 2.5 mg daily and monitor.      If discharged, follow-up with me in the office in 1 week  Please call with any questions.    Manoj Jain MD  11/08/24  08:58 EST

## 2024-11-08 NOTE — THERAPY TREATMENT NOTE
Patient Name: Loly Terrazas  : 6/10/1929    MRN: [id number]                              Today's Date: 2024       Admit Date: 2024    Visit Dx:     ICD-10-CM ICD-9-CM   1. Acute congestive heart failure, unspecified heart failure type  I50.9 428.0   2. Difficulty in walking  R26.2 719.7   3. Acute heart failure, unspecified heart failure type  I50.9 428.9   4. 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    History of stroke    Hypothyroidism    Insomnia, unspecified    Memory change    Renal insufficiency    Scoliosis    Statin intolerance    Dizziness    Mixed hyperlipidemia    Heart failure     Past Medical History:   Diagnosis Date    Allergic rhinitis due to allergen 2018    Anemia 2019    Anxiety disorder 10/26/2020    Arthritis     COPD (chronic obstructive pulmonary disease) 2018    DDD (degenerative disc disease), lumbar 2019    Deafness     Dyspnea 2019    Essential hypertension 2018    Fall 2019    SEAN (generalized anxiety disorder) 2020    Gait disturbance 2018    History of stroke 2018    Hypothyroidism 2018    Insomnia 2019    Joint pain 10/26/2020    Low hemoglobin 2019    Lumbago 2019    Lumbar herniated disc     L5-S1    Memory change 2018    Renal insufficiency 10/26/2020    Scoliosis     Sinus node dysfunction     Statin intolerance 2019    Tremor     Vein disorder 2018     Past Surgical History:   Procedure Laterality Date    CHOLECYSTECTOMY      LEG SURGERY Right     Fracture with pins    THYROIDECTOMY        General Information       Row Name 24 5045           OT Time and Intention    Subjective Information no complaints (P)   -     Document Type therapy note (daily note) (P)   -     Mode of Treatment individual therapy;occupational therapy (P)   -     Patient Effort good (P)   -       Row Name 11/08/24 1303          General Information    Patient Profile Reviewed yes (P)   -     Existing Precautions/Restrictions fall (P)   -       Row Name 11/08/24 1303          Cognition    Orientation Status (Cognition) oriented x 3 (P)   -       Row Name 11/08/24 1303          Safety Issues/Impairments Affecting Functional Mobility    Impairments Affecting Function (Mobility) balance;endurance/activity tolerance;shortness of breath;strength (P)   -               User Key  (r) = Recorded By, (t) = Taken By, (c) = Cosigned By      Initials Name Provider Type    Dahlia Lee, OT Student OT Student                     Mobility/ADL's       Row Name 11/08/24 1303          Bed Mobility    Bed Mobility supine-sit;sit-supine (P)   -     Supine-Sit [place] (Bed Mobility) standby assist (P)   -     Sit-Supine [place] (Bed Mobility) standby assist (P)   -     Assistive Device (Bed Mobility) bed rails (P)   -       Row Name 11/08/24 1303          Transfers    Transfers sit-stand transfer;stand-sit transfer (P)   -     Comment, (Transfers) Patient completed x7 transfers throughout session, requiring CGA for support. Patient required verbal cues for UE placement in bed for safe transfer. (P)   -       Row Name 11/08/24 1303          Sit-Stand Transfer    Sit-Stand [place] (Transfers) contact guard (P)   -     Assistive Device (Sit-Stand Transfers) walker, front-wheeled (P)   -       Row Name 11/08/24 1303          Stand-Sit Transfer    Stand-Sit [place] (Transfers) contact guard (P)   -     Assistive Device (Stand-Sit Transfers) walker, front-wheeled (P)   -               User Key  (r) = Recorded By, (t) = Taken By, (c) =  Cosigned By      Initials Name Provider Type    Dahlia Lee OT Student OT Student                   Obj/Interventions       Row Name 11/08/24 1306          Shoulder (Therapeutic Exercise)    Shoulder (Therapeutic Exercise) strengthening exercise (P)   -     Shoulder Strengthening (Therapeutic Exercise) bilateral;flexion;extension;aBduction;aDduction;resistance band;yellow;10 repetitions;sitting (P)   targeting activity tolerance  -       Row Name 11/08/24 1306          Elbow/Forearm (Therapeutic Exercise)    Elbow/Forearm (Therapeutic Exercise) strengthening exercise (P)   -     Elbow/Forearm Strengthening (Therapeutic Exercise) bilateral;flexion;extension;resistance band;yellow;10 repetitions;sitting (P)   -       Row Name 11/08/24 1306          Motor Skills    Motor Skills functional endurance (P)   -     Functional Endurance Fair-, patient tolerated multiple transfers and required to sit for exercises (P)   -     Therapeutic Exercise shoulder;elbow/forearm (P)   -       Row Name 11/08/24 1306          Balance    Balance Assessment sitting dynamic balance;standing static balance (P)   -     Dynamic Sitting Balance supervision (P)   -     Position, Sitting Balance sitting edge of bed;unsupported (P)   -     Static Standing Balance contact guard (P)   -     Position/Device Used, Standing Balance supported;walker, front-wheeled (P)   -     Balance Interventions sitting;dynamic;UE activity with balance activity (P)   -     Comment, Balance Patient demonstrated no losses of balance. (P)   -               User Key  (r) = Recorded By, (t) = Taken By, (c) = Cosigned By      Initials Name Provider Type    Dahlia Lee OT Student OT Student                   Goals/Plan    No documentation.                  Clinical Impression       Row Name 11/08/24 1309          Pain Assessment    Pretreatment Pain Rating 0/10 - no pain (P)   -     Posttreatment Pain Rating  0/10 - no pain (P)   -AJ       Row Name 11/08/24 1309          Plan of Care Review    Plan of Care Reviewed With patient (P)   -STEFAN     Progress improving (P)   -AJ     Outcome Evaluation Patient AOx3 and showed fair- tolerance throughtout session. Patient participated in functional task targeting activity tolerance for increasing independence with ADLs. Patient showed improvement with activity tolerance, requiring no rest breaks in between tasks. Patient continues to demonstrate decreased balance when fatigued, often falling back onto bed when sitting down, inhitibing her independence with mobility/transfers. Patient would benefit from skilled OT intervention addressing deficits to maximize independence in ADLs. (P)   -AJ       Row Name 11/08/24 1309          Vital Signs    O2 Delivery Pre Treatment nasal cannula (P)   -STEFAN     O2 Delivery Intra Treatment nasal cannula (P)   -AJ     O2 Delivery Post Treatment nasal cannula (P)   -STEFAN       Row Name 11/08/24 1309          Positioning and Restraints    Pre-Treatment Position in bed (P)   -AJ     Post Treatment Position bed (P)   -AJ     In Bed fowlers;call light within reach;encouraged to call for assist;exit alarm on;with family/caregiver (P)   -AJ               User Key  (r) = Recorded By, (t) = Taken By, (c) = Cosigned By      Initials Name Provider Type    Dahlia Lee, OT Student OT Student                   Outcome Measures       Row Name 11/08/24 1314          How much help from another is currently needed...    Putting on and taking off regular lower body clothing? 3 (P)   -AJ     Bathing (including washing, rinsing, and drying) 3 (P)   -AJ     Toileting (which includes using toilet bed pan or urinal) 3 (P)   -AJ     Putting on and taking off regular upper body clothing 4 (P)   -AJ     Taking care of personal grooming (such as brushing teeth) 4 (P)   -AJ     Eating meals 4 (P)   -STEFAN     AM-PAC 6 Clicks Score (OT) 21 (P)   -STEFAN       Row Name  11/08/24 0738          How much help from another person do you currently need...    Turning from your back to your side while in flat bed without using bedrails? 4  -KD     Moving from lying on back to sitting on the side of a flat bed without bedrails? 3  -KD     Moving to and from a bed to a chair (including a wheelchair)? 2  -KD     Standing up from a chair using your arms (e.g., wheelchair, bedside chair)? 2  -KD     Climbing 3-5 steps with a railing? 2  -KD     To walk in hospital room? 2  -KD     AM-PAC 6 Clicks Score (PT) 15  -KD     Highest Level of Mobility Goal 4 --> Transfer to chair/commode  -KD       Row Name 11/08/24 1314          Functional Assessment    Outcome Measure Options AM-PAC 6 Clicks Daily Activity (OT) (P)   -STEFAN               User Key  (r) = Recorded By, (t) = Taken By, (c) = Cosigned By      Initials Name Provider Type    Nicole Rebolledo, RN Registered Nurse    Dahlia Lee, OT Student OT Student                    Occupational Therapy Education       Title: PT OT SLP Therapies (Resolved)       Topic: Occupational Therapy (Resolved)       Point: ADL training (Resolved)       Description:   Instruct learner(s) on proper safety adaptation and remediation techniques during self care or transfers.   Instruct in proper use of assistive devices.                  Learning Progress Summary            Patient Acceptance, E,TB, VU by  at 11/8/2024 1328    Acceptance, E,D, DU by PG at 11/4/2024 0857                      Point: Home exercise program (Resolved)       Description:   Instruct learner(s) on appropriate technique for monitoring, assisting and/or progressing therapeutic exercises/activities.                  Learning Progress Summary            Patient Acceptance, E,TB, VU by  at 11/8/2024 1328    Acceptance, E,D, DU by PG at 11/4/2024 0857                      Point: Precautions (Resolved)       Description:   Instruct learner(s) on prescribed precautions during  self-care and functional transfers.                  Learning Progress Summary            Patient Acceptance, E,TB, VU by  at 11/8/2024 1328    Acceptance, E,D, DU by PG at 11/4/2024 0857                      Point: Body mechanics (Resolved)       Description:   Instruct learner(s) on proper positioning and spine alignment during self-care, functional mobility activities and/or exercises.                  Learning Progress Summary            Patient Acceptance, E,TB, VU by  at 11/8/2024 1328    Acceptance, E,D, DU by PG at 11/4/2024 0857                                      User Key       Initials Effective Dates Name Provider Type Discipline     02/02/24 -  Nicole Doyle, RN Registered Nurse Nurse    PG 06/16/21 -  Alex Boone OT Occupational Therapist OT                  OT Recommendation and Plan     Plan of Care Review  Plan of Care Reviewed With: (P) patient  Progress: (P) improving  Outcome Evaluation: (P) Patient AOx3 and showed fair- tolerance throughtout session. Patient participated in functional task targeting activity tolerance for increasing independence with ADLs. Patient showed improvement with activity tolerance, requiring no rest breaks in between tasks. Patient continues to demonstrate decreased balance when fatigued, often falling back onto bed when sitting down, inhitibing her independence with mobility/transfers. Patient would benefit from skilled OT intervention addressing deficits to maximize independence in ADLs.     Time Calculation:         Time Calculation- OT       Row Name 11/08/24 1314             Time Calculation- OT    OT Received On 11/08/24 (P)   -      OT Goal Re-Cert Due Date 11/13/24 (P)   -AJ         Timed Charges    63092 - OT Therapeutic Exercise Minutes 16 (P)   -AJ         Total Minutes    Timed Charges Total Minutes 16 (P)   -AJ       Total Minutes 16 (P)   -AJ                User Key  (r) = Recorded By, (t) = Taken By, (c) = Cosigned By      Initials Name  Provider Type    AJ Dahlia Trevino OT Student OT Student                  Therapy Charges for Today       Code Description Service Date Service Provider Modifiers Qty    40532625948 HC OT THER PROC EA 15 MIN 11/8/2024 Dahlia Trevino OT Student GO 1                 INDIANA Arvizu  11/8/2024

## 2024-11-08 NOTE — PLAN OF CARE
Goal Outcome Evaluation:              Outcome Evaluation: patient discharging to signature of bernardino

## 2024-11-09 LAB — BACTERIA SPEC AEROBE CULT: ABNORMAL

## 2024-11-11 ENCOUNTER — TELEPHONE (OUTPATIENT)
Dept: FAMILY MEDICINE CLINIC | Facility: CLINIC | Age: 89
End: 2024-11-11

## 2024-11-11 NOTE — TELEPHONE ENCOUNTER
SVEN- spoke with Linnea and she states they will probably go full care for pt and not sure if it will be there or not. Probably be long term.

## 2024-11-11 NOTE — TELEPHONE ENCOUNTER
Caller: LINDY LOPEZ    Relationship to patient: Emergency Contact    Best call back number: [phone] (Mobile)     Patient is needing: CALLER WANTED TO HAVE A CALL BACK AND LET PCP KNOW THAT PATIENT WAS ADMITTED IN HOSPITAL LAST WEEK FOR 7 DAYS AND NOW IS IN [facility] IN [place]

## 2024-11-25 ENCOUNTER — APPOINTMENT (OUTPATIENT)
Dept: GENERAL RADIOLOGY | Facility: HOSPITAL | Age: 89
End: 2024-11-25
Payer: MEDICARE

## 2024-11-25 ENCOUNTER — HOSPITAL ENCOUNTER (EMERGENCY)
Facility: HOSPITAL | Age: 89
Discharge: NURSING, REHAB OR LONG-TERM CARE | End: 2024-11-25
Attending: EMERGENCY MEDICINE | Admitting: EMERGENCY MEDICINE
Payer: MEDICARE

## 2024-11-25 VITALS
SYSTOLIC BLOOD PRESSURE: 110 MMHG | OXYGEN SATURATION: 92 % | DIASTOLIC BLOOD PRESSURE: 62 MMHG | HEART RATE: 75 BPM | RESPIRATION RATE: 16 BRPM | HEIGHT: 62 IN | WEIGHT: 145.5 LBS | TEMPERATURE: 97.2 F | BODY MASS INDEX: 26.78 KG/M2

## 2024-11-25 DIAGNOSIS — I50.9 ACUTE ON CHRONIC CONGESTIVE HEART FAILURE, UNSPECIFIED HEART FAILURE TYPE: Primary | ICD-10-CM

## 2024-11-25 LAB
ALBUMIN SERPL-MCNC: 3.5 G/DL (ref 3.5–5.2)
ALBUMIN/GLOB SERPL: 1.2 G/DL
ALP SERPL-CCNC: 86 U/L (ref 39–117)
ALT SERPL W P-5'-P-CCNC: 30 U/L (ref 1–33)
ANION GAP SERPL CALCULATED.3IONS-SCNC: 10.6 MMOL/L (ref 5–15)
AST SERPL-CCNC: 38 U/L (ref 1–32)
BACTERIA UR QL AUTO: ABNORMAL /HPF
BASOPHILS # BLD AUTO: 0.03 10*3/MM3 (ref 0–0.2)
BASOPHILS NFR BLD AUTO: 0.3 % (ref 0–1.5)
BILIRUB SERPL-MCNC: 0.5 MG/DL (ref 0–1.2)
BILIRUB UR QL STRIP: NEGATIVE
BUN SERPL-MCNC: 17 MG/DL (ref 8–23)
BUN/CREAT SERPL: 11.8 (ref 7–25)
CALCIUM SPEC-SCNC: 9 MG/DL (ref 8.2–9.6)
CHLORIDE SERPL-SCNC: 102 MMOL/L (ref 98–107)
CLARITY UR: CLEAR
CO2 SERPL-SCNC: 31.4 MMOL/L (ref 22–29)
COLOR UR: ABNORMAL
CREAT SERPL-MCNC: 1.44 MG/DL (ref 0.57–1)
DEPRECATED RDW RBC AUTO: 51.1 FL (ref 37–54)
EGFRCR SERPLBLD CKD-EPI 2021: 33.6 ML/MIN/1.73
EOSINOPHIL # BLD AUTO: 0.1 10*3/MM3 (ref 0–0.4)
EOSINOPHIL NFR BLD AUTO: 1 % (ref 0.3–6.2)
ERYTHROCYTE [DISTWIDTH] IN BLOOD BY AUTOMATED COUNT: 14 % (ref 12.3–15.4)
FLUAV SUBTYP SPEC NAA+PROBE: NOT DETECTED
FLUBV RNA ISLT QL NAA+PROBE: NOT DETECTED
GLOBULIN UR ELPH-MCNC: 3 GM/DL
GLUCOSE SERPL-MCNC: 133 MG/DL (ref 65–99)
GLUCOSE UR STRIP-MCNC: NEGATIVE MG/DL
HCT VFR BLD AUTO: 43.2 % (ref 34–46.6)
HGB BLD-MCNC: 12.8 G/DL (ref 12–15.9)
HGB UR QL STRIP.AUTO: NEGATIVE
HOLD SPECIMEN: NORMAL
HOLD SPECIMEN: NORMAL
HYALINE CASTS UR QL AUTO: ABNORMAL /LPF
IMM GRANULOCYTES # BLD AUTO: 0.02 10*3/MM3 (ref 0–0.05)
IMM GRANULOCYTES NFR BLD AUTO: 0.2 % (ref 0–0.5)
KETONES UR QL STRIP: ABNORMAL
LEUKOCYTE ESTERASE UR QL STRIP.AUTO: ABNORMAL
LYMPHOCYTES # BLD AUTO: 2.52 10*3/MM3 (ref 0.7–3.1)
LYMPHOCYTES NFR BLD AUTO: 24.3 % (ref 19.6–45.3)
MCH RBC QN AUTO: 29.8 PG (ref 26.6–33)
MCHC RBC AUTO-ENTMCNC: 29.6 G/DL (ref 31.5–35.7)
MCV RBC AUTO: 100.5 FL (ref 79–97)
MONOCYTES # BLD AUTO: 0.83 10*3/MM3 (ref 0.1–0.9)
MONOCYTES NFR BLD AUTO: 8 % (ref 5–12)
NEUTROPHILS NFR BLD AUTO: 6.87 10*3/MM3 (ref 1.7–7)
NEUTROPHILS NFR BLD AUTO: 66.2 % (ref 42.7–76)
NITRITE UR QL STRIP: NEGATIVE
NRBC BLD AUTO-RTO: 0 /100 WBC (ref 0–0.2)
NT-PROBNP SERPL-MCNC: 6664 PG/ML (ref 0–1800)
PH UR STRIP.AUTO: <=5 [PH] (ref 5–8)
PLATELET # BLD AUTO: 162 10*3/MM3 (ref 140–450)
PMV BLD AUTO: 10.9 FL (ref 6–12)
POTASSIUM SERPL-SCNC: 3.4 MMOL/L (ref 3.5–5.2)
PROT SERPL-MCNC: 6.5 G/DL (ref 6–8.5)
PROT UR QL STRIP: ABNORMAL
RBC # BLD AUTO: 4.3 10*6/MM3 (ref 3.77–5.28)
RBC # UR STRIP: ABNORMAL /HPF
REF LAB TEST METHOD: ABNORMAL
RSV RNA NPH QL NAA+NON-PROBE: NOT DETECTED
SARS-COV-2 RNA RESP QL NAA+PROBE: NOT DETECTED
SODIUM SERPL-SCNC: 144 MMOL/L (ref 136–145)
SP GR UR STRIP: 1.01 (ref 1–1.03)
SQUAMOUS #/AREA URNS HPF: ABNORMAL /HPF
TROPONIN T SERPL HS-MCNC: 49 NG/L
UROBILINOGEN UR QL STRIP: ABNORMAL
WBC # UR STRIP: ABNORMAL /HPF
WBC NRBC COR # BLD AUTO: 10.37 10*3/MM3 (ref 3.4–10.8)
WHOLE BLOOD HOLD COAG: NORMAL
WHOLE BLOOD HOLD SPECIMEN: NORMAL

## 2024-11-25 PROCEDURE — 36415 COLL VENOUS BLD VENIPUNCTURE: CPT

## 2024-11-25 PROCEDURE — 96374 THER/PROPH/DIAG INJ IV PUSH: CPT

## 2024-11-25 PROCEDURE — 25010000002 FUROSEMIDE PER 20 MG: Performed by: EMERGENCY MEDICINE

## 2024-11-25 PROCEDURE — 71045 X-RAY EXAM CHEST 1 VIEW: CPT

## 2024-11-25 PROCEDURE — 80053 COMPREHEN METABOLIC PANEL: CPT | Performed by: EMERGENCY MEDICINE

## 2024-11-25 PROCEDURE — 84484 ASSAY OF TROPONIN QUANT: CPT | Performed by: EMERGENCY MEDICINE

## 2024-11-25 PROCEDURE — 99284 EMERGENCY DEPT VISIT MOD MDM: CPT

## 2024-11-25 PROCEDURE — 93005 ELECTROCARDIOGRAM TRACING: CPT | Performed by: EMERGENCY MEDICINE

## 2024-11-25 PROCEDURE — 99285 EMERGENCY DEPT VISIT HI MDM: CPT

## 2024-11-25 PROCEDURE — 81001 URINALYSIS AUTO W/SCOPE: CPT | Performed by: EMERGENCY MEDICINE

## 2024-11-25 PROCEDURE — 83880 ASSAY OF NATRIURETIC PEPTIDE: CPT | Performed by: EMERGENCY MEDICINE

## 2024-11-25 PROCEDURE — 87637 SARSCOV2&INF A&B&RSV AMP PRB: CPT | Performed by: EMERGENCY MEDICINE

## 2024-11-25 PROCEDURE — 85025 COMPLETE CBC W/AUTO DIFF WBC: CPT | Performed by: EMERGENCY MEDICINE

## 2024-11-25 RX ORDER — FUROSEMIDE 10 MG/ML
40 INJECTION INTRAMUSCULAR; INTRAVENOUS ONCE
Status: COMPLETED | OUTPATIENT
Start: 2024-11-25 | End: 2024-11-25

## 2024-11-25 RX ORDER — SODIUM CHLORIDE 0.9 % (FLUSH) 0.9 %
10 SYRINGE (ML) INJECTION AS NEEDED
Status: DISCONTINUED | OUTPATIENT
Start: 2024-11-25 | End: 2024-11-25 | Stop reason: HOSPADM

## 2024-11-25 RX ORDER — MIDODRINE HYDROCHLORIDE 5 MG/1
5 TABLET ORAL ONCE
Status: COMPLETED | OUTPATIENT
Start: 2024-11-25 | End: 2024-11-25

## 2024-11-25 RX ADMIN — FUROSEMIDE 40 MG: 10 INJECTION, SOLUTION INTRAMUSCULAR; INTRAVENOUS at 08:13

## 2024-11-25 RX ADMIN — MIDODRINE HYDROCHLORIDE 5 MG: 5 TABLET ORAL at 11:26

## 2024-11-25 NOTE — CONSULTS
"Patient is familiar to palliative care from previous admission. Met with patient and patients daughter at bedside. Patient is alert and oriented to person, place, time and situation during visit. Introduced self and explained role and purpose of visit. Patient is a medicaid pending LTR at [facility]. Discussed palliative care versus hospice care. Patient reports she is \"tired of all this.\" Clarified with patient her understanding of Hospice services and goals of care. Patient requests [facility] on discharge back to facility. [facility] referral sent. Education provided on [place] MOST- completed with patients daughter per patients request as she \"can't see.\" Call placed to admissions at [facility]- patient can return to facility with their \"palliative program\" until [facility] can establish admission for hospice care. Provided education on symptom management and medications used for comfort- patient and patients daughter v/u. Updated primary rn and provider. Palliative care will continue to follow up as needed.    Christine CONNER, RN, CHPN  Palliative Care    "

## 2024-11-25 NOTE — ED PROVIDER NOTES
Time: 7:12 AM EST  Date of encounter:  11/25/2024  Independent Historian/Clinical History and Information was obtained by:   Patient and Nursing Staff    History is limited by: N/A    Chief Complaint: Shortness of air from nursing home      History of Present Illness:  Patient is a 89 y.o. year old female who presents to the emergency department for evaluation of shortness of air.  Worsening in the past 12 hours.  Patient denies chest pain.  Reported fever from the nursing home yesterday.  No vomiting or diarrhea.  No worsening lower extremity edema.      Patient Care Team  Primary Care Provider: Garry Andrews MD    Past Medical History:     Allergies   Allergen Reactions    Pneumococcal Vac Polyvalent Swelling     Past Medical History:   Diagnosis Date    Allergic rhinitis due to allergen 11/05/2018    Anemia 11/14/2019    Anxiety disorder 10/26/2020    Arthritis     COPD (chronic obstructive pulmonary disease) 11/05/2018    DDD (degenerative disc disease), lumbar 11/14/2019    Deafness     Dyspnea 11/14/2019    Essential hypertension 11/05/2018    Fall 11/14/2019    SEAN (generalized anxiety disorder) 11/11/2020    Gait disturbance 11/05/2018    History of stroke 11/05/2018    Hypothyroidism 11/05/2018    Insomnia 05/02/2019    Joint pain 10/26/2020    Low hemoglobin 11/14/2019    Lumbago 08/21/2019    Lumbar herniated disc     L5-S1    Memory change 11/05/2018    Renal insufficiency 10/26/2020    Scoliosis     Sinus node dysfunction     Statin intolerance 11/14/2019    Tremor     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 No pertinent family history.    Home Medications:  Prior to Admission medications    Medication Sig Start Date End Date Taking? Authorizing Provider   amLODIPine (NORVASC) 5 MG tablet Take 1 tablet by mouth Daily. 11/9/24   Albert White MD    furosemide (Lasix) 20 MG tablet Take 1 tablet by mouth Daily. 10/29/24   Keisha Sullivan APRN   levothyroxine (SYNTHROID, LEVOTHROID) 50 MCG tablet Take 1 tablet by mouth Daily. 24   Keisha Sullivan APRN   losartan (COZAAR) 25 MG tablet Take 1 tablet by mouth 2 (Two) Times a Day. 24   Albert White MD   meclizine (ANTIVERT) 25 MG tablet Take 1 tablet by mouth 3 (Three) Times a Day As Needed for Dizziness. 24   Keisha Sullivan APRN   mirtazapine (REMERON) 7.5 MG tablet Take 1 tablet by mouth Every Night. 24   Keisha Sullivan APRN   vitamin B-12 (CYANOCOBALAMIN) 1000 MCG tablet Take 1 tablet by mouth Daily. 24   Keisha Sullivan APRN        Social History:   Social History     Tobacco Use    Smoking status: Former     Current packs/day: 0.00     Average packs/day: 0.3 packs/day for 30.0 years (7.5 ttl pk-yrs)     Types: Cigarettes     Start date:      Quit date:      Years since quittin.9    Smokeless tobacco: Never   Vaping Use    Vaping status: Never Used   Substance Use Topics    Alcohol use: Yes     Alcohol/week: 1.0 standard drink of alcohol     Types: 1 Glasses of wine per week     Comment: 1 drink per day     Drug use: Never         Review of Systems:  Review of Systems   Constitutional:  Positive for fever. Negative for chills.   HENT:  Negative for congestion, rhinorrhea and sore throat.    Eyes:  Negative for photophobia.   Respiratory:  Positive for cough and shortness of breath. Negative for apnea and chest tightness.    Cardiovascular:  Negative for chest pain and palpitations.   Gastrointestinal:  Negative for abdominal pain, diarrhea, nausea and vomiting.   Endocrine: Negative.    Genitourinary:  Negative for difficulty urinating and dysuria.   Musculoskeletal:  Negative for back pain, joint swelling and myalgias.   Skin:  Negative for color change and wound.   Allergic/Immunologic: Negative.    Neurological:  Negative for  "seizures and headaches.   Psychiatric/Behavioral: Negative.     All other systems reviewed and are negative.       Physical Exam:  /62 (BP Location: Right arm, Patient Position: Sitting)   Pulse 75   Temp 97.2 °F (36.2 °C) (Oral)   Resp 16   Ht 157.5 cm (62\")   Wt 66 kg (145 lb 8.1 oz)   SpO2 92%   BMI 26.61 kg/m²     Physical Exam  Vitals and nursing note reviewed.   Constitutional:       General: She is awake.      Appearance: Normal appearance.   HENT:      Head: Normocephalic and atraumatic.      Nose: Nose normal.      Mouth/Throat:      Mouth: Mucous membranes are moist.   Eyes:      Extraocular Movements: Extraocular movements intact.      Pupils: Pupils are equal, round, and reactive to light.   Cardiovascular:      Rate and Rhythm: Normal rate and regular rhythm.      Heart sounds: Normal heart sounds.   Pulmonary:      Effort: Pulmonary effort is normal. No respiratory distress.      Breath sounds: No wheezing, rhonchi or rales.      Comments: Mild bibasilar crackles, mildly diminished.  Abdominal:      General: Bowel sounds are normal.      Palpations: Abdomen is soft.      Tenderness: There is no abdominal tenderness. There is no guarding or rebound.      Comments: No rigidity   Musculoskeletal:         General: No tenderness. Normal range of motion.      Cervical back: Normal range of motion and neck supple.      Right lower leg: No edema.      Left lower leg: No edema.   Skin:     General: Skin is warm and dry.      Coloration: Skin is not jaundiced.   Neurological:      General: No focal deficit present.      Mental Status: Mental status is at baseline.   Psychiatric:         Mood and Affect: Mood normal.                  Procedures:  Procedures      Medical Decision Making:      Comorbidities that affect care:    COPD, deafness, generalized anxiety disorder, anemia, CVA , CHF    External Notes reviewed:    Hospital Discharge Summary:      Discharge Summary  Albert White MD " (Physician)  Hospitalist  Expand All Collapse All                                                                                    [facility]                                                                           HOSPITALIST  DISCHARGE SUMMARY     Patient Name: Loly Terrazas  : 6/10/1929  MRN: [id number]     Date of Admission: 2024  Date of Discharge: 2024  Primary Care Physician: Keisha Sullivan APRN     Consults         Date and Time Order Name Status Description     2024  7:48 AM Inpatient Nephrology Consult Completed       2024  7:08 AM Inpatient Cardiology Consult         2024  4:44 PM Inpatient Hospitalist Consult                    Active and Resolved Hospital Problems:       Active Hospital Problems     Diagnosis POA    **Heart failure [I50.9] Yes       Resolved Hospital Problems   No resolved problems to display.   UTI due to E. coli  Acute exacerbation of diastolic congestive heart failure  Advanced age with debility  Hypertension  Hypotension  NANETTE  Chronic hypoxemia  Hypothyroidism  Moderate TR  Paroxysmal SVT          The following orders were placed and all results were independently analyzed by me:  Orders Placed This Encounter   Procedures    COVID-19, FLU A/B, RSV PCR 1 HR TAT - Swab, Nasopharynx    XR Chest 1 View    [place] Draw    Comprehensive Metabolic Panel    BNP    Single High Sensitivity Troponin T    CBC Auto Differential    Urinalysis With Culture If Indicated - Urine, Clean Catch    Urinalysis, Microscopic Only - Urine, Clean Catch    Continuous Pulse Oximetry    Vital Signs    Inpatient Palliative Care Team Consult    Inpatient Hospice / [facility] Consult    ECG 12 Lead ED Triage Standing Order; SOA    CBC & Differential    Green Top (Gel)    Lavender Top    Gold Top - SST    Light Blue Top       Medications Given in the Emergency Department:  Medications   furosemide (LASIX) injection 40 mg (40 mg Intravenous Given 24 0813)   midodrine  "(PROAMATINE) tablet 5 mg (5 mg Oral Given 11/25/24 1126)        ED Course:    ED Course as of 11/25/24 1629   Mon Nov 25, 2024   0717 EMR evaluation: Patient somewhat recently discharged from our hospital in the last 3 weeks.  Chest x-ray also showed right pleural effusion with airspace disease at that time.   [RP]   0720 I have personally interpreted the EKG today and it shows no evidence of any acute ischemia or heart arrhythmia. [RP]   0734 Patient transiently in SVT on the monitor, however return to sinus rhythm prior to getting an EKG. [RP]   0915 Long discussion with daughter at bedside.  We did discuss readmission, however at the end of the day the patient's family asked what I would do for my family.  In this discussion she is comfortable sending the patient back to the nursing home with palliative care consult as was discussed at her most recent discharge.  The patient is DNR and has a very weak heart with multiple valvular issues in addition to diastolic CHF. [RP]   1204 Palliative care team has evaluated the patient here in the emergency department with family bedside.  Plan is to send meds with hospice referral as an outpatient.  I did sign a \"do not rehospitalize\" order that the family is in agreement with. [RP]      ED Course User Index  [RP] William Pierce MD       Labs:    Lab Results (last 24 hours)       Procedure Component Value Units Date/Time    CBC & Differential [[id number]]  (Abnormal) Collected: 11/25/24 0700    Specimen: Blood Updated: 11/25/24 0706    Narrative:      The following orders were created for panel order CBC & Differential.  Procedure                               Abnormality         Status                     ---------                               -----------         ------                     CBC Auto Differential[[id number]]        Abnormal            Final result                 Please view results for these tests on the individual orders.    Comprehensive Metabolic " Panel [[id number]]  (Abnormal) Collected: 11/25/24 0700    Specimen: Blood Updated: 11/25/24 0725     Glucose 133 mg/dL      BUN 17 mg/dL      Creatinine 1.44 mg/dL      Sodium 144 mmol/L      Potassium 3.4 mmol/L      Chloride 102 mmol/L      CO2 31.4 mmol/L      Calcium 9.0 mg/dL      Total Protein 6.5 g/dL      Albumin 3.5 g/dL      ALT (SGPT) 30 U/L      AST (SGOT) 38 U/L      Alkaline Phosphatase 86 U/L      Total Bilirubin 0.5 mg/dL      Globulin 3.0 gm/dL      A/G Ratio 1.2 g/dL      BUN/Creatinine Ratio 11.8     Anion Gap 10.6 mmol/L      eGFR 33.6 mL/min/1.73     Narrative:      GFR Normal >60  Chronic Kidney Disease <60  Kidney Failure <15    The GFR formula is only valid for adults with stable renal function between ages 18 and 70.    BNP [[id number]]  (Abnormal) Collected: 11/25/24 0700    Specimen: Blood Updated: 11/25/24 0723     proBNP 6,664.0 pg/mL     Narrative:      This assay is used as an aid in the diagnosis of individuals suspected of having heart failure. It can be used as an aid in the diagnosis of acute decompensated heart failure (ADHF) in patients presenting with signs and symptoms of ADHF to the emergency department (ED). In addition, NT-proBNP of <300 pg/mL indicates ADHF is not likely.    Age Range Result Interpretation  NT-proBNP Concentration (pg/mL:      <50             Positive            >450                   Gray                 300-450                    Negative             <300    50-75           Positive            >900                  Gray                300-900                  Negative            <300      >75             Positive            >1800                  Gray                300-1800                  Negative            <300    Single High Sensitivity Troponin T [[id number]]  (Abnormal) Collected: 11/25/24 0700    Specimen: Blood Updated: 11/25/24 0725     HS Troponin T 49 ng/L     Narrative:      High Sensitive Troponin T Reference Range:  <14.0 ng/L- Negative  Female for AMI  <22.0 ng/L- Negative Male for AMI  >=14 - Abnormal Female indicating possible myocardial injury.  >=22 - Abnormal Male indicating possible myocardial injury.   Clinicians would have to utilize clinical acumen, EKG, Troponin, and serial changes to determine if it is an Acute Myocardial Infarction or myocardial injury due to an underlying chronic condition.         CBC Auto Differential [[id number]]  (Abnormal) Collected: 11/25/24 0700    Specimen: Blood Updated: 11/25/24 0706     WBC 10.37 10*3/mm3      RBC 4.30 10*6/mm3      Hemoglobin 12.8 g/dL      Hematocrit 43.2 %      .5 fL      MCH 29.8 pg      MCHC 29.6 g/dL      RDW 14.0 %      RDW-SD 51.1 fl      MPV 10.9 fL      Platelets 162 10*3/mm3      Neutrophil % 66.2 %      Lymphocyte % 24.3 %      Monocyte % 8.0 %      Eosinophil % 1.0 %      Basophil % 0.3 %      Immature Grans % 0.2 %      Neutrophils, Absolute 6.87 10*3/mm3      Lymphocytes, Absolute 2.52 10*3/mm3      Monocytes, Absolute 0.83 10*3/mm3      Eosinophils, Absolute 0.10 10*3/mm3      Basophils, Absolute 0.03 10*3/mm3      Immature Grans, Absolute 0.02 10*3/mm3      nRBC 0.0 /100 WBC     COVID-19, FLU A/B, RSV PCR 1 HR TAT - Swab, Nasopharynx [[id number]]  (Normal) Collected: 11/25/24 0815    Specimen: Swab from Nasopharynx Updated: 11/25/24 0903     COVID19 Not Detected     Influenza A PCR Not Detected     Influenza B PCR Not Detected     RSV, PCR Not Detected    Narrative:      Fact sheet for providers: https://www.fda.gov/media/132999/download    Fact sheet for patients: https://www.fda.gov/media/928906/download    Test performed by PCR.    Urinalysis With Culture If Indicated - Urine, Clean Catch [[id number]]  (Abnormal) Collected: 11/25/24 1125    Specimen: Urine, Clean Catch Updated: 11/25/24 1203     Color, UA Dark Yellow     Appearance, UA Clear     pH, UA <=5.0     Specific Gravity, UA 1.015     Glucose, UA Negative     Ketones, UA Trace     Bilirubin, UA Negative      Blood, UA Negative     Protein, UA Trace     Leuk Esterase, UA Moderate (2+)     Nitrite, UA Negative     Urobilinogen, UA 1.0 E.U./dL    Narrative:      In absence of clinical symptoms, the presence of pyuria, bacteria, and/or nitrites on the urinalysis result does not correlate with infection.    Urinalysis, Microscopic Only - Urine, Clean Catch [[id number]]  (Abnormal) Collected: 11/25/24 1125    Specimen: Urine, Clean Catch Updated: 11/25/24 1230     RBC, UA None Seen /HPF      WBC, UA 3-5 /HPF      Comment: Urine culture not indicated.        Bacteria, UA Trace /HPF      Squamous Epithelial Cells, UA 3-6 /HPF      Hyaline Casts, UA 3-6 /LPF      Methodology Manual Light Microscopy             Imaging:    XR Chest 1 View    Result Date: 11/25/2024  XR CHEST 1 VW Date of Exam: 11/25/2024 7:03 AM EST Indication: SOA Triage Protocol Comparison: Chest x-ray 11/1/2024 Findings: The heart is prominent yet stable in size. There is right lower lobe airspace disease and underlying pleural effusion. There is mild left basilar atelectasis. No evidence for pneumothorax.     Impression: Right lower lobe airspace disease and underlying pleural effusion. Electronically Signed: Melissa Melendrez MD  11/25/2024 7:11 AM EST  Workstation ID: [id number]       Differential Diagnosis and Discussion:    Dyspnea: Differential diagnosis includes but is not limited to metabolic acidosis, neurological disorders, psychogenic, asthma, pneumothorax, upper airway obstruction, COPD, pneumonia, noncardiogenic pulmonary edema, interstitial lung disease, anemia, congestive heart failure, and pulmonary embolism    All labs were reviewed and interpreted by me.  All X-rays impressions were independently interpreted by me.  EKG was interpreted by me.    MDM           Total Critical Care time of 35 minutes. Total critical care time documented does not include time spent on separately billed procedures for services of nurses or physician assistants. I  personally saw and examined the patient. I have reviewed all diagnostic interpretations and treatment plans as written. I was present for the key portions of any procedures performed and the inclusive time noted in any critical care statement. Critical care time includes patient management by me, time spent at the patients bedside,  time to review lab and imaging results, discussing patient care, documentation in the medical record, and time spent with family or caregiver.          Patient Care Considerations:    SEPSIS was considered but is NOT present in the emergency department as SIRS criteria is not present.      Consultants/Shared Management Plan:    Consultant: I have discussed the case with palliative care who agrees to consult on the patient.    Social Determinants of Health:    Patient is a nursing home/assisted living resident and has reliable access to care.      Disposition and Care Coordination:    Discharged: I considered escalation of care by admitting this patient to the hospital, however family/POA prefers to avoid readmission.  Patient is DNR and family requests hospice/palliative care.    I have explained the patient´s condition, diagnoses and treatment plan based on the information available to me at this time. I have answered questions and addressed any concerns. The patient has a good  understanding of the patient´s diagnosis, condition, and treatment plan as can be expected at this point. The vital signs have been stable. The patient´s condition is stable and appropriate for discharge from the emergency department.      The patient will pursue further outpatient evaluation with the primary care physician or other designated or consulting physician as outlined in the discharge instructions. They are agreeable to this plan of care and follow-up instructions have been explained in detail. The patient has received these instructions in written format and has expressed an understanding of the  discharge instructions. The patient is aware that any significant change in condition or worsening of symptoms should prompt an immediate return to this or the closest emergency department or call to 911.    Final diagnoses:   Acute on chronic congestive heart failure, unspecified heart failure type        ED Disposition       ED Disposition   Discharge    Condition   Stable    Comment   --               This medical record created using voice recognition software.             William Pierce MD  11/25/24 0088

## 2024-11-25 NOTE — DISCHARGE INSTRUCTIONS
You should be hearing from the palliative care/hospice team that they are at the nursing home soon for further plans.

## 2024-11-26 ENCOUNTER — PATIENT OUTREACH (OUTPATIENT)
Dept: CASE MANAGEMENT | Facility: OTHER | Age: 89
End: 2024-11-26
Payer: MEDICARE

## 2024-11-26 NOTE — OUTREACH NOTE
AMBULATORY CASE MANAGEMENT NOTE    Names and Relationships of Patient/Support Persons: Contact: Loly Terrazas; Relationship: Self -     Patient Outreach    Outgoing call to the patient and spoke with her granddaugher, Milton Carrizales.  She states that he patient is  resident of [facility] and has a consult for [facility].  She denies any needs.     Paz OLMOS  Ambulatory Case Management    11/26/2024, 12:21 EST

## 2024-11-27 LAB
QT INTERVAL: 402 MS
QTC INTERVAL: 471 MS